# Patient Record
Sex: FEMALE | Race: BLACK OR AFRICAN AMERICAN | NOT HISPANIC OR LATINO | Employment: FULL TIME | URBAN - METROPOLITAN AREA
[De-identification: names, ages, dates, MRNs, and addresses within clinical notes are randomized per-mention and may not be internally consistent; named-entity substitution may affect disease eponyms.]

---

## 2017-08-20 ENCOUNTER — TELEPHONE (OUTPATIENT)
Dept: HEMATOLOGY/ONCOLOGY | Facility: CLINIC | Age: 47
End: 2017-08-20

## 2017-08-20 NOTE — TELEPHONE ENCOUNTER
----- Message from Viviana Caelro sent at 8/15/2017 10:04 AM CDT -----  New Patient Referral. Patient is being referred for anemia by TREY Myers. She has [employer]. Please Review and Schedule. Thanks

## 2017-09-08 ENCOUNTER — TELEPHONE (OUTPATIENT)
Dept: HEMATOLOGY/ONCOLOGY | Facility: CLINIC | Age: 47
End: 2017-09-08

## 2017-09-08 ENCOUNTER — OFFICE VISIT (OUTPATIENT)
Dept: HEMATOLOGY/ONCOLOGY | Facility: CLINIC | Age: 47
End: 2017-09-08
Payer: COMMERCIAL

## 2017-09-08 VITALS
HEIGHT: 64 IN | TEMPERATURE: 98 F | HEART RATE: 74 BPM | DIASTOLIC BLOOD PRESSURE: 82 MMHG | SYSTOLIC BLOOD PRESSURE: 124 MMHG | WEIGHT: 233 LBS | BODY MASS INDEX: 39.78 KG/M2

## 2017-09-08 DIAGNOSIS — I10 ESSENTIAL HYPERTENSION: ICD-10-CM

## 2017-09-08 DIAGNOSIS — I26.99 PULMONARY EMBOLISM, BILATERAL: ICD-10-CM

## 2017-09-08 DIAGNOSIS — K21.9 GASTROESOPHAGEAL REFLUX DISEASE, ESOPHAGITIS PRESENCE NOT SPECIFIED: ICD-10-CM

## 2017-09-08 DIAGNOSIS — E78.00 HIGH BLOOD CHOLESTEROL: ICD-10-CM

## 2017-09-08 DIAGNOSIS — D50.0 IRON DEFICIENCY ANEMIA DUE TO CHRONIC BLOOD LOSS: ICD-10-CM

## 2017-09-08 DIAGNOSIS — J41.0 SIMPLE CHRONIC BRONCHITIS: ICD-10-CM

## 2017-09-08 PROBLEM — J42 CHRONIC BRONCHITIS: Status: ACTIVE | Noted: 2017-09-08

## 2017-09-08 PROCEDURE — 99204 OFFICE O/P NEW MOD 45 MIN: CPT | Mod: ,,, | Performed by: INTERNAL MEDICINE

## 2017-09-08 PROCEDURE — 3008F BODY MASS INDEX DOCD: CPT | Mod: ,,, | Performed by: INTERNAL MEDICINE

## 2017-09-08 RX ORDER — PRAVASTATIN SODIUM 20 MG/1
20 TABLET ORAL DAILY
COMMUNITY
End: 2018-04-09 | Stop reason: SDUPTHER

## 2017-09-08 RX ORDER — NAPROXEN SODIUM 220 MG/1
81 TABLET, FILM COATED ORAL DAILY
COMMUNITY

## 2017-09-08 RX ORDER — OMEPRAZOLE 20 MG/1
20 CAPSULE, DELAYED RELEASE ORAL DAILY
COMMUNITY

## 2017-09-08 RX ORDER — LEVOCETIRIZINE DIHYDROCHLORIDE 5 MG/1
5 TABLET, FILM COATED ORAL NIGHTLY
COMMUNITY

## 2017-09-08 RX ORDER — VITAMIN E 268 MG
400 CAPSULE ORAL DAILY
COMMUNITY

## 2017-09-09 NOTE — PROGRESS NOTES
[facility] History & Physical    Subjective:      Patient ID:   Jalyn Dickson  47 y.o. female  1970  [place]      Chief Complaint:   Anemia and PE evaluation    HPI:  47 y.o. female with diagnosis of anemia x's several years.  On po Fe intermittently.  No hx B 12 pills or shots.  No jaundice, hepatitis, GB dx.  Son with S.S. Trait.    17 had SOB, L pleuritic chest pain.  Seen in ER.  Bilateral PE, treated with lovenox initially, followed by Eliquix 10mg BID followed by 5 mg BID now.  SOB better, but DECKER persists.  Chest pain resolved.  She gave hx of traveling to [place] 2017, prior to PE event.  Has not been on BCP x's 18 years, or HRT.    No DM, PUD, DVT, renal dx, cholesterol, thyroid dx or arthritis.    Hx BTL , thyroglossal cyst removed from neck .  Dr. Daniel Garner, ENT.  [id number]  Heavy menses, monthly, especially since starting Eliquis.    Smoke no.  ETOH rarely.  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    Dad DM, PVD, (vascular bypass),  CAD, S/P CABG, HTN cholesterol, recovered heroin addict.  72y/o.  Mom 60 y/o. AMI.  Son A & W.  Others HTN, DM, PVD, heart dx., SS dx and SLE.    ROS:   GEN: normal without any fever, night sweats or weight loss  HEENT: See HPI.   no HA's, sore throat, stiff neck, changes in vision  CV: normal with no CP, SOB, PND, DECKER or orthopnea  PULM: See HPI.  no SOB, cough, hemoptysis, sputum or pleuritic pain  GI: See HPI.   no abdominal pain, nausea, vomiting, constipation, diarrhea, melanotic stools, BRBPR, or hematemesis  : normal with no hematuria, dysuria  BREAST: normal with no mass, discharge, pain  SKIN: normal with no rash, erythema, bruising, or swelling     Past Medical History:   Diagnosis Date    Acid reflux     Allergy     Anxiety     Hypertension     Neck mass     Pulmonary embolism     Sinus drainage      Past Surgical History:   Procedure Laterality Date    NECK LESION BIOPSY      neck mass removal      TUBAL LIGATION      TUBAL LIGATION   "       Review of patient's allergies indicates:  No Known Allergies  Social History     Social History    Marital status:      Spouse name: N/A    Number of children: N/A    Years of education: N/A     Occupational History    Not on file.     Social History Main Topics    Smoking status: Never Smoker    Smokeless tobacco: Never Used    Alcohol use Yes      Comment: occasional    Drug use:     Sexual activity: Not on file     Other Topics Concern    Not on file     Social History Narrative    No narrative on file         Current Outpatient Prescriptions:     amlodipine-valsartan (EXFORGE)  mg per tablet, Take 1 tablet by mouth once daily., Disp: , Rfl:     apixaban (ELIQUIS) 5 mg Tab, Take 5 mg by mouth 2 (two) times daily., Disp: , Rfl:     aspirin 81 MG Chew, Take 81 mg by mouth once daily., Disp: , Rfl:     levocetirizine (XYZAL) 5 MG tablet, Take 5 mg by mouth every evening., Disp: , Rfl:     lorazepam (ATIVAN) 0.5 MG tablet, Take 0.5 mg by mouth every 6 (six) hours as needed., Disp: , Rfl:     omeprazole (PRILOSEC) 20 MG capsule, Take 20 mg by mouth once daily., Disp: , Rfl:     pravastatin (PRAVACHOL) 20 MG tablet, Take 20 mg by mouth once daily., Disp: , Rfl:     vitamin E 400 UNIT capsule, Take 400 Units by mouth once daily., Disp: , Rfl:           Objective:   Vitals:  Blood pressure 124/82, pulse 74, temperature 98.3 °F (36.8 °C), temperature source Oral, height 5' 4" (1.626 m), weight 105.7 kg (233 lb).    Physical Examination:   GEN: no apparent distress, comfortable  HEAD: atraumatic and normocephalic  EYES: no pallor, no icterus  ENT: no pharyngeal erythema, external ears WNL; no nasal discharge; no thrush  NECK: no masses, thyroid normal, trachea midline, no LAD/LN's, supple  CV: RRR with no murmur; normal pulse  CHEST: Normal respiratory effort; CTAB; normal breath sounds; no wheeze or crackles  ABDOM: nontender and nondistended; soft; normal bowel sounds; no " rebound/guarding, liver/spleen not palpable  MUSC/Skeletal: ROM normal; no crepitus; joints normal  EXTREM: no clubbing, cyanosis, inflammation or swelling  SKIN: no rashes, lesions, ulcers, petechiae  NEURO: grossly intact; motor/sensory WNL; no tremors  PSYCH: normal mood, affect and behavior  LYMPH: normal cervical, supraclavicular, axillary and groin LN's  BREASTS: She left bra on, I did not examine breasts.    Labs:  H/H 9.2/31.6, WBC 7,100.  plt cnt 484,000.  MCV 76, RDW 16.7.       Radiology/Diagnostic Studies:  Colonoscopy 1 year ago, Dr. Rush.      Assessment:   (1) 47 y.o. female with diagnosis of bilateral pulmonary emboli, hypercoagulation evaluation ordered at [facility].       Eliquis 5mg 1 BID. X's 6 months, recheck CAT at 6 months, 1/2018.    (2)Anemia 2nd heavy menses.  Discussed iron in making RBC, po iron with Vitamin C vs Ferrlicet infusion x's 8 weeks to       Replenish Fe stores.  Ferrlicet 1-2% reaction risk to infusion.  Oral Fe can give constipation, add chronulac syrup to help with       BM.    (3) Start FeS04 325mg 1 daily, Vitamin C 500mg 1 daily.  Chronulac 1 tbs daily or BID prn.       Check CBC, ferritin 6 weeks, RTC 6 weeks.          1. Pulmonary embolism, bilateral    2. Iron deficiency anemia due to chronic blood loss    3. Essential hypertension    4. Gastroesophageal reflux disease, esophagitis presence not specified    5. High blood cholesterol    6. Simple chronic bronchitis        Plan:       Pulmonary embolism, bilateral    Iron deficiency anemia due to chronic blood loss    Essential hypertension    Gastroesophageal reflux disease, esophagitis presence not specified    High blood cholesterol    Simple chronic bronchitis

## 2017-09-10 LAB
LA PPP-IMP: NORMAL
SCREEN APTT: NORMAL S
SCREEN DRVVT: NORMAL S

## 2017-09-12 LAB
APCR PPP: NORMAL {RATIO}
APTT PPP: 29 SEC (ref 22–34)
AT III ACT/NOR PPP CHRO: NORMAL %
B2 MICROGLOB SERPL-MCNC: 2 MG/L
CARDIOLIPIN IGA SER IA-ACNC: <11 APL
CARDIOLIPIN IGG SER IA-ACNC: <14 GPL
CARDIOLIPIN IGM SER IA-ACNC: <12 MPL
F2 C.20210G>A GENO BLD/T: NORMAL
F5 GENE MUT ANL BLD/T: NORMAL
FACT VIII ACT/NOR PPP: NORMAL %
FACT XII ACT/NOR PPP: NORMAL %
FERRITIN SERPL-MCNC: 3 NG/ML (ref 10–232)
FIBRINOGEN PPP-MCNC: 493 MG/DL (ref 175–425)
FOLATE SERPL-MCNC: >24 NG/ML
HCYS SERPL-SCNC: 9.9 UMOL/L
INR PPP: 1
LA PPP-IMP: NORMAL
MTHFR GENE MUT ANL BLD/T: NORMAL
PROT C ACT/NOR PPP: NORMAL %
PROT C AG ACT/NOR PPP IA: NORMAL %
PROT S ACT/NOR PPP: NORMAL %
PROT S AG ACT/NOR PPP IA: NORMAL %
PROTHROMBIN TIME: 11.1 SEC (ref 9–11.5)
SCREEN APTT: NORMAL S
SCREEN DRVVT: NORMAL S
SERVICE CMNT-IMP: NORMAL
VIT B12 SERPL-MCNC: 464 PG/ML (ref 200–1100)

## 2017-10-16 ENCOUNTER — OFFICE VISIT (OUTPATIENT)
Dept: HEMATOLOGY/ONCOLOGY | Facility: CLINIC | Age: 47
End: 2017-10-16
Payer: COMMERCIAL

## 2017-10-16 VITALS
TEMPERATURE: 98 F | WEIGHT: 234.63 LBS | HEART RATE: 81 BPM | SYSTOLIC BLOOD PRESSURE: 128 MMHG | DIASTOLIC BLOOD PRESSURE: 82 MMHG | RESPIRATION RATE: 18 BRPM | BODY MASS INDEX: 40.06 KG/M2 | HEIGHT: 64 IN

## 2017-10-16 DIAGNOSIS — D50.0 IRON DEFICIENCY ANEMIA DUE TO CHRONIC BLOOD LOSS: Primary | ICD-10-CM

## 2017-10-16 PROCEDURE — 99214 OFFICE O/P EST MOD 30 MIN: CPT | Mod: ,,, | Performed by: INTERNAL MEDICINE

## 2017-10-16 RX ORDER — AMLODIPINE, VALSARTAN AND HYDROCHLOROTHIAZIDE 10; 160; 12.5 MG/1; MG/1; MG/1
TABLET ORAL
COMMUNITY
Start: 2017-09-29 | End: 2018-04-09 | Stop reason: SDUPTHER

## 2017-10-16 RX ORDER — FERROUS SULFATE 325(65) MG
TABLET ORAL
COMMUNITY
Start: 2017-10-05 | End: 2018-09-26 | Stop reason: SDUPTHER

## 2017-10-16 RX ORDER — LACTULOSE 10 G/15ML
10 SOLUTION ORAL; RECTAL 2 TIMES DAILY
COMMUNITY
Start: 2017-09-11 | End: 2023-10-04 | Stop reason: SDUPTHER

## 2017-10-16 NOTE — PROGRESS NOTES
[facility] History & Physical    Subjective:      Patient ID:   Jalyn Dickson  47 y.o. female  1970  [facility]      Chief Complaint:   Anemia and PE evaluation    HPI:  47 y.o. female with diagnosis of anemia x's several years.  On po Fe intermittently.  No hx B 12 pills or shots.  No jaundice, hepatitis, GB dx.  Son with S.S. Trait.    17 had SOB, L pleuritic chest pain.  Seen in ER.  Bilateral PE, treated with lovenox initially, followed by Eliquix 10mg BID followed by 5 mg BID now.  SOB better, and DECKER is improved. She rates herself as going from a 2 to a 6 on a scale of 1-10.  Chest pain resolved.  She gave hx of traveling to [place] 2017, prior to PE event.  Has not been on BCP x's 18 years, or HRT.    No DM, PUD, DVT, renal dx, cholesterol, thyroid dx or arthritis.    Hx BTL , thyroglossal cyst removed from neck .  Dr. Daniel Garner, ENT.  [id number]  Heavy menses, monthly, especially since starting Eliquis.  Dr. Knutson, her gynecologist, is considering elective uterine ablation. She is status post bilateral tubal ligation    She is taking her iron pill daily, and is taking lactulose when necessary to help with constipation associated with her iron pill.    The pulmonary embolus was 2017. She will continue on her Eliquis  5 mg twice a day through 2017. At that time she and I will make a decision on continuing reduced dosage or stopping the Eliquis at that time.    Baseline hemoglobin is 9 ferritin is 3. She will continue on oral iron at this time and follow-up CBC and [facility]    Hyper coagulation studies are beginning to return, and are essentially normal thus far, await the results of pending labs.    Smoke no.  ETOH rarely.  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    Dad DM, PVD, (vascular bypass),  CAD, S/P CABG, HTN cholesterol, recovered heroin addict.  70y/o.  Mom 60 y/o. AMI.  Son A & W.  Others HTN, DM, PVD, heart dx., SS dx and SLE.    ROS:    GEN: normal without any fever, night sweats or weight loss  HEENT: See HPI.   no HA's, sore throat, stiff neck, changes in vision  CV: normal with no CP, SOB, PND, DECKER or orthopnea  PULM: See HPI.  no SOB, cough, hemoptysis, sputum or pleuritic pain  GI: See HPI.   no abdominal pain, nausea, vomiting, constipation, diarrhea, melanotic stools, BRBPR, or hematemesis  : normal with no hematuria, dysuria  BREAST: normal with no mass, discharge, pain  SKIN: normal with no rash, erythema, bruising, or swelling     Past Medical History:   Diagnosis Date    Acid reflux     Allergy     Anxiety     Hypertension     Neck mass     Pulmonary embolism     Sinus drainage      Past Surgical History:   Procedure Laterality Date    NECK LESION BIOPSY      neck mass removal      TUBAL LIGATION      TUBAL LIGATION         Review of patient's allergies indicates:  No Known Allergies  Social History     Social History    Marital status:      Spouse name: N/A    Number of children: N/A    Years of education: N/A     Occupational History    Not on file.     Social History Main Topics    Smoking status: Never Smoker    Smokeless tobacco: Never Used    Alcohol use Yes      Comment: occasional    Drug use:     Sexual activity: Not on file     Other Topics Concern    Not on file     Social History Narrative    No narrative on file         Current Outpatient Prescriptions:     amlodipine-valsartan (EXFORGE)  mg per tablet, Take 1 tablet by mouth once daily., Disp: , Rfl:     amlodipine-valsartan-hcthiazid -12.5 mg Tab, , Disp: , Rfl:     apixaban (ELIQUIS) 5 mg Tab, Take 5 mg by mouth 2 (two) times daily., Disp: , Rfl:     aspirin 81 MG Chew, Take 81 mg by mouth once daily., Disp: , Rfl:     ferrous sulfate 325 mg (65 mg iron) Tab tablet, , Disp: , Rfl:     GENERLAC 10 gram/15 mL solution, , Disp: , Rfl:     levocetirizine (XYZAL) 5 MG tablet, Take 5 mg by mouth every evening., Disp: , Rfl:  "    lorazepam (ATIVAN) 0.5 MG tablet, Take 0.5 mg by mouth every 6 (six) hours as needed., Disp: , Rfl:     omeprazole (PRILOSEC) 20 MG capsule, Take 20 mg by mouth once daily., Disp: , Rfl:     pravastatin (PRAVACHOL) 20 MG tablet, Take 20 mg by mouth once daily., Disp: , Rfl:     vitamin E 400 UNIT capsule, Take 400 Units by mouth once daily., Disp: , Rfl:           Objective:   Vitals:  Blood pressure 128/82, pulse 81, temperature 97.7 °F (36.5 °C), resp. rate 18, height 5' 4" (1.626 m), weight 106.4 kg (234 lb 9.6 oz).    Physical Examination:   GEN: no apparent distress, comfortable  HEAD: atraumatic and normocephalic  EYES: no pallor, no icterus  ENT: no pharyngeal erythema, external ears WNL; no nasal discharge; no thrush  NECK: no masses, thyroid normal, trachea midline, no LAD/LN's, supple  CV: RRR with no murmur; normal pulse  CHEST: Normal respiratory effort; CTAB; normal breath sounds; no wheeze or crackles  ABDOM: nontender and nondistended; soft; normal bowel sounds; no rebound/guarding, liver/spleen not palpable  MUSC/Skeletal: ROM normal; no crepitus; joints normal  EXTREM: no clubbing, cyanosis, inflammation or swelling  SKIN: no rashes, lesions, ulcers, petechiae  NEURO: grossly intact; motor/sensory WNL; no tremors  PSYCH: normal mood, affect and behavior  LYMPH: normal cervical, supraclavicular, axillary and groin LN's  BREASTS: She left bra on, I did not examine breasts.    Labs:  H/H 9.2/31.6, WBC 7,100.  plt cnt 484,000.  MCV 76, RDW 16.7.   Ferritin was 3.    Radiology/Diagnostic Studies:  Colonoscopy 1 year ago, Dr. Rsuh.      Assessment:   (1) 47 y.o. female with diagnosis of bilateral pulmonary emboli, hypercoagulation evaluation ordered at [facility].       Eliquis 5mg 1 BID. X's 6 months, recheck CAT at 6 months, 1/2018.  Results of hypercoagulation evaluation are pending.    (2)Anemia 2nd heavy menses.  Discussed iron in making RBC, po iron with Vitamin C vs Ferrlicet infusion x's 8 " weeks to       Replenish Fe stores.  Ferrlicet 1-2% reaction risk to infusion.  Oral Fe can give constipation, add chronulac syrup to help with       BM.    (3) Start FeS04 325mg 1 daily, Vitamin C 500mg 1 daily.  Chronulac 1 tbs daily or BID prn.       Check CBC, ferritin 2  Months, return to clinic 2 months.

## 2017-10-16 NOTE — LETTER
October 16, 2017      Rachel Wagner MD  [address]  [address]  [place] [address]           [facility] Hematology Oncology  [address]  [address]  [place] [address]  Phone: [phone]  Fax: [phone]          Patient: Jalyn Dickson   MR Number: [id number]   YOB: 1970   Date of Visit: 10/16/2017       Dear Dr. Rachel Wagner:    Thank you for referring Jalyn Dickson to me for evaluation. Attached you will find relevant portions of my assessment and plan of care.    If you have questions, please do not hesitate to call me. I look forward to following Jalyn Dickson along with you.    Sincerely,    DELORIS Sims MD    Enclosure  CC:  No Recipients    If you would like to receive this communication electronically, please contact [email or web address] or [phone] to request more information on [employer] Link access.    For providers and/or their staff who would like to refer a patient to [facility], please contact us through our one-stop-shop provider referral line, [facility], at [phone].    If you feel you have received this communication in error or would no longer like to receive these types of communications, please e-mail [email or web address]

## 2017-12-18 ENCOUNTER — OFFICE VISIT (OUTPATIENT)
Dept: HEMATOLOGY/ONCOLOGY | Facility: CLINIC | Age: 47
End: 2017-12-18
Payer: COMMERCIAL

## 2017-12-18 VITALS
DIASTOLIC BLOOD PRESSURE: 73 MMHG | SYSTOLIC BLOOD PRESSURE: 116 MMHG | BODY MASS INDEX: 39.9 KG/M2 | HEIGHT: 64 IN | RESPIRATION RATE: 18 BRPM | WEIGHT: 233.69 LBS | HEART RATE: 82 BPM | TEMPERATURE: 98 F

## 2017-12-18 DIAGNOSIS — I26.99 PULMONARY EMBOLISM, BILATERAL: Primary | ICD-10-CM

## 2017-12-18 DIAGNOSIS — D50.0 IRON DEFICIENCY ANEMIA DUE TO CHRONIC BLOOD LOSS: ICD-10-CM

## 2017-12-18 LAB
BASOPHILS # BLD AUTO: 40 CELLS/UL (ref 0–200)
BASOPHILS NFR BLD AUTO: 0.6 %
EOSINOPHIL # BLD AUTO: 221 CELLS/UL (ref 15–500)
EOSINOPHIL NFR BLD AUTO: 3.3 %
ERYTHROCYTE [DISTWIDTH] IN BLOOD BY AUTOMATED COUNT: 16 % (ref 11–15)
FERRITIN SERPL-MCNC: 13 NG/ML (ref 10–232)
HCT VFR BLD AUTO: 39.2 % (ref 35–45)
HGB BLD-MCNC: 12.3 G/DL (ref 11.7–15.5)
LYMPHOCYTES # BLD AUTO: 2647 CELLS/UL (ref 850–3900)
LYMPHOCYTES NFR BLD AUTO: 39.5 %
MCH RBC QN AUTO: 25.8 PG (ref 27–33)
MCHC RBC AUTO-ENTMCNC: 31.4 G/DL (ref 32–36)
MCV RBC AUTO: 82.2 FL (ref 80–100)
MONOCYTES # BLD AUTO: 690 CELLS/UL (ref 200–950)
MONOCYTES NFR BLD AUTO: 10.3 %
NEUTROPHILS # BLD AUTO: 3102 CELLS/UL (ref 1500–7800)
NEUTROPHILS NFR BLD AUTO: 46.3 %
PLATELET # BLD AUTO: 464 THOUSAND/UL (ref 140–400)
PMV BLD REES-ECKER: 10.8 FL (ref 7.5–12.5)
RBC # BLD AUTO: 4.77 MILLION/UL (ref 3.8–5.1)
WBC # BLD AUTO: 6.7 THOUSAND/UL (ref 3.8–10.8)

## 2017-12-18 PROCEDURE — 99214 OFFICE O/P EST MOD 30 MIN: CPT | Mod: ,,, | Performed by: INTERNAL MEDICINE

## 2017-12-18 NOTE — PROGRESS NOTES
[facility] History & Physical    Subjective:      Patient ID:   Jalyn Dickson  47 y.o. female  1970  [facility]      Chief Complaint:   Anemia and PE evaluation    HPI:  47 y.o. female with diagnosis of anemia x's several years.  On po Fe intermittently.  No hx B 12 pills or shots.  No jaundice, hepatitis, GB dx.  Son with S.S. Trait.    17 had SOB, L pleuritic chest pain.  Seen in ER.  Bilateral PE, treated with lovenox initially, followed by Eliquix 10mg BID followed by 5 mg BID now.  SOB better, and DECKER is improved. She rates herself as going from a 2 to a 6 on a scale of 1-10.  Chest pain resolved.  She gave hx of traveling to [place] 2017, prior to PE event.  Has not been on BCP x's 18 years, or HRT.    No DM, PUD, DVT, renal dx, cholesterol, thyroid dx or arthritis.    Hx BTL , thyroglossal cyst removed from neck .  Dr. Daniel Garner, ENT.  [id number]  Heavy menses, monthly, especially since starting Eliquis.  Dr. Knutson, her gynecologist, is considering elective uterine ablation. She is status post bilateral tubal ligation    She is taking her iron pill daily, and is taking lactulose when necessary to help with constipation associated with her iron pill.    The pulmonary embolus was 2017. She will continue on her Eliquis  5 mg twice a day through 2017. At that time she and I will make a decision on continuing reduced dosage or stopping the Eliquis at that time.    Baseline hemoglobin is 9 ferritin is 3. She will continue on oral iron at this time and follow-up CBC and [facility].    After oral irom, hemoglobin is improved to 12.3 and ferritin is improved to 13. She will continue on oral iron at this time and repeat blood work will be checked in 4 months.    Hyper coagulation studies are beginning to return, and are essentially normal thus far, await the results of pending labs.    Smoke no.  ETOH rarely.  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    Dad DM,  PVD, (vascular bypass),  CAD, S/P CABG, HTN cholesterol, recovered heroin addict.  72y/o.  Mom 58 y/o. AMI.  Son A & W.  Others HTN, DM, PVD, heart dx., SS dx and SLE.    ROS:   GEN: normal without any fever, night sweats or weight loss  HEENT: See HPI.   no HA's, sore throat, stiff neck, changes in vision  CV: normal with no CP, SOB, PND, DECKER or orthopnea  PULM: See HPI.  no SOB, cough, hemoptysis, sputum or pleuritic pain  GI: See HPI.   no abdominal pain, nausea, vomiting, constipation, diarrhea, melanotic stools, BRBPR, or hematemesis  : normal with no hematuria, dysuria  BREAST: normal with no mass, discharge, pain  SKIN: normal with no rash, erythema, bruising, or swelling     Past Medical History:   Diagnosis Date    Acid reflux     Allergy     Anxiety     Hypertension     Neck mass     Pulmonary embolism     Sinus drainage      Past Surgical History:   Procedure Laterality Date    NECK LESION BIOPSY      neck mass removal      TUBAL LIGATION      TUBAL LIGATION         Review of patient's allergies indicates:  No Known Allergies  Social History     Social History    Marital status:      Spouse name: N/A    Number of children: N/A    Years of education: N/A     Occupational History    Not on file.     Social History Main Topics    Smoking status: Never Smoker    Smokeless tobacco: Never Used    Alcohol use Yes      Comment: occasional    Drug use: Yes    Sexual activity: Not on file     Other Topics Concern    Not on file     Social History Narrative    No narrative on file         Current Outpatient Prescriptions:     amlodipine-valsartan (EXFORGE)  mg per tablet, Take 1 tablet by mouth once daily., Disp: , Rfl:     amlodipine-valsartan-hcthiazid -12.5 mg Tab, , Disp: , Rfl:     apixaban (ELIQUIS) 5 mg Tab, Take 5 mg by mouth 2 (two) times daily., Disp: , Rfl:     aspirin 81 MG Chew, Take 81 mg by mouth once daily., Disp: , Rfl:     ferrous sulfate 325 mg (65  "mg iron) Tab tablet, , Disp: , Rfl:     GENERLAC 10 gram/15 mL solution, , Disp: , Rfl:     levocetirizine (XYZAL) 5 MG tablet, Take 5 mg by mouth every evening., Disp: , Rfl:     lorazepam (ATIVAN) 0.5 MG tablet, Take 0.5 mg by mouth every 6 (six) hours as needed., Disp: , Rfl:     omeprazole (PRILOSEC) 20 MG capsule, Take 20 mg by mouth once daily., Disp: , Rfl:     pravastatin (PRAVACHOL) 20 MG tablet, Take 20 mg by mouth once daily., Disp: , Rfl:     vitamin E 400 UNIT capsule, Take 400 Units by mouth once daily., Disp: , Rfl:           Objective:   Vitals:  Blood pressure 116/73, pulse 82, temperature 98.1 °F (36.7 °C), resp. rate 18, height 5' 4" (1.626 m), weight 106 kg (233 lb 11.2 oz).    Physical Examination:   GEN: no apparent distress, comfortable  HEAD: atraumatic and normocephalic  EYES: no pallor, no icterus  ENT: no pharyngeal erythema, external ears WNL; no nasal discharge; no thrush  NECK: no masses, thyroid normal, trachea midline, no LAD/LN's, supple  CV: RRR with no murmur; normal pulse  CHEST: Normal respiratory effort; CTAB; normal breath sounds; no wheeze or crackles  ABDOM: nontender and nondistended; soft; normal bowel sounds; no rebound/guarding, liver/spleen not palpable  MUSC/Skeletal: ROM normal; no crepitus; joints normal  EXTREM: no clubbing, cyanosis, inflammation or swelling  SKIN: no rashes, lesions, ulcers, petechiae  NEURO: grossly intact; motor/sensory WNL; no tremors  PSYCH: normal mood, affect and behavior  LYMPH: normal cervical, supraclavicular, axillary and groin LN's  BREASTS: She left bra on, I did not examine breasts.    Labs:  H/H 9.2/31.6.   RDW 16.7.   Ferritin was 3.  On repeat hemoglobin is 12.3 and ferritin is improved at 13    Radiology/Diagnostic Studies:  Colonoscopy 1 year ago, Dr. Rush.      Assessment:   (1) 47 y.o. female with diagnosis of bilateral pulmonary emboli, hypercoagulation evaluation ordered at [facility].       Eliquis 5mg 1 BID. X's 6 " months, recheck CAT at 6 months, 1/2018.  Results of hypercoagulation evaluation are pending.    (2)Anemia 2nd heavy menses.  Discussed iron in making RBC, po iron with Vitamin C vs Ferrlicet infusion x's 8 weeks to       Replenish Fe stores.  Ferrlicet 1-2% reaction risk to infusion.  Oral Fe can give constipation, add chronulac syrup to help with       BM.    (3) Continue FeS04 325mg 1 daily, Vitamin C 500mg 1 daily.  Chronulac 1 tbs daily or BID prn.       Check CBC, ferritin 4  Months, return to clinic 4 months.    Check CAT scan status in 1 week and return to clinic in 2 weeks, can cancel.  Make a decision on continuing Elliquis or  stopping Elliquis after hypercoagulation labs studies have returned.

## 2017-12-18 NOTE — LETTER
December 18, 2017      Rachel Wagner MD  [address]  [address]  [place] [address]           [facility]  [address]  [address]  [place] [address]  Phone: [phone]  Fax: [phone]          Patient: Jalyn Dickson   MR Number: [id number]   YOB: 1970   Date of Visit: 12/18/2017       Dear Dr. Rachel Wagner:    Thank you for referring Jalyn Dickson to me for evaluation. Attached you will find relevant portions of my assessment and plan of care.    If you have questions, please do not hesitate to call me. I look forward to following Jalyn Dickson along with you.    Sincerely,    DELORIS Sims MD    Enclosure  CC:  No Recipients    If you would like to receive this communication electronically, please contact [email or web address] or [phone] to request more information on [employer] Link access.    For providers and/or their staff who would like to refer a patient to [facility], please contact us through our one-stop-shop provider referral line, [facility], at [phone].    If you feel you have received this communication in error or would no longer like to receive these types of communications, please e-mail [email or web address]

## 2017-12-20 ENCOUNTER — TELEPHONE (OUTPATIENT)
Dept: HEMATOLOGY/ONCOLOGY | Facility: CLINIC | Age: 47
End: 2017-12-20

## 2017-12-20 DIAGNOSIS — I26.99 PULMONARY EMBOLISM, BILATERAL: Primary | ICD-10-CM

## 2017-12-20 NOTE — TELEPHONE ENCOUNTER
Called the pt and LM letting her know that we are working on her orders to be signed and authorized.

## 2017-12-28 ENCOUNTER — TELEPHONE (OUTPATIENT)
Dept: HEMATOLOGY/ONCOLOGY | Facility: CLINIC | Age: 47
End: 2017-12-28

## 2018-01-03 NOTE — TELEPHONE ENCOUNTER
Notified pt that CT scan showed that PE had resolved and she needed to stay on eliquis BID and keep appt. To see him in 4 months. EVIE

## 2018-03-30 LAB
BASOPHILS ABSOLUTE COUNT: 0.1 K/UL (ref 0–0.2)
BASOPHILS NFR BLD: 1.1 % (ref 0–2)
DIFFERENTIAL TYPE: ABNORMAL
EOSINOPHIL NFR BLD: 2.7 % (ref 0–4)
EOSINOPHILS ABSOLUTE COUNT: 0.2 K/UL (ref 0–0.7)
ERYTHROCYTE [DISTWIDTH] IN BLOOD BY AUTOMATED COUNT: 15.3 GRAM/DL (ref 12–15.3)
FERRITIN SERPL-MCNC: 24.2 NANOGRAM/ML (ref 13–150)
HCT VFR BLD AUTO: 38.7 % (ref 37–47)
HGB BLD-MCNC: 12.8 GRAM/DL (ref 12–16)
LYMPHOCYTES %: 34.9 % (ref 15–45)
LYMPHOCYTES ABSOLUTE COUNT: 2 K/UL (ref 1–4.2)
MCH RBC QN AUTO: 29.9 PICOGRAM (ref 27–33)
MCHC RBC AUTO-ENTMCNC: 33.2 GRAM/DL (ref 32–36)
MCV RBC AUTO: 90 FEMTOLITER (ref 81–99)
MONOCYTES %: 10.6 % (ref 3–13)
MONOCYTES ABSOLUTE COUNT: 0.6 K/UL (ref 0.1–0.8)
NEUTROPHILS ABSOLUTE COUNT: 2.8 K/UL (ref 2.1–7.6)
NEUTROPHILS RELATIVE PERCENT: 50.7 % (ref 32–80)
PLATELET # BLD AUTO: 425 K/UL (ref 150–350)
PMV BLD AUTO: 8.9 FEMTOLITER (ref 7–10.2)
RBC # BLD AUTO: 4.3 MIL/UL (ref 4.2–5.4)
WBC # BLD AUTO: 5.6 K/UL (ref 4.5–11)

## 2018-04-02 ENCOUNTER — TELEPHONE (OUTPATIENT)
Dept: INTERNAL MEDICINE | Facility: CLINIC | Age: 48
End: 2018-04-02

## 2018-04-02 DIAGNOSIS — E78.00 PURE HYPERCHOLESTEROLEMIA: Primary | ICD-10-CM

## 2018-04-02 NOTE — TELEPHONE ENCOUNTER
----- Message from Ariana Goncalves sent at 4/2/2018  8:53 AM CDT -----  Contact: Jalyn Fleming is requesting lab orders to check her cholesterol prior to her appt on 4/9

## 2018-04-09 ENCOUNTER — OFFICE VISIT (OUTPATIENT)
Dept: INTERNAL MEDICINE | Facility: CLINIC | Age: 48
End: 2018-04-09
Payer: COMMERCIAL

## 2018-04-09 VITALS
HEIGHT: 64 IN | HEART RATE: 74 BPM | DIASTOLIC BLOOD PRESSURE: 82 MMHG | SYSTOLIC BLOOD PRESSURE: 122 MMHG | WEIGHT: 220 LBS | OXYGEN SATURATION: 98 % | RESPIRATION RATE: 18 BRPM | BODY MASS INDEX: 37.56 KG/M2 | TEMPERATURE: 98 F

## 2018-04-09 DIAGNOSIS — E78.00 HIGH BLOOD CHOLESTEROL: ICD-10-CM

## 2018-04-09 DIAGNOSIS — K21.9 GASTROESOPHAGEAL REFLUX DISEASE WITHOUT ESOPHAGITIS: ICD-10-CM

## 2018-04-09 DIAGNOSIS — I10 ESSENTIAL HYPERTENSION: Primary | ICD-10-CM

## 2018-04-09 DIAGNOSIS — D50.0 IRON DEFICIENCY ANEMIA DUE TO CHRONIC BLOOD LOSS: ICD-10-CM

## 2018-04-09 PROBLEM — F41.9 ANXIETY: Status: ACTIVE | Noted: 2018-04-09

## 2018-04-09 PROCEDURE — 3074F SYST BP LT 130 MM HG: CPT | Mod: ,,, | Performed by: NURSE PRACTITIONER

## 2018-04-09 PROCEDURE — 3079F DIAST BP 80-89 MM HG: CPT | Mod: ,,, | Performed by: NURSE PRACTITIONER

## 2018-04-09 PROCEDURE — 99213 OFFICE O/P EST LOW 20 MIN: CPT | Mod: ,,, | Performed by: NURSE PRACTITIONER

## 2018-04-09 RX ORDER — LORAZEPAM 0.5 MG/1
0.5 TABLET ORAL EVERY 6 HOURS PRN
Status: CANCELLED | OUTPATIENT
Start: 2018-04-09

## 2018-04-09 RX ORDER — IBUPROFEN 100 MG/5ML
1000 SUSPENSION, ORAL (FINAL DOSE FORM) ORAL DAILY
COMMUNITY

## 2018-04-09 RX ORDER — MULTIVIT WITH MINERALS/HERBS
1 TABLET ORAL DAILY
COMMUNITY

## 2018-04-09 RX ORDER — LANOLIN ALCOHOL/MO/W.PET/CERES
100 CREAM (GRAM) TOPICAL DAILY
COMMUNITY

## 2018-04-09 RX ORDER — AMLODIPINE, VALSARTAN AND HYDROCHLOROTHIAZIDE 10; 160; 12.5 MG/1; MG/1; MG/1
1 TABLET ORAL DAILY
Qty: 90 TABLET | Refills: 1 | Status: SHIPPED | OUTPATIENT
Start: 2018-04-09 | End: 2018-10-10 | Stop reason: SDUPTHER

## 2018-04-09 RX ORDER — PRAVASTATIN SODIUM 20 MG/1
20 TABLET ORAL DAILY
Qty: 90 TABLET | Refills: 1 | Status: SHIPPED | OUTPATIENT
Start: 2018-04-09 | End: 2018-10-10

## 2018-04-09 NOTE — PATIENT INSTRUCTIONS
Understanding Anxiety Disorders  Almost everyone gets nervous now and then. Its normal to have knots in your stomach before a test, or for your heart to race on a first date. But an anxiety disorder is much more than a case of nerves. In fact, its symptoms may be overwhelming. But treatment can relieve many of these symptoms. Talking to your healthcare provider is the first step.    What are anxiety disorders?  An anxiety disorder causes intense feelings of panic and fear. These feelings may arise for no apparent reason. And they tend to recur again and again. They may prevent you from coping with life and cause you great distress. As a result, you may avoid anything that triggers your fear. In extreme cases, you may never leave the house. Anxiety disorders may cause other symptoms, such as:  · Obsessive thoughts you cant control  · Constant nightmares or painful thoughts of the past  · Nausea, sweating, and muscle tension  · Trouble sleeping or concentrating  What causes anxiety disorders?  Anxiety disorders tend to run in families. For some people, childhood abuse or neglect may play a role. For others, stressful life events or trauma may trigger anxiety disorders. Anxiety can trigger low self-esteem and poor coping skills.  Common anxiety disorders  · Panic disorder. This causes an intense fear of being in danger.  · Phobias. These are extreme fears of certain objects, places, or events.  · Obsessive-compulsive disorder. This causes you to have unwanted thoughts and urges. You also may perform certain actions over and over.  · Posttraumatic stress disorder. This occurs in people who have survived a terrible ordeal. It can cause nightmares and flashbacks about the event.  · Generalized anxiety disorder. This causes constant worry that can greatly disrupt your life.   Getting better  You may believe that nothing can help you. Or, you might fear what others may think. But most anxiety symptoms can be eased.  Having an anxiety disorder is nothing to be ashamed of. Most people do best with treatment that combines medicine and therapy. These arent cures. But they can help you live a healthier life.  Date Last Reviewed: 2/1/2017  © 9743-4554 "[employer]. [address] [place] [address], [place] [address]. All rights reserved. This information is not intended as a substitute for professional medical care. Always follow your healthcare professional's instructions.

## 2018-04-09 NOTE — PROGRESS NOTES
SUBJECTIVE:      Patient ID: Jalyn Dickson is a 48 y.o. female.    Chief Complaint: Follow-up (6 month)    In for routine follow-up &  Med refills, no complaints at this time    Follows with Dr. Sims for anemia    Wants to get off or lower dose of pravastatin if cholesterol numbers are good - need lipid panel to assess    Requesting refill on ativan -  shows latest script was filled 3/27/18, discussed inability to refill at this time        Past Surgical History:   Procedure Laterality Date    NECK LESION BIOPSY      neck mass removal      TUBAL LIGATION      TUBAL LIGATION       Family History   Problem Relation Age of Onset    Heart disease Mother     Coronary artery disease Father     Peripheral vascular disease Father     Diabetes Father     Hypertension Father       Social History     Social History    Marital status:      Spouse name: N/A    Number of children: N/A    Years of education: N/A     Social History Main Topics    Smoking status: Never Smoker    Smokeless tobacco: Never Used    Alcohol use Yes      Comment: occasional    Drug use: Yes    Sexual activity: Not Asked     Other Topics Concern    None     Social History Narrative    None     Current Outpatient Prescriptions   Medication Sig Dispense Refill    amLODIPine-valsartan-hcthiazid -12.5 mg Tab Take 1 tablet by mouth once daily. 90 tablet 1    apixaban (ELIQUIS) 5 mg Tab Take 5 mg by mouth 2 (two) times daily.      ascorbic acid, vitamin C, (VITAMIN C) 1000 MG tablet Take 1,000 mg by mouth once daily.      aspirin 81 MG Chew Take 81 mg by mouth once daily.      b complex vitamins tablet Take 1 tablet by mouth once daily.      cyanocobalamin (VITAMIN B-12) 1000 MCG tablet Take 100 mcg by mouth once daily.      ferrous sulfate 325 mg (65 mg iron) Tab tablet       GENERLAC 10 gram/15 mL solution       levocetirizine (XYZAL) 5 MG tablet Take 5 mg by mouth every evening.      lorazepam (ATIVAN)  0.5 MG tablet Take 0.5 mg by mouth every 6 (six) hours as needed.      omeprazole (PRILOSEC) 20 MG capsule Take 20 mg by mouth once daily.      pravastatin (PRAVACHOL) 20 MG tablet Take 1 tablet (20 mg total) by mouth once daily. 90 tablet 1    vitamin E 400 UNIT capsule Take 400 Units by mouth once daily.       No current facility-administered medications for this visit.      Review of patient's allergies indicates:  No Known Allergies   Past Medical History:   Diagnosis Date    Acid reflux     Allergy     Anxiety     Hypertension     Neck mass     Pulmonary embolism     Sinus drainage      Past Surgical History:   Procedure Laterality Date    NECK LESION BIOPSY      neck mass removal      TUBAL LIGATION      TUBAL LIGATION         Review of Systems   Constitutional: Negative for activity change, appetite change, fatigue and unexpected weight change.   HENT: Negative for congestion, ear pain, hearing loss, postnasal drip, sinus pain, sinus pressure, sneezing and sore throat.    Eyes: Negative for photophobia and pain.   Respiratory: Negative for cough, chest tightness, shortness of breath and wheezing.    Cardiovascular: Negative for chest pain, palpitations and leg swelling.   Gastrointestinal: Negative for abdominal distention, abdominal pain, blood in stool, constipation, diarrhea, nausea and vomiting.   Endocrine: Negative for cold intolerance, heat intolerance, polydipsia and polyuria.   Genitourinary: Negative for difficulty urinating, dysuria, flank pain, frequency, hematuria, pelvic pain and urgency.   Musculoskeletal: Negative for arthralgias, back pain, joint swelling, myalgias and neck pain.   Skin: Negative for pallor.   Allergic/Immunologic: Negative for environmental allergies and food allergies.   Neurological: Negative for dizziness, weakness, light-headedness and numbness.   Hematological: Bruises/bleeds easily (eliquis).   Psychiatric/Behavioral: Negative for agitation, confusion,  "decreased concentration and sleep disturbance. The patient is not nervous/anxious.       OBJECTIVE:      Vitals:    04/09/18 1623   BP: 122/82   Pulse: 74   Resp: 18   Temp: 97.7 °F (36.5 °C)   SpO2: 98%   Weight: 99.8 kg (220 lb)   Height: 5' 4" (1.626 m)     Physical Exam   Constitutional: She is oriented to person, place, and time. Vital signs are normal. She appears well-developed and well-nourished. She is cooperative. No distress.   HENT:   Head: Normocephalic and atraumatic.   Nose: Nose normal. No rhinorrhea.   Mouth/Throat: Mucous membranes are normal.   Eyes: Conjunctivae and lids are normal. Right pupil is round and reactive. Left pupil is round and reactive.   Neck: Trachea normal.   Cardiovascular: Normal rate, regular rhythm, normal heart sounds and intact distal pulses.  Exam reveals no gallop and no friction rub.    No murmur heard.  Pulses:       Radial pulses are 2+ on the right side, and 2+ on the left side.   Pulmonary/Chest: Effort normal and breath sounds normal. No respiratory distress. She has no decreased breath sounds. She has no wheezes. She has no rhonchi. She has no rales.   Abdominal: Soft. Bowel sounds are normal. She exhibits no distension. There is no tenderness. There is no rigidity and no guarding.   obese   Musculoskeletal: Normal range of motion. She exhibits no edema.   Neurological: She is alert and oriented to person, place, and time. GCS eye subscore is 4. GCS verbal subscore is 5. GCS motor subscore is 6.   Skin: Skin is warm and dry. Capillary refill takes less than 2 seconds. No rash noted.   Psychiatric: She has a normal mood and affect. Her speech is normal and behavior is normal. Judgment and thought content normal. Cognition and memory are normal.   Vitals reviewed.     Assessment:       1. Essential hypertension    2. High blood cholesterol    3. Iron deficiency anemia due to chronic blood loss    4. Gastroesophageal reflux disease without esophagitis        Plan:     "   Essential hypertension  -     amLODIPine-valsartan-hcthiazid -12.5 mg Tab; Take 1 tablet by mouth once daily.  Dispense: 90 tablet; Refill: 1  -     Comprehensive metabolic panel; Future; Expected date: 04/09/2018    High blood cholesterol  -     pravastatin (PRAVACHOL) 20 MG tablet; Take 1 tablet (20 mg total) by mouth once daily.  Dispense: 90 tablet; Refill: 1  -     Lipid panel; Future; Expected date: 04/09/2018    Iron deficiency anemia due to chronic blood loss   -follows with Dr. Sims    Gastroesophageal reflux disease without esophagitis   -stable on meds    Will FU re: labs    Follow-up in about 6 months (around 10/9/2018) for routine.      4/9/2018 ALIRIO Randall, FNP-C

## 2018-04-24 ENCOUNTER — OFFICE VISIT (OUTPATIENT)
Dept: HEMATOLOGY/ONCOLOGY | Facility: CLINIC | Age: 48
End: 2018-04-24
Payer: COMMERCIAL

## 2018-04-24 VITALS
DIASTOLIC BLOOD PRESSURE: 87 MMHG | BODY MASS INDEX: 38.26 KG/M2 | TEMPERATURE: 98 F | HEART RATE: 67 BPM | HEIGHT: 64 IN | SYSTOLIC BLOOD PRESSURE: 121 MMHG | WEIGHT: 224.13 LBS

## 2018-04-24 DIAGNOSIS — D50.0 IRON DEFICIENCY ANEMIA DUE TO CHRONIC BLOOD LOSS: Primary | ICD-10-CM

## 2018-04-24 PROCEDURE — 99214 OFFICE O/P EST MOD 30 MIN: CPT | Mod: ,,, | Performed by: INTERNAL MEDICINE

## 2018-04-24 NOTE — PROGRESS NOTES
[facility] History & Physical    Subjective:      Patient ID:   Jalyn Dickson  48 y.o. female  1970  [facility]      Chief Complaint:   Anemia and PE evaluation    HPI:  48 y.o. female with diagnosis of anemia x's several years.  On po Fe intermittently.  No hx B 12 pills or shots.  No jaundice, hepatitis, GB dx.  Son with S.S. Trait.    17 had SOB, L pleuritic chest pain.  Seen in ER.  Bilateral PE, treated with lovenox initially, followed by Eliquix 10mg BID followed by 5 mg BID now.  SOB better, and DECKER is improved. She rates herself as going from a 2 to a 6 on a scale of 1-10.  Chest pain resolved.  She gave hx of traveling to [place] 2017, prior to PE event.  Has not been on BCP x's 18 years, or HRT.  Repeat CAT 2018 no PE seen.    No DM, PUD, DVT, renal dx, cholesterol, thyroid dx or arthritis.    Hx BTL , thyroglossal cyst removed from neck .  Dr. Daniel Garner, ENT.  [id number]  Heavy menses, monthly, especially since starting Eliquis.  Dr. Knutson, her gynecologist, is considering elective uterine ablation. She is status post bilateral tubal ligation    She is taking her iron pill daily, and is taking lactulose when necessary to help with constipation associated with her iron pill.    The pulmonary embolus was 2017. She will continue on her Eliquis  5 mg twice a day through 2017. At that time she and I will make a decision on continuing reduced dosage or stopping the Eliquis at that time.    Baseline hemoglobin is 9 ferritin is 3. She will continue on oral iron at this time and follow-up CBC and ferritin [facility].    After oral irom, hemoglobin is improved to 12.3 and ferritin is improved to 13. She will continue on oral iron at this time and repeat blood work will be checked in 4 months.    Hyper coagulation studies are beginning to return, and are essentially normal thus far, except MTHFR +---, mutation.  But homocystene is NL at 9, she takes a  multivitamen daily.    On oral Fe, Hgb better at 12.8 from 9.2.  Platelet count trending down at 425,000 with anemia correction ,and Fe replenishment.    Smoke no.  ETOH rarely.  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    Dad DM, PVD, (vascular bypass),  CAD, S/P CABG, HTN cholesterol, recovered heroin addict.  70y/o.  Mom 60 y/o. AMI.  Son A & W.  Others HTN, DM, PVD, heart dx., SS dx and SLE.    ROS:   GEN: normal without any fever, night sweats or weight loss  HEENT: See HPI.   no HA's, sore throat, stiff neck, changes in vision  CV: normal with no CP, SOB, PND, DECKER or orthopnea  PULM: See HPI.  no SOB, cough, hemoptysis, sputum or pleuritic pain  GI: See HPI.   no abdominal pain, nausea, vomiting, constipation, diarrhea, melanotic stools, BRBPR, or hematemesis  : normal with no hematuria, dysuria  BREAST: normal with no mass, discharge, pain  SKIN: normal with no rash, erythema, bruising, or swelling     Past Medical History:   Diagnosis Date    Acid reflux     Allergy     Anxiety     Hypertension     Neck mass     Pulmonary embolism     Sinus drainage      Past Surgical History:   Procedure Laterality Date    NECK LESION BIOPSY      neck mass removal      TUBAL LIGATION      TUBAL LIGATION         Review of patient's allergies indicates:  No Known Allergies  Social History     Social History    Marital status:      Spouse name: N/A    Number of children: N/A    Years of education: N/A     Occupational History    Not on file.     Social History Main Topics    Smoking status: Never Smoker    Smokeless tobacco: Never Used    Alcohol use Yes      Comment: occasional    Drug use: Yes    Sexual activity: Not on file     Other Topics Concern    Not on file     Social History Narrative    No narrative on file         Current Outpatient Prescriptions:     amLODIPine-valsartan-hcthiazid -12.5 mg Tab, Take 1 tablet by mouth once daily., Disp: 90 tablet, Rfl: 1    apixaban (ELIQUIS) 5 mg  "Tab, Take 5 mg by mouth 2 (two) times daily., Disp: , Rfl:     ascorbic acid, vitamin C, (VITAMIN C) 1000 MG tablet, Take 1,000 mg by mouth once daily., Disp: , Rfl:     aspirin 81 MG Chew, Take 81 mg by mouth once daily., Disp: , Rfl:     b complex vitamins tablet, Take 1 tablet by mouth once daily., Disp: , Rfl:     cyanocobalamin (VITAMIN B-12) 1000 MCG tablet, Take 100 mcg by mouth once daily., Disp: , Rfl:     ferrous sulfate 325 mg (65 mg iron) Tab tablet, , Disp: , Rfl:     GENERLAC 10 gram/15 mL solution, , Disp: , Rfl:     levocetirizine (XYZAL) 5 MG tablet, Take 5 mg by mouth every evening., Disp: , Rfl:     lorazepam (ATIVAN) 0.5 MG tablet, Take 0.5 mg by mouth every 6 (six) hours as needed., Disp: , Rfl:     omeprazole (PRILOSEC) 20 MG capsule, Take 20 mg by mouth once daily., Disp: , Rfl:     pravastatin (PRAVACHOL) 20 MG tablet, Take 1 tablet (20 mg total) by mouth once daily., Disp: 90 tablet, Rfl: 1    vitamin E 400 UNIT capsule, Take 400 Units by mouth once daily., Disp: , Rfl:           Objective:   Vitals:  Blood pressure 121/87, pulse 67, temperature 97.9 °F (36.6 °C), height 5' 4" (1.626 m), weight 101.7 kg (224 lb 1.6 oz), last menstrual period 03/26/2018.    Physical Examination:   GEN: no apparent distress, comfortable  HEAD: atraumatic and normocephalic  EYES: no pallor, no icterus  ENT: no pharyngeal erythema, external ears WNL; no nasal discharge; no thrush  NECK: no masses, thyroid normal, trachea midline, no LAD/LN's, supple  CV: RRR with no murmur; normal pulse  CHEST: Normal respiratory effort; CTAB; normal breath sounds; no wheeze or crackles  ABDOM: nontender and nondistended; soft; normal bowel sounds; no rebound/guarding, liver/spleen not palpable  MUSC/Skeletal: ROM normal; no crepitus; joints normal  EXTREM: no clubbing, cyanosis, inflammation or swelling  SKIN: no rashes, lesions, ulcers, petechiae  NEURO: grossly intact; motor/sensory WNL; no tremors  PSYCH: normal " mood, affect and behavior  LYMPH: normal cervical, supraclavicular, axillary and groin LN's  BREASTS: She left bra on, I did not examine breasts.    Labs:  H/H 9.2/31.6.   RDW 16.7.   Ferritin was 3.  On repeat hemoglobin is 12.3 and ferritin is improved at 24.  Continue FeSO4 1 daily.  MTHFR +, homocystene 9.    Radiology/Diagnostic Studies:  Colonoscopy 1 year ago, Dr. Rush.      Assessment:   (1) 48 y.o. female with diagnosis of bilateral pulmonary emboli,                 hypercoagulation evaluation ordered at [facility]. Essentially NL,       But MTHFR +, and homocystene is NL on multivitamen daily.       Eliquis 5mg 1 BID. X's 6 months, rechecked CAT at 6 months,               1/2018  Was negative for PE.       Stop Eliquis now, continue ASA 81 mg daily.    (2)Anemia 2nd heavy menses.  Discussed iron in making RBC, po iron     with Vitamin C vs Ferrlicet infusion x's 8 weeks to       Replenish Fe stores.  Ferrlicet 1-2% reaction risk to infusion.  Oral     Fe can give constipation, add chronulac syrup to help with       BM.    (3) Continue FeS04 325mg 1 daily, Vitamin C 500mg 1 daily.       Chronulac 1 tbs daily or BID prn.       Check CBC, ferritin 4  Months, return to clinic 4 months.

## 2018-04-24 NOTE — LETTER
April 24, 2018      Rachel Wagner MD  [address]  [address]  [place] [address]           [facility]  [address]  [address]  [place] [address]  Phone: [phone]  Fax: [phone]          Patient: Jalyn Dickson   MR Number: [id number]   YOB: 1970   Date of Visit: 4/24/2018       Dear Dr. Rachel Wagner:    Thank you for referring Jalyn Dickson to me for evaluation. Attached you will find relevant portions of my assessment and plan of care.    If you have questions, please do not hesitate to call me. I look forward to following Jalyn Dickson along with you.    Sincerely,    DELORIS Sims MD    Enclosure  CC:  No Recipients    If you would like to receive this communication electronically, please contact [email or web address] or [phone] to request more information on [employer] access.    For providers and/or their staff who would like to refer a patient to [facility], please contact us through our one-stop-shop provider referral line, [facility] [place], at [phone].    If you feel you have received this communication in error or would no longer like to receive these types of communications, please e-mail [email or web address]

## 2018-04-25 LAB
ALBUMIN SERPL-MCNC: 3.9 G/DL (ref 3.6–5.1)
ALBUMIN/GLOB SERPL: 1.1 (CALC) (ref 1–2.5)
ALP SERPL-CCNC: 56 U/L (ref 33–115)
ALT SERPL-CCNC: 13 U/L (ref 6–29)
AST SERPL-CCNC: 13 U/L (ref 10–35)
BILIRUB SERPL-MCNC: 0.7 MG/DL (ref 0.2–1.2)
BUN SERPL-MCNC: 9 MG/DL (ref 7–25)
BUN/CREAT SERPL: NORMAL (CALC) (ref 6–22)
CALCIUM SERPL-MCNC: 9.4 MG/DL (ref 8.6–10.2)
CHLORIDE SERPL-SCNC: 102 MMOL/L (ref 98–110)
CHOLEST SERPL-MCNC: 209 MG/DL
CHOLEST/HDLC SERPL: 2.3 (CALC)
CO2 SERPL-SCNC: 29 MMOL/L (ref 20–31)
COPY(IES) SENT TO:: NORMAL
CREAT SERPL-MCNC: 0.68 MG/DL (ref 0.5–1.1)
GFR SERPL CREATININE-BSD FRML MDRD: 103 ML/MIN/1.73M2
GLOBULIN SER CALC-MCNC: 3.4 G/DL (CALC) (ref 1.9–3.7)
GLUCOSE SERPL-MCNC: 83 MG/DL (ref 65–99)
HDLC SERPL-MCNC: 89 MG/DL
LDLC SERPL CALC-MCNC: 102 MG/DL (CALC)
NONHDLC SERPL-MCNC: 120 MG/DL (CALC)
POTASSIUM SERPL-SCNC: 4 MMOL/L (ref 3.5–5.3)
PROT SERPL-MCNC: 7.3 G/DL (ref 6.1–8.1)
SODIUM SERPL-SCNC: 138 MMOL/L (ref 135–146)
TRIGL SERPL-MCNC: 86 MG/DL

## 2018-05-03 ENCOUNTER — TELEPHONE (OUTPATIENT)
Dept: INTERNAL MEDICINE | Facility: CLINIC | Age: 48
End: 2018-05-03

## 2018-05-03 NOTE — TELEPHONE ENCOUNTER
Told the patient and she voiced understanding and will continue to stay off lipitor until recheck at next visit.

## 2018-05-03 NOTE — TELEPHONE ENCOUNTER
----- Message from Mickey Solares NP sent at 5/1/2018 10:54 AM CDT -----  CMP - fine. Total cholesterol & LDL slightly elevated - needs to follow low-fat diet until next visit. Will recheck lipids then.

## 2018-08-31 ENCOUNTER — PATIENT MESSAGE (OUTPATIENT)
Dept: FAMILY MEDICINE | Facility: CLINIC | Age: 48
End: 2018-08-31

## 2018-09-04 ENCOUNTER — PATIENT MESSAGE (OUTPATIENT)
Dept: FAMILY MEDICINE | Facility: CLINIC | Age: 48
End: 2018-09-04

## 2018-09-04 DIAGNOSIS — E78.00 ELEVATED LDL CHOLESTEROL LEVEL: Primary | ICD-10-CM

## 2018-09-18 ENCOUNTER — OFFICE VISIT (OUTPATIENT)
Dept: HEMATOLOGY/ONCOLOGY | Facility: CLINIC | Age: 48
End: 2018-09-18
Payer: COMMERCIAL

## 2018-09-18 VITALS
BODY MASS INDEX: 37.84 KG/M2 | SYSTOLIC BLOOD PRESSURE: 113 MMHG | HEART RATE: 76 BPM | HEIGHT: 64 IN | TEMPERATURE: 98 F | DIASTOLIC BLOOD PRESSURE: 76 MMHG | WEIGHT: 221.63 LBS

## 2018-09-18 DIAGNOSIS — D50.8 IRON DEFICIENCY ANEMIA SECONDARY TO INADEQUATE DIETARY IRON INTAKE: Primary | ICD-10-CM

## 2018-09-18 PROCEDURE — 3078F DIAST BP <80 MM HG: CPT | Mod: ,,, | Performed by: INTERNAL MEDICINE

## 2018-09-18 PROCEDURE — 3008F BODY MASS INDEX DOCD: CPT | Mod: ,,, | Performed by: INTERNAL MEDICINE

## 2018-09-18 PROCEDURE — 99214 OFFICE O/P EST MOD 30 MIN: CPT | Mod: ,,, | Performed by: INTERNAL MEDICINE

## 2018-09-18 PROCEDURE — 3074F SYST BP LT 130 MM HG: CPT | Mod: ,,, | Performed by: INTERNAL MEDICINE

## 2018-09-18 NOTE — PROGRESS NOTES
[facility] History & Physical    Subjective:      Patient ID:   Jalyn Dickson  48 y.o. female  1970  [facility]      Chief Complaint:   Anemia and PE evaluation    HPI:  48 y.o. female with diagnosis of anemia x's several years.  On po Fe intermittently.  No hx B 12 pills or shots.  No jaundice, hepatitis, GB dx.  Son with S.S. Trait.    17 had SOB, L pleuritic chest pain.  Seen in ER.  Bilateral PE, treated with lovenox initially, followed by Eliquix 10mg BID followed by 5 mg BID now.  D/C ed 2018.  On ASA 81mg daily.  SOB better, and DECKER is improved. She rates herself as going from a 2 to a 6 on a scale of 1-10.  Chest pain resolved.  She gave hx of traveling to [place] 2017, prior to PE event.  Has not been on BCP x's 18 years, or HRT.  Repeat CAT 2018 no PE seen.    No DM, PUD, DVT, renal dx, cholesterol, thyroid dx or arthritis.    Hx BTL , thyroglossal cyst removed from neck .  Dr. Daniel Garner, ENT.  [id number]  Heavy menses, monthly, especially since starting Eliquis.  Dr. Knutson, her gynecologist, is considering elective uterine ablation. She is status post bilateral tubal ligation    She is taking her iron pill daily, and is taking lactulose when necessary to help with constipation associated with her iron pill.    The pulmonary embolus was 2017. She will continue on her Eliquis  5 mg twice a day through 2017. At that time she and I will make a decision on continuing reduced dosage or stopping the Eliquis at that time.    Baseline hemoglobin is 9 ferritin is 3. She will continue on oral iron at this time and follow-up CBC and ferritin [facility].    After oral irom, hemoglobin is improved to 12.3 and ferritin is improved to 13. She will continue on oral iron at this time and repeat blood work will be checked in 4 months.    Hyper coagulation studies are beginning to return, and are essentially normal thus far, except MTHFR +---, mutation.  But  homocystene is NL at 9, she takes a multivitamen daily.    On oral Fe, Hgb better at 12.8 from 9.2.  Platelet count trending down at 425,000 with anemia correction ,and Fe replenishment.    Smoke no.  ETOH rarely.  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    Dad DM, PVD, (vascular bypass),  CAD, S/P CABG, HTN cholesterol, recovered heroin addict.  70y/o.  Mom 60 y/o. AMI.  Son A & W.  Others HTN, DM, PVD, heart dx., SS dx and SLE.    ROS:   GEN: normal without any fever, night sweats or weight loss  HEENT: See HPI.   no HA's, sore throat, stiff neck, changes in vision  CV: normal with no CP, SOB, PND, DECKER or orthopnea  PULM: See HPI.  no SOB, cough, hemoptysis, sputum or pleuritic pain  GI: See HPI.   no abdominal pain, nausea, vomiting, constipation, diarrhea, melanotic stools, BRBPR, or hematemesis  : normal with no hematuria, dysuria  BREAST: normal with no mass, discharge, pain  SKIN: normal with no rash, erythema, bruising, or swelling     Past Medical History:   Diagnosis Date    Acid reflux     Allergy     Anxiety     Hypertension     Neck mass     Pulmonary embolism     Sinus drainage      Past Surgical History:   Procedure Laterality Date    EXCISION-MASS N/A 4/29/2014    [facility]    NECK LESION BIOPSY      neck mass removal      TUBAL LIGATION      TUBAL LIGATION         Review of patient's allergies indicates:  No Known Allergies  Social History     Socioeconomic History    Marital status:      Spouse name: Not on file    Number of children: Not on file    Years of education: Not on file    Highest education level: Not on file   Social Needs    Financial resource strain: Not on file    Food insecurity - worry: Not on file    Food insecurity - inability: Not on file    Transportation needs - medical: Not on file    Transportation needs - non-medical: Not on file   Occupational History    Not on file   Tobacco Use    Smoking status: Never Smoker  "   Smokeless tobacco: Never Used   Substance and Sexual Activity    Alcohol use: Yes     Comment: occasional    Drug use: Yes    Sexual activity: Not on file   Other Topics Concern    Not on file   Social History Narrative    Not on file         Current Outpatient Medications:     amLODIPine-valsartan-hcthiazid -12.5 mg Tab, Take 1 tablet by mouth once daily., Disp: 90 tablet, Rfl: 1    apixaban (ELIQUIS) 5 mg Tab, Take 5 mg by mouth 2 (two) times daily., Disp: , Rfl:     ascorbic acid, vitamin C, (VITAMIN C) 1000 MG tablet, Take 1,000 mg by mouth once daily., Disp: , Rfl:     aspirin 81 MG Chew, Take 81 mg by mouth once daily., Disp: , Rfl:     b complex vitamins tablet, Take 1 tablet by mouth once daily., Disp: , Rfl:     cyanocobalamin (VITAMIN B-12) 1000 MCG tablet, Take 100 mcg by mouth once daily., Disp: , Rfl:     ferrous sulfate 325 mg (65 mg iron) Tab tablet, , Disp: , Rfl:     GENERLAC 10 gram/15 mL solution, , Disp: , Rfl:     levocetirizine (XYZAL) 5 MG tablet, Take 5 mg by mouth every evening., Disp: , Rfl:     lorazepam (ATIVAN) 0.5 MG tablet, Take 0.5 mg by mouth every 6 (six) hours as needed., Disp: , Rfl:     omeprazole (PRILOSEC) 20 MG capsule, Take 20 mg by mouth once daily., Disp: , Rfl:     pravastatin (PRAVACHOL) 20 MG tablet, Take 1 tablet (20 mg total) by mouth once daily., Disp: 90 tablet, Rfl: 1    vitamin E 400 UNIT capsule, Take 400 Units by mouth once daily., Disp: , Rfl:           Objective:   Vitals:  Blood pressure 113/76, pulse 76, temperature 98.2 °F (36.8 °C), height 5' 4" (1.626 m), weight 100.5 kg (221 lb 9.6 oz).    Physical Examination:   GEN: no apparent distress, comfortable  HEAD: atraumatic and normocephalic  EYES: no pallor, no icterus  ENT: no pharyngeal erythema, external ears WNL; no nasal discharge; no thrush  NECK: no masses, thyroid normal, trachea midline, no LAD/LN's, supple  CV: RRR with no murmur; normal pulse  CHEST: Normal respiratory " effort; CTAB; normal breath sounds; no wheeze or crackles  ABDOM: nontender and nondistended; soft; normal bowel sounds; no rebound/guarding, liver/spleen not palpable  MUSC/Skeletal: ROM normal; no crepitus; joints normal  EXTREM: no clubbing, cyanosis, inflammation or swelling  SKIN: no rashes, lesions, ulcers, petechiae  NEURO: grossly intact; motor/sensory WNL; no tremors  PSYCH: normal mood, affect and behavior  LYMPH: normal cervical, supraclavicular, axillary and groin LN's  BREASTS: She left bra on, I did not examine breasts.    Labs:  H/H 9.2/31.6.   RDW 16.7.   Ferritin was 3.  On repeat hemoglobin is 12.3 and ferritin is improved at 24.  Continue FeSO4 1 daily.  MTHFR +, homocystene 9.    Radiology/Diagnostic Studies:  Colonoscopy 1 year ago, Dr. Rush.      Assessment:   (1) 48 y.o. female with diagnosis of bilateral pulmonary emboli,                 hypercoagulation evaluation ordered at [facility]. Essentially NL,       But MTHFR +, and homocystene is NL on multivitamen daily.       Eliquis 5mg 1 BID. X's 6 months, rechecked CAT at 6 months,               1/2018  Was negative for PE.       Stop Eliquis 6 months ago, continue ASA 81 mg daily.    (2)Anemia 2nd heavy menses.  Discussed iron in making RBC, po iron     with Vitamin C vs Ferrlicet infusion x's 8 weeks to       Replenish Fe stores.  Ferrlicet 1-2% reaction risk to infusion.  Oral     Fe can give constipation, add chronulac syrup to help with       BM.    (3) Continue FeS04 325mg 1 daily, Vitamin C 500mg 1 daily.       Chronulac 1 tbs daily or BID prn.  Hgb 13.8 and ferritin 25, stable on po Fe.    RTC 4-6 months.  Check [facility]..

## 2018-09-18 NOTE — LETTER
September 18, 2018      Rachel Wagner MD  [address]  [address]  [place] [address]           [facility]  [address]  [address]  [place] [address]  Phone: [phone]  Fax: [phone]          Patient: Jalyn Dickson   MR Number: [id number]   YOB: 1970   Date of Visit: 9/18/2018       Dear Dr. Rachel Wagner:    Thank you for referring Jalyn Dickson to me for evaluation. Attached you will find relevant portions of my assessment and plan of care.    If you have questions, please do not hesitate to call me. I look forward to following Jalyn Dickson along with you.    Sincerely,    DELORIS Sims MD    Enclosure  CC:  No Recipients    If you would like to receive this communication electronically, please contact [email or web address] or [phone] to request more information on [employer] access.    For providers and/or their staff who would like to refer a patient to [facility], please contact us through our one-stop-shop provider referral line, [facility] [place], at [phone].    If you feel you have received this communication in error or would no longer like to receive these types of communications, please e-mail [email or web address]

## 2018-09-26 DIAGNOSIS — D50.0 IRON DEFICIENCY ANEMIA DUE TO CHRONIC BLOOD LOSS: Primary | ICD-10-CM

## 2018-09-29 RX ORDER — FERROUS SULFATE 325(65) MG
TABLET ORAL
Qty: 30 TABLET | Refills: 0 | Status: SHIPPED | OUTPATIENT
Start: 2018-09-29 | End: 2018-11-05 | Stop reason: SDUPTHER

## 2018-10-10 ENCOUNTER — OFFICE VISIT (OUTPATIENT)
Dept: FAMILY MEDICINE | Facility: CLINIC | Age: 48
End: 2018-10-10
Payer: COMMERCIAL

## 2018-10-10 VITALS
RESPIRATION RATE: 16 BRPM | SYSTOLIC BLOOD PRESSURE: 126 MMHG | BODY MASS INDEX: 37.76 KG/M2 | DIASTOLIC BLOOD PRESSURE: 84 MMHG | WEIGHT: 221.19 LBS | OXYGEN SATURATION: 98 % | HEART RATE: 73 BPM | TEMPERATURE: 98 F | HEIGHT: 64 IN

## 2018-10-10 DIAGNOSIS — F41.9 ANXIETY: Primary | ICD-10-CM

## 2018-10-10 DIAGNOSIS — K21.9 GASTROESOPHAGEAL REFLUX DISEASE WITHOUT ESOPHAGITIS: ICD-10-CM

## 2018-10-10 DIAGNOSIS — I10 ESSENTIAL HYPERTENSION: ICD-10-CM

## 2018-10-10 PROCEDURE — 3008F BODY MASS INDEX DOCD: CPT | Mod: ,,, | Performed by: NURSE PRACTITIONER

## 2018-10-10 PROCEDURE — 99214 OFFICE O/P EST MOD 30 MIN: CPT | Mod: ,,, | Performed by: NURSE PRACTITIONER

## 2018-10-10 PROCEDURE — 3079F DIAST BP 80-89 MM HG: CPT | Mod: ,,, | Performed by: NURSE PRACTITIONER

## 2018-10-10 PROCEDURE — 3074F SYST BP LT 130 MM HG: CPT | Mod: ,,, | Performed by: NURSE PRACTITIONER

## 2018-10-10 RX ORDER — LORAZEPAM 0.5 MG/1
0.5 TABLET ORAL EVERY 6 HOURS PRN
Qty: 30 TABLET | Refills: 2 | Status: SHIPPED | OUTPATIENT
Start: 2018-10-10 | End: 2020-01-22 | Stop reason: SDUPTHER

## 2018-10-10 RX ORDER — AMLODIPINE, VALSARTAN AND HYDROCHLOROTHIAZIDE 10; 160; 12.5 MG/1; MG/1; MG/1
1 TABLET ORAL DAILY
Qty: 90 TABLET | Refills: 1 | Status: SHIPPED | OUTPATIENT
Start: 2018-10-10 | End: 2019-03-09 | Stop reason: SDUPTHER

## 2018-10-10 NOTE — PATIENT INSTRUCTIONS
4 Steps for Eating Healthier  Changing the way you eat can improve your health. It can lower your cholesterol and blood pressure, and help you stay at a healthy weight. Your diet doesnt have to be bland and boring to be healthy. Just watch your calories and follow these steps:    1. Eat fewer unhealthy fats  · Choose more fish and lean meats instead of fatty cuts of meat.  · Skip butter and lard, and use less margarine.  · Pass on foods that have palm, coconut, or hydrogenated oils.  · Eat fewer high-fat dairy foods like cheese, ice cream, and whole milk.  · Get a heart-healthy cookbook and try some low-fat recipes.  2. Go light on salt  · Keep the saltshaker off the table.  · Limit high-salt ingredients, such as soy sauce, bouillon, and garlic salt.  · Instead of adding salt when cooking, season your food with herbs and flavorings. Try lemon, garlic, and onion.  · Limit convenience foods, such as boxed or canned foods and restaurant food.  · Read food labels and choose lower-sodium options.  3. Limit sugar  · Pause before you add sugars to pancakes, cereal, coffee, or tea. This includes white and brown table sugar, syrup, honey, and molasses. Cut your usual amount by half.  · Use non-sugar sweeteners. Stevia, aspartame, and sucralose can satisfy a sweet tooth without adding calories.  · Swap out sugar-filled soda and other drinks. Buy sugar-free or low-calorie beverages. Remember water is always the best choice.  · Read labels and choose foods with less added sugar. Keep in mind that dairy foods and foods with fruit will have some natural sugar.  · Cut the sugar in recipes by 1/3 to 1/2. Boost the flavor with extracts like almond, vanilla, or orange. Or add spices such as cinnamon or nutmeg.  4. Eat more fiber  · Eat fresh fruits and vegetables every day.  · Boost your diet with whole grains. Go for oats, whole-grain rice, and bran.  · Add beans and lentils to your meals.  · Drink more water to match your fiber  increase. This is to help prevent constipation.  Date Last Reviewed: 5/11/2015  © 9604-9747 [employer]. [address] [place] [address], [place] [address]. All rights reserved. This information is not intended as a substitute for professional medical care. Always follow your healthcare professional's instructions.

## 2018-10-10 NOTE — PROGRESS NOTES
SUBJECTIVE:      Patient ID: Jalyn Dickson is a 48 y.o. female.    Chief Complaint: Follow-up (6 month follow up) and Hypertension    FU for anxiety - has been taking care of  while laid up at home s/p TKA, started new job with extra training, feeling additional anxiety, taking lorazepam more often than she has in past due to situational stress     Discussed outstanding health maintenance - mammo & pap next week with Dr. Knutson      Anxiety   Presents for follow-up visit. Symptoms include irritability and nervous/anxious behavior. Patient reports no chest pain, compulsions, confusion, decreased concentration, depressed mood, dizziness, dry mouth, excessive worry, feeling of choking, hyperventilation, impotence, insomnia, malaise, muscle tension, nausea, obsessions, palpitations, panic, restlessness, shortness of breath or suicidal ideas. Symptoms occur most days. The severity of symptoms is moderate. The quality of sleep is good.     Compliance with medications is %.   Hypertension   This is a chronic problem. The current episode started more than 1 year ago. The problem is controlled. Associated symptoms include anxiety. Pertinent negatives include no blurred vision, chest pain, headaches, malaise/fatigue, neck pain, orthopnea, palpitations, peripheral edema, PND, shortness of breath or sweats. Risk factors for coronary artery disease include obesity, sedentary lifestyle, stress and family history. Past treatments include calcium channel blockers, angiotensin blockers and diuretics. The current treatment provides significant improvement. Compliance problems include exercise and diet.  There is no history of CAD/MI or CVA. There is no history of sleep apnea or a thyroid problem.       Past Surgical History:   Procedure Laterality Date    EXCISION-MASS N/A 4/29/2014    [facility]    NECK LESION BIOPSY      neck mass removal      TUBAL LIGATION  1999     Family  History   Problem Relation Age of Onset    Heart disease Mother     Coronary artery disease Father     Peripheral vascular disease Father     Diabetes Father     Hypertension Father     Hypertension Maternal Grandmother     No Known Problems Maternal Grandfather     PDD Paternal Grandfather     Hypertension Paternal Grandfather     No Known Problems Son       Social History     Socioeconomic History    Marital status:      Spouse name: None    Number of children: None    Years of education: None    Highest education level: None   Social Needs    Financial resource strain: None    Food insecurity - worry: None    Food insecurity - inability: None    Transportation needs - medical: None    Transportation needs - non-medical: None   Occupational History    None   Tobacco Use    Smoking status: Never Smoker    Smokeless tobacco: Never Used   Substance and Sexual Activity    Alcohol use: Yes     Alcohol/week: 1.2 oz     Types: 2 Glasses of wine per week     Comment: occasional    Drug use: Yes    Sexual activity: None   Other Topics Concern    None   Social History Narrative    None     Current Outpatient Medications   Medication Sig Dispense Refill    amLODIPine-valsartan-hcthiazid -12.5 mg Tab Take 1 tablet by mouth once daily. 90 tablet 1    ascorbic acid, vitamin C, (VITAMIN C) 1000 MG tablet Take 1,000 mg by mouth once daily.      aspirin 81 MG Chew Take 81 mg by mouth once daily.      b complex vitamins tablet Take 1 tablet by mouth once daily.      cyanocobalamin (VITAMIN B-12) 1000 MCG tablet Take 100 mcg by mouth once daily.      ferrous sulfate (FEOSOL) 325 mg (65 mg iron) Tab tablet TAKE 1 TABLET BY MOUTH EVERY DAY 30 tablet 0    GENERLAC 10 gram/15 mL solution       levocetirizine (XYZAL) 5 MG tablet Take 5 mg by mouth every evening.      LORazepam (ATIVAN) 0.5 MG tablet Take 1 tablet (0.5 mg total) by mouth every 6 (six) hours as needed. 30 tablet 2     omeprazole (PRILOSEC) 20 MG capsule Take 20 mg by mouth once daily.      vitamin E 400 UNIT capsule Take 400 Units by mouth once daily.       No current facility-administered medications for this visit.      Review of patient's allergies indicates:  No Known Allergies   Past Medical History:   Diagnosis Date    Acid reflux     Allergy     Anxiety     Hypertension     Neck mass     Pulmonary embolism     Sinus drainage      Past Surgical History:   Procedure Laterality Date    EXCISION-MASS N/A 4/29/2014    [facility]    NECK LESION BIOPSY      neck mass removal      TUBAL LIGATION  1999       Review of Systems   Constitutional: Positive for irritability. Negative for activity change, appetite change, fatigue, malaise/fatigue and unexpected weight change.   HENT: Negative for congestion, ear pain, hearing loss, postnasal drip, rhinorrhea, sinus pressure, sinus pain, sneezing, sore throat and trouble swallowing.    Eyes: Negative for blurred vision, photophobia, pain, discharge and visual disturbance.   Respiratory: Negative for cough, chest tightness, shortness of breath and wheezing.    Cardiovascular: Negative for chest pain, palpitations, orthopnea, leg swelling and PND.   Gastrointestinal: Negative for abdominal distention, abdominal pain, blood in stool, constipation, diarrhea, nausea and vomiting.   Endocrine: Negative for cold intolerance, heat intolerance, polydipsia and polyuria.   Genitourinary: Negative for difficulty urinating, dysuria, flank pain, frequency, hematuria, impotence, menstrual problem, pelvic pain and urgency.   Musculoskeletal: Negative for arthralgias, back pain, joint swelling, myalgias and neck pain.   Skin: Negative for pallor.   Allergic/Immunologic: Negative for environmental allergies and food allergies.   Neurological: Negative for dizziness, weakness, light-headedness, numbness and headaches.   Hematological: Does not bruise/bleed  "easily.   Psychiatric/Behavioral: Negative for agitation, confusion, decreased concentration, dysphoric mood, sleep disturbance and suicidal ideas. The patient is nervous/anxious. The patient does not have insomnia.       OBJECTIVE:      Vitals:    10/10/18 1628   BP: 126/84   Pulse: 73   Resp: 16   Temp: 98.3 °F (36.8 °C)   TempSrc: Oral   SpO2: 98%   Weight: 100.3 kg (221 lb 3.2 oz)   Height: 5' 4" (1.626 m)     Physical Exam   Constitutional: She is oriented to person, place, and time. Vital signs are normal. She appears well-developed and well-nourished. No distress.   obese   HENT:   Head: Normocephalic and atraumatic.   Right Ear: Hearing normal.   Left Ear: Hearing normal.   Nose: Nose normal. No rhinorrhea.   Mouth/Throat: Mucous membranes are normal.   Eyes: Conjunctivae and lids are normal. Pupils are equal, round, and reactive to light. Right eye exhibits no discharge. Left eye exhibits no discharge. Right conjunctiva is not injected. Left conjunctiva is not injected. Right pupil is round and reactive. Left pupil is round and reactive. Pupils are equal.   Neck: Trachea normal and normal range of motion. Neck supple. No JVD present. No tracheal deviation present. No thyromegaly present.   Cardiovascular: Normal rate, regular rhythm, normal heart sounds and intact distal pulses. Exam reveals no gallop and no friction rub.   No murmur heard.  Pulses:       Radial pulses are 2+ on the right side, and 2+ on the left side.   Pulmonary/Chest: Effort normal and breath sounds normal. No stridor. No respiratory distress. She has no decreased breath sounds. She has no wheezes. She has no rhonchi. She has no rales.   Abdominal: Soft. Bowel sounds are normal. She exhibits no distension. There is no tenderness. There is no rigidity and no guarding.   Musculoskeletal: Normal range of motion. She exhibits no edema.   Lymphadenopathy:     She has no cervical adenopathy.   Neurological: She is alert and oriented to person, " place, and time. She has normal strength. She displays no atrophy. She displays a negative Romberg sign. Coordination and gait normal.   Skin: Skin is warm and dry. Capillary refill takes less than 2 seconds. No lesion and no rash noted. No cyanosis. No pallor.   Psychiatric: She has a normal mood and affect. Her speech is normal and behavior is normal. Judgment and thought content normal. Cognition and memory are normal. She is attentive.   Nursing note and vitals reviewed.     Assessment:       1. Anxiety    2. Essential hypertension    3. Gastroesophageal reflux disease without esophagitis        Plan:       Anxiety  -     LORazepam (ATIVAN) 0.5 MG tablet; Take 1 tablet (0.5 mg total) by mouth every 6 (six) hours as needed.  Dispense: 30 tablet; Refill: 2    Essential hypertension  -     amLODIPine-valsartan-hcthiazid -12.5 mg Tab; Take 1 tablet by mouth once daily.  Dispense: 90 tablet; Refill: 1    Gastroesophageal reflux disease without esophagitis   - stable    Follow-up in about 6 months (around 4/10/2019) for HTN refills.      10/10/2018 ALIRIO Randall, FNP-C

## 2018-11-05 DIAGNOSIS — D50.0 IRON DEFICIENCY ANEMIA DUE TO CHRONIC BLOOD LOSS: ICD-10-CM

## 2018-11-05 RX ORDER — FERROUS SULFATE 325(65) MG
TABLET ORAL
Qty: 30 TABLET | Refills: 0 | Status: SHIPPED | OUTPATIENT
Start: 2018-11-05 | End: 2018-12-07 | Stop reason: SDUPTHER

## 2018-12-07 DIAGNOSIS — D50.0 IRON DEFICIENCY ANEMIA DUE TO CHRONIC BLOOD LOSS: ICD-10-CM

## 2018-12-07 RX ORDER — FERROUS SULFATE 325(65) MG
TABLET ORAL
Qty: 30 TABLET | Refills: 0 | Status: SHIPPED | OUTPATIENT
Start: 2018-12-07 | End: 2020-01-22

## 2019-03-09 DIAGNOSIS — I10 ESSENTIAL HYPERTENSION: ICD-10-CM

## 2019-03-11 RX ORDER — AMLODIPINE, VALSARTAN AND HYDROCHLOROTHIAZIDE 10; 160; 12.5 MG/1; MG/1; MG/1
TABLET ORAL
Qty: 90 TABLET | Refills: 1 | Status: SHIPPED | OUTPATIENT
Start: 2019-03-11 | End: 2019-08-08 | Stop reason: SDUPTHER

## 2019-08-08 DIAGNOSIS — I10 ESSENTIAL HYPERTENSION: ICD-10-CM

## 2019-08-08 RX ORDER — AMLODIPINE, VALSARTAN AND HYDROCHLOROTHIAZIDE 10; 160; 12.5 MG/1; MG/1; MG/1
TABLET ORAL
Qty: 30 TABLET | Refills: 0 | Status: SHIPPED | OUTPATIENT
Start: 2019-08-08 | End: 2020-01-22 | Stop reason: SDUPTHER

## 2019-09-03 DIAGNOSIS — I10 ESSENTIAL HYPERTENSION: ICD-10-CM

## 2019-09-03 RX ORDER — AMLODIPINE, VALSARTAN AND HYDROCHLOROTHIAZIDE 10; 160; 12.5 MG/1; MG/1; MG/1
TABLET ORAL
Qty: 30 TABLET | Refills: 0 | OUTPATIENT
Start: 2019-09-03

## 2019-10-27 ENCOUNTER — OFFICE VISIT (OUTPATIENT)
Dept: URGENT CARE | Facility: CLINIC | Age: 49
End: 2019-10-27
Payer: COMMERCIAL

## 2019-10-27 VITALS
OXYGEN SATURATION: 96 % | SYSTOLIC BLOOD PRESSURE: 131 MMHG | DIASTOLIC BLOOD PRESSURE: 86 MMHG | RESPIRATION RATE: 18 BRPM | TEMPERATURE: 98 F | HEART RATE: 86 BPM | HEIGHT: 64 IN | BODY MASS INDEX: 39.44 KG/M2 | WEIGHT: 231 LBS

## 2019-10-27 DIAGNOSIS — J02.9 SORE THROAT: ICD-10-CM

## 2019-10-27 DIAGNOSIS — J02.0 STREP PHARYNGITIS: Primary | ICD-10-CM

## 2019-10-27 LAB
CTP QC/QA: YES
CTP QC/QA: YES
HETEROPH AB SER QL: NEGATIVE
S PYO RRNA THROAT QL PROBE: NEGATIVE

## 2019-10-27 PROCEDURE — 3079F PR MOST RECENT DIASTOLIC BLOOD PRESSURE 80-89 MM HG: ICD-10-PCS | Mod: CPTII,S$GLB,, | Performed by: NURSE PRACTITIONER

## 2019-10-27 PROCEDURE — 3008F PR BODY MASS INDEX (BMI) DOCUMENTED: ICD-10-PCS | Mod: CPTII,S$GLB,, | Performed by: NURSE PRACTITIONER

## 2019-10-27 PROCEDURE — 3079F DIAST BP 80-89 MM HG: CPT | Mod: CPTII,S$GLB,, | Performed by: NURSE PRACTITIONER

## 2019-10-27 PROCEDURE — 87880 POCT RAPID STREP A: ICD-10-PCS | Mod: QW,,, | Performed by: NURSE PRACTITIONER

## 2019-10-27 PROCEDURE — 86308 POCT INFECTIOUS MONONUCLEOSIS: ICD-10-PCS | Mod: QW,,, | Performed by: NURSE PRACTITIONER

## 2019-10-27 PROCEDURE — 87880 STREP A ASSAY W/OPTIC: CPT | Mod: QW,,, | Performed by: NURSE PRACTITIONER

## 2019-10-27 PROCEDURE — 3008F BODY MASS INDEX DOCD: CPT | Mod: CPTII,S$GLB,, | Performed by: NURSE PRACTITIONER

## 2019-10-27 PROCEDURE — 99204 OFFICE O/P NEW MOD 45 MIN: CPT | Mod: 25,S$GLB,, | Performed by: NURSE PRACTITIONER

## 2019-10-27 PROCEDURE — 3075F SYST BP GE 130 - 139MM HG: CPT | Mod: CPTII,S$GLB,, | Performed by: NURSE PRACTITIONER

## 2019-10-27 PROCEDURE — 3075F PR MOST RECENT SYSTOLIC BLOOD PRESS GE 130-139MM HG: ICD-10-PCS | Mod: CPTII,S$GLB,, | Performed by: NURSE PRACTITIONER

## 2019-10-27 PROCEDURE — 86308 HETEROPHILE ANTIBODY SCREEN: CPT | Mod: QW,,, | Performed by: NURSE PRACTITIONER

## 2019-10-27 PROCEDURE — 99204 PR OFFICE/OUTPT VISIT, NEW, LEVL IV, 45-59 MIN: ICD-10-PCS | Mod: 25,S$GLB,, | Performed by: NURSE PRACTITIONER

## 2019-10-27 RX ORDER — AMOXICILLIN 500 MG/1
500 CAPSULE ORAL EVERY 12 HOURS
Qty: 20 CAPSULE | Refills: 0 | Status: SHIPPED | OUTPATIENT
Start: 2019-10-27 | End: 2019-11-06

## 2019-10-27 NOTE — PATIENT INSTRUCTIONS
Pharyngitis: Strep (Presumed)    You have pharyngitis (sore throat). The cause is thought to be the streptococcus, or strep, bacterium. Strep throat infection can cause throat pain that is worse when swallowing, aching all over, headache, and fever. The infection may be spread by coughing, kissing, or touching others after touching your mouth or nose. Antibiotic medications are given to treat the infection.  Home care  · Rest at home. Drink plenty of fluids to avoid dehydration.  · No work or school for the first 2 days of taking the antibiotics. After this time, you will not be contagious. You can then return to work or school if you are feeling better.   · The antibiotic medication must be taken for the full 10 days, even if you feel better. This is very important to ensure the infection is treated. It is also important to prevent drug-resistant organisms from developing. If you were given an antibiotic shot, no more antibiotics are needed.  · You may use acetaminophen or ibuprofen to control pain or fever, unless another medicine was prescribed for this. If you have chronic liver or kidney disease or ever had a stomach ulcer or GI bleeding, talk with your doctor before using these medicines.  · Throat lozenges or a throat-numbing sprays can help reduce throat pain. Gargling with warm salt water can also help. Dissolve 1/2 teaspoon of salt in 1 8 ounce glass of warm water.   · Avoid salty or spicy foods, which can irritate the throat.  Follow-up care  Follow up with your healthcare provider or our staff if you are not improving over the next week.  When to seek medical advice  Call your healthcare provider right away if any of these occur:  · Fever as directed by your doctor.   · New or worsening ear pain, sinus pain, or headache  · Painful lumps in the back of neck  · Stiff neck  · Lymph nodes are getting larger  · Inability to swallow liquids, excessive drooling, or inability to open mouth wide due to throat  pain  · Signs of dehydration (very dark urine or no urine, sunken eyes, dizziness)  · Trouble breathing or noisy breathing  · Muffled voice  · New rash  Date Last Reviewed: 4/13/2015  © 0112-0318 [employer]. [address] [place] [address], [place] [address]. All rights reserved. This information is not intended as a substitute for professional medical care. Always follow your healthcare professional's instructions.

## 2019-10-27 NOTE — PROGRESS NOTES
"Subjective:       Patient ID: Jalyn Dickson is a 49 y.o. female.    Vitals:  height is 5' 4" (1.626 m) and weight is 104.8 kg (231 lb). Her temperature is 97.8 °F (36.6 °C). Her blood pressure is 131/86 and her pulse is 86. Her respiration is 18 and oxygen saturation is 96%.     Chief Complaint: Sore Throat    Jalyn Dickson is a 49 year old female presenting to the clinic with c/o sore throat that began two days ago. She has had no other symptoms and denies fever, cough, congestion, rhinorrhea. SHe has taken no medications for her symptoms. She is tolerating PO intake without difficulty (pain but able to swallow).     Sore Throat    This is a new problem. Episode onset: friday. The problem has been rapidly worsening. The pain is worse on the right side. Associated symptoms include swollen glands. Pertinent negatives include no congestion, coughing, diarrhea, headaches, shortness of breath or vomiting.       Constitution: Negative for chills, fatigue and fever.   HENT: Positive for sore throat. Negative for congestion.    Neck: Negative for painful lymph nodes.   Cardiovascular: Negative for chest pain and leg swelling.   Eyes: Negative for double vision and blurred vision.   Respiratory: Negative for cough and shortness of breath.    Gastrointestinal: Negative for nausea, vomiting and diarrhea.   Genitourinary: Negative for dysuria, frequency, urgency and history of kidney stones.   Musculoskeletal: Negative for joint pain, joint swelling, muscle cramps and muscle ache.   Skin: Negative for color change, pale, rash and bruising.   Allergic/Immunologic: Negative for seasonal allergies.   Neurological: Negative for dizziness, history of vertigo, light-headedness, passing out and headaches.   Hematologic/Lymphatic: Negative for swollen lymph nodes.   Psychiatric/Behavioral: Negative for nervous/anxious, sleep disturbance and depression. The patient is not nervous/anxious.        Objective:      Physical Exam "   Constitutional: She is oriented to person, place, and time. She appears well-developed and well-nourished. She is cooperative.  Non-toxic appearance. She does not appear ill. No distress.   HENT:   Head: Normocephalic and atraumatic.   Right Ear: Hearing, tympanic membrane, external ear and ear canal normal.   Left Ear: Hearing, tympanic membrane, external ear and ear canal normal.   Nose: Nose normal. No mucosal edema, rhinorrhea or nasal deformity. No epistaxis. Right sinus exhibits no maxillary sinus tenderness and no frontal sinus tenderness. Left sinus exhibits no maxillary sinus tenderness and no frontal sinus tenderness.   Mouth/Throat: Uvula is midline and mucous membranes are normal. No trismus in the jaw. Normal dentition. No uvula swelling. Posterior oropharyngeal erythema present. Tonsillar exudate.   Eyes: Conjunctivae and lids are normal. Right eye exhibits no discharge. Left eye exhibits no discharge. No scleral icterus.   Neck: Trachea normal, normal range of motion, full passive range of motion without pain and phonation normal. Neck supple.   Cardiovascular: Normal rate, regular rhythm, normal heart sounds, intact distal pulses and normal pulses.   Pulmonary/Chest: Effort normal and breath sounds normal. No respiratory distress.   Abdominal: Soft. Normal appearance and bowel sounds are normal. She exhibits no distension, no pulsatile midline mass and no mass. There is no tenderness.   Musculoskeletal: Normal range of motion. She exhibits no edema or deformity.   Neurological: She is alert and oriented to person, place, and time. She exhibits normal muscle tone. Coordination normal.   Skin: Skin is warm, dry, intact, not diaphoretic and not pale.   Psychiatric: She has a normal mood and affect. Her speech is normal and behavior is normal. Judgment and thought content normal. Cognition and memory are normal.   Nursing note and vitals reviewed.        Assessment:       1. Strep pharyngitis    2.  Sore throat        Plan:       Based upon patient's history and physical exam I believe they have streptococcal pharyngitis.  I do not see any evidence of peritonsillar abscess, retropharyngeal abscess, sepsis, meningitis, epiglotitis, airway difficulty, or severe dehydration.  The patient will be treated with amoxicillin, given specific return precautions, and instructed to followup with her regular doctor as needed. Culture will be sent as well. She has exudate, peritonsillar erythema, and lymphadenopathy so will treat for presumptive strep pharyngitis.     Strep pharyngitis  -     POCT Infectious mononucleosis antibody    Sore throat  -     POCT rapid strep A  -     Culture, Aerobic and Anaerobic  -     POCT Infectious mononucleosis antibody    Other orders  -     amoxicillin (AMOXIL) 500 MG capsule; Take 1 capsule (500 mg total) by mouth every 12 (twelve) hours. for 10 days  Dispense: 20 capsule; Refill: 0

## 2019-11-03 LAB
BACTERIA SPEC AEROBE CULT: ABNORMAL
BACTERIA SPEC ANAEROBE CULT: ABNORMAL
BACTERIA SPEC CULT: ABNORMAL

## 2019-11-26 ENCOUNTER — TELEPHONE (OUTPATIENT)
Dept: URGENT CARE | Facility: CLINIC | Age: 49
End: 2019-11-26

## 2019-11-26 NOTE — TELEPHONE ENCOUNTER
----- Message from LINETTE Chacon sent at 11/26/2019  8:57 AM CST -----  Culture results positive for beta hemolytic strep group G, prescribed amoxicillin, which is appropriate. Please call to notify patient

## 2020-01-22 ENCOUNTER — OFFICE VISIT (OUTPATIENT)
Dept: FAMILY MEDICINE | Facility: CLINIC | Age: 50
End: 2020-01-22
Payer: COMMERCIAL

## 2020-01-22 VITALS
SYSTOLIC BLOOD PRESSURE: 128 MMHG | TEMPERATURE: 98 F | OXYGEN SATURATION: 97 % | WEIGHT: 232.19 LBS | DIASTOLIC BLOOD PRESSURE: 80 MMHG | HEART RATE: 77 BPM | RESPIRATION RATE: 16 BRPM | BODY MASS INDEX: 39.64 KG/M2 | HEIGHT: 64 IN

## 2020-01-22 DIAGNOSIS — E78.00 PURE HYPERCHOLESTEROLEMIA: ICD-10-CM

## 2020-01-22 DIAGNOSIS — I10 ESSENTIAL HYPERTENSION: ICD-10-CM

## 2020-01-22 DIAGNOSIS — F41.9 ANXIETY: Primary | ICD-10-CM

## 2020-01-22 PROCEDURE — 3008F PR BODY MASS INDEX (BMI) DOCUMENTED: ICD-10-PCS | Mod: S$GLB,,, | Performed by: NURSE PRACTITIONER

## 2020-01-22 PROCEDURE — 3008F BODY MASS INDEX DOCD: CPT | Mod: S$GLB,,, | Performed by: NURSE PRACTITIONER

## 2020-01-22 PROCEDURE — 99214 PR OFFICE/OUTPT VISIT, EST, LEVL IV, 30-39 MIN: ICD-10-PCS | Mod: S$GLB,,, | Performed by: NURSE PRACTITIONER

## 2020-01-22 PROCEDURE — 3074F PR MOST RECENT SYSTOLIC BLOOD PRESSURE < 130 MM HG: ICD-10-PCS | Mod: S$GLB,,, | Performed by: NURSE PRACTITIONER

## 2020-01-22 PROCEDURE — 99214 OFFICE O/P EST MOD 30 MIN: CPT | Mod: S$GLB,,, | Performed by: NURSE PRACTITIONER

## 2020-01-22 PROCEDURE — 3079F PR MOST RECENT DIASTOLIC BLOOD PRESSURE 80-89 MM HG: ICD-10-PCS | Mod: S$GLB,,, | Performed by: NURSE PRACTITIONER

## 2020-01-22 PROCEDURE — 3079F DIAST BP 80-89 MM HG: CPT | Mod: S$GLB,,, | Performed by: NURSE PRACTITIONER

## 2020-01-22 PROCEDURE — 3074F SYST BP LT 130 MM HG: CPT | Mod: S$GLB,,, | Performed by: NURSE PRACTITIONER

## 2020-01-22 RX ORDER — AMLODIPINE, VALSARTAN AND HYDROCHLOROTHIAZIDE 10; 160; 12.5 MG/1; MG/1; MG/1
1 TABLET ORAL DAILY
Qty: 90 TABLET | Refills: 3 | Status: SHIPPED | OUTPATIENT
Start: 2020-01-22 | End: 2020-11-11 | Stop reason: SDUPTHER

## 2020-01-22 RX ORDER — VALACYCLOVIR HYDROCHLORIDE 500 MG/1
500 TABLET, FILM COATED ORAL DAILY
COMMUNITY
End: 2020-11-19

## 2020-01-22 RX ORDER — LORAZEPAM 0.5 MG/1
0.5 TABLET ORAL DAILY PRN
Qty: 30 TABLET | Refills: 2 | Status: SHIPPED | OUTPATIENT
Start: 2020-01-22 | End: 2020-11-11 | Stop reason: SDUPTHER

## 2020-01-22 NOTE — PROGRESS NOTES
SUBJECTIVE:      Patient ID: Jalyn Dickson is a 49 y.o. female.    Chief Complaint: Medication Refill (exforge); Hypertension; and Anxiety (ativan)    Patient lost to follow-up since 10/2018, states her job has not afforded her the opportunity to return more regularly, she has been doing well with no physical complaints, states she had health screening labs done through her employer 09/25/2019:  Total cholesterol 208, HDL 71, , trigs 81, fasting glucose 91, A1c 5.6    Discussed outstanding health maintenance - Pap and mammogram with Dr. Knutson October 2019, refused flu shot at this visit    Anxiety   Presents for follow-up visit. Symptoms include nervous/anxious behavior. Patient reports no chest pain, compulsions, confusion, decreased concentration, depressed mood, dizziness, dry mouth, excessive worry, feeling of choking, hyperventilation, impotence, insomnia, irritability, malaise, muscle tension, nausea, obsessions, palpitations, panic, restlessness, shortness of breath or suicidal ideas. Symptoms occur occasionally. The severity of symptoms is mild. The quality of sleep is good.     Compliance with medications is %.   Hypertension   This is a chronic problem. The current episode started more than 1 year ago. The problem is controlled. Associated symptoms include anxiety. Pertinent negatives include no blurred vision, chest pain, headaches, malaise/fatigue, neck pain, orthopnea, palpitations, peripheral edema, PND, shortness of breath or sweats. Risk factors for coronary artery disease include obesity, sedentary lifestyle, stress, family history and dyslipidemia. Past treatments include calcium channel blockers, angiotensin blockers and diuretics. The current treatment provides moderate improvement. Compliance problems include exercise and diet.  There is no history of CAD/MI or CVA. There is no history of sleep apnea or a thyroid problem.   Hyperlipidemia   This is a new problem. This is a  new diagnosis. The problem is uncontrolled. Recent lipid tests were reviewed and are high. Exacerbating diseases include obesity. Factors aggravating her hyperlipidemia include fatty foods and thiazides. Pertinent negatives include no chest pain, myalgias or shortness of breath. She is currently on no antihyperlipidemic treatment. Compliance problems include adherence to diet and adherence to exercise.  Risk factors for coronary artery disease include dyslipidemia, hypertension, obesity, a sedentary lifestyle, stress and family history.       Past Surgical History:   Procedure Laterality Date    NECK LESION BIOPSY      neck mass removal      TUBAL LIGATION  1999     Family History   Problem Relation Age of Onset    Heart disease Mother     Coronary artery disease Father     Peripheral vascular disease Father     Diabetes Father     Hypertension Father     Hypertension Maternal Grandmother     No Known Problems Maternal Grandfather     PDD Paternal Grandfather     Hypertension Paternal Grandfather     No Known Problems Son       Social History     Socioeconomic History    Marital status:      Spouse name: Not on file    Number of children: Not on file    Years of education: Not on file    Highest education level: Not on file   Occupational History    Not on file   Social Needs    Financial resource strain: Not on file    Food insecurity:     Worry: Not on file     Inability: Not on file    Transportation needs:     Medical: Not on file     Non-medical: Not on file   Tobacco Use    Smoking status: Never Smoker    Smokeless tobacco: Never Used   Substance and Sexual Activity    Alcohol use: Yes     Alcohol/week: 2.0 standard drinks     Types: 2 Glasses of wine per week     Comment: occasional    Drug use: Yes    Sexual activity: Not on file   Lifestyle    Physical activity:     Days per week: Not on file     Minutes per session: Not on file    Stress: To some extent   Relationships     Social connections:     Talks on phone: Not on file     Gets together: Not on file     Attends [employer] service: Not on file     Active member of club or organization: Not on file     Attends meetings of clubs or organizations: Not on file     Relationship status: Not on file   Other Topics Concern    Not on file   Social History Narrative    Not on file     Current Outpatient Medications   Medication Sig Dispense Refill    amLODIPine-valsartan-hcthiazid -12.5 mg Tab Take 1 tablet by mouth once daily. 90 tablet 3    ascorbic acid, vitamin C, (VITAMIN C) 1000 MG tablet Take 1,000 mg by mouth once daily.      aspirin 81 MG Chew Take 81 mg by mouth once daily.      b complex vitamins tablet Take 1 tablet by mouth once daily.      cyanocobalamin (VITAMIN B-12) 1000 MCG tablet Take 100 mcg by mouth once daily.      GENERLAC 10 gram/15 mL solution       levocetirizine (XYZAL) 5 MG tablet Take 5 mg by mouth every evening.      LORazepam (ATIVAN) 0.5 MG tablet Take 1 tablet (0.5 mg total) by mouth daily as needed for Anxiety. 30 tablet 2    omeprazole (PRILOSEC) 20 MG capsule Take 20 mg by mouth once daily.      vitamin E 400 UNIT capsule Take 400 Units by mouth once daily.      valACYclovir (VALTREX) 500 MG tablet valacyclovir 500 mg tablet       No current facility-administered medications for this visit.      Review of patient's allergies indicates:  No Known Allergies   Past Medical History:   Diagnosis Date    Acid reflux     Allergy     Anxiety     Hypertension     Neck mass     Pulmonary embolism     Sinus drainage      Past Surgical History:   Procedure Laterality Date    NECK LESION BIOPSY      neck mass removal      TUBAL LIGATION  1999       Review of Systems   Constitutional: Negative for activity change, appetite change, fatigue, irritability, malaise/fatigue and unexpected weight change.   HENT: Negative for congestion, ear pain, hearing loss, postnasal drip, rhinorrhea, sinus  "pressure, sinus pain, sneezing, sore throat and trouble swallowing.    Eyes: Negative for blurred vision, photophobia, pain, discharge and visual disturbance.   Respiratory: Negative for cough, chest tightness, shortness of breath and wheezing.    Cardiovascular: Negative for chest pain, palpitations, orthopnea, leg swelling and PND.   Gastrointestinal: Negative for abdominal distention, abdominal pain, blood in stool, constipation, diarrhea, nausea and vomiting.   Endocrine: Negative for cold intolerance, heat intolerance, polydipsia and polyuria.   Genitourinary: Negative for difficulty urinating, dysuria, flank pain, frequency, hematuria, impotence, menstrual problem, pelvic pain and urgency.   Musculoskeletal: Negative for arthralgias, back pain, joint swelling, myalgias and neck pain.   Skin: Negative for pallor.   Allergic/Immunologic: Negative for environmental allergies and food allergies.   Neurological: Negative for dizziness, weakness, light-headedness, numbness and headaches.   Hematological: Does not bruise/bleed easily.   Psychiatric/Behavioral: Negative for agitation, confusion, decreased concentration, dysphoric mood, sleep disturbance and suicidal ideas. The patient is nervous/anxious. The patient does not have insomnia.       OBJECTIVE:      Vitals:    01/22/20 1600   BP: 128/80   Pulse: 77   Resp: 16   Temp: 98.3 °F (36.8 °C)   SpO2: 97%   Weight: 105.3 kg (232 lb 3.2 oz)   Height: 5' 4" (1.626 m)     Physical Exam   Constitutional: She is oriented to person, place, and time. Vital signs are normal. She appears well-developed and well-nourished. No distress.   Obese - 11# gain since 10/2018 visit   HENT:   Head: Normocephalic and atraumatic.   Right Ear: Hearing normal.   Left Ear: Hearing normal.   Nose: Nose normal. No rhinorrhea.   Mouth/Throat: Mucous membranes are normal.   Eyes: Pupils are equal, round, and reactive to light. Conjunctivae and lids are normal. Right eye exhibits no discharge. " Left eye exhibits no discharge. Right conjunctiva is not injected. Left conjunctiva is not injected. Right pupil is round and reactive. Left pupil is round and reactive. Pupils are equal.   Neck: Trachea normal and normal range of motion. Neck supple. No JVD present. No tracheal deviation present. No thyromegaly present.   Cardiovascular: Normal rate, regular rhythm, normal heart sounds and intact distal pulses. Exam reveals no gallop and no friction rub.   No murmur heard.  Pulses:       Radial pulses are 2+ on the right side, and 2+ on the left side.   Pulmonary/Chest: Effort normal and breath sounds normal. No stridor. No respiratory distress. She has no decreased breath sounds. She has no wheezes. She has no rhonchi. She has no rales.   Abdominal: Soft. Bowel sounds are normal. She exhibits no distension. There is no tenderness. There is no rigidity and no guarding.   Musculoskeletal: Normal range of motion. She exhibits no edema.   Lymphadenopathy:     She has no cervical adenopathy.   Neurological: She is alert and oriented to person, place, and time. She has normal strength. She displays no atrophy. She displays a negative Romberg sign. Coordination and gait normal.   Skin: Skin is warm and dry. Capillary refill takes less than 2 seconds. No lesion and no rash noted. No cyanosis. No pallor.   Psychiatric: She has a normal mood and affect. Her speech is normal and behavior is normal. Judgment and thought content normal. Cognition and memory are normal. She is attentive.   Nursing note and vitals reviewed.     Assessment:       1. Anxiety    2. Essential hypertension    3. Pure hypercholesterolemia        Plan:       Anxiety  -     LORazepam (ATIVAN) 0.5 MG tablet; Take 1 tablet (0.5 mg total) by mouth daily as needed for Anxiety.  Dispense: 30 tablet; Refill: 2    Essential hypertension  -     amLODIPine-valsartan-hcthiazid -12.5 mg Tab; Take 1 tablet by mouth once daily.  Dispense: 90 tablet; Refill:  3  -     CBC auto differential; Future; Expected date: 01/29/2020  -     Comprehensive metabolic panel; Future; Expected date: 01/22/2020    Pure hypercholesterolemia        - wants to attempt diet/lifestyle changes at present before starting medication        Follow up in about 1 year (around 1/22/2021) for anxiety refills.      1/22/2020 ALIRIO Randall, FNP-C

## 2020-11-02 ENCOUNTER — OFFICE VISIT (OUTPATIENT)
Dept: URGENT CARE | Facility: CLINIC | Age: 50
End: 2020-11-02
Payer: COMMERCIAL

## 2020-11-02 VITALS
OXYGEN SATURATION: 98 % | DIASTOLIC BLOOD PRESSURE: 81 MMHG | SYSTOLIC BLOOD PRESSURE: 133 MMHG | HEART RATE: 78 BPM | RESPIRATION RATE: 16 BRPM | TEMPERATURE: 99 F

## 2020-11-02 DIAGNOSIS — U07.1 COVID-19: Primary | ICD-10-CM

## 2020-11-02 DIAGNOSIS — U07.1 COVID-19 VIRUS DETECTED: ICD-10-CM

## 2020-11-02 DIAGNOSIS — Z20.828 CONTACT WITH OR EXPOSURE TO VIRAL DISEASE: ICD-10-CM

## 2020-11-02 LAB
CTP QC/QA: YES
SARS-COV-2 RDRP RESP QL NAA+PROBE: POSITIVE

## 2020-11-02 PROCEDURE — U0002 COVID-19 LAB TEST NON-CDC: HCPCS | Mod: QW,S$GLB,, | Performed by: NURSE PRACTITIONER

## 2020-11-02 PROCEDURE — 99214 PR OFFICE/OUTPT VISIT, EST, LEVL IV, 30-39 MIN: ICD-10-PCS | Mod: CS,S$GLB,, | Performed by: NURSE PRACTITIONER

## 2020-11-02 PROCEDURE — 99214 OFFICE O/P EST MOD 30 MIN: CPT | Mod: CS,S$GLB,, | Performed by: NURSE PRACTITIONER

## 2020-11-02 PROCEDURE — U0002: ICD-10-PCS | Mod: QW,S$GLB,, | Performed by: NURSE PRACTITIONER

## 2020-11-02 NOTE — PROGRESS NOTES
Subjective:       Patient ID: Jalyn Dickson is a 50 y.o. female.    Vitals:  vitals were not taken for this visit.     Chief Complaint: No chief complaint on file.    Presents with cough and requesting rapid COVID testing.    had a surgical procedure last week, developed a fever. Went to ER today, told he is covid positive. Patient states she has had a cough since this weekend but denies other complaint    Cough  This is a new problem. The current episode started in the past 7 days. The problem has been unchanged. The problem occurs every few minutes. The cough is non-productive. Pertinent negatives include no chills, ear pain, eye redness, fever, headaches, myalgias, rash or sore throat. Nothing aggravates the symptoms. She has tried nothing for the symptoms.       Constitution: Negative for appetite change, chills and fever.   HENT: Negative for ear pain, congestion and sore throat.    Neck: Negative for painful lymph nodes.   Eyes: Negative for eye discharge and eye redness.   Respiratory: Positive for cough.    Gastrointestinal: Negative for vomiting and diarrhea.   Genitourinary: Negative for dysuria.   Musculoskeletal: Negative for muscle ache.   Skin: Negative for rash and erythema.   Neurological: Negative for headaches and seizures.   Hematologic/Lymphatic: Negative for swollen lymph nodes.       Objective:      Physical Exam   Constitutional: She is oriented to person, place, and time. She appears well-developed.   HENT:   Head: Normocephalic and atraumatic. Head is without abrasion, without contusion and without laceration.   Ears:   Right Ear: External ear normal.   Left Ear: External ear normal.   Nose: Nose normal.   Mouth/Throat: Oropharynx is clear and moist and mucous membranes are normal.   Eyes: Pupils are equal, round, and reactive to light. Conjunctivae, EOM and lids are normal.   Neck: Trachea normal, full passive range of motion without pain and phonation normal. Neck supple.    Cardiovascular: Normal rate, regular rhythm and normal heart sounds.   Pulmonary/Chest: Effort normal and breath sounds normal. No stridor. No respiratory distress.   Musculoskeletal: Normal range of motion.   Neurological: She is alert and oriented to person, place, and time.   Skin: Skin is warm, dry, intact and no rash. Capillary refill takes less than 2 seconds. abrasion, burn, bruising, erythema and ecchymosisPsychiatric: Her speech is normal and behavior is normal. Judgment and thought content normal.   Nursing note and vitals reviewed.        Assessment:       1. Contact with or exposure to viral disease        Plan:     COVID 19 rapid test performed, positive  Patient given instructions for quarantine, discharged to home    Contact with or exposure to viral disease

## 2020-11-02 NOTE — PATIENT INSTRUCTIONS
Instructions for Patients with Confirmed or Suspected COVID-19    If you are awaiting your test result, you will either be called or it will be released to the patient portal.  If you have any questions about your test, please visit [email or web address] or call our COVID-19 information line at [phone].      Instructions for non-hospitalized or discharged patients with confirmed or suspected COVID-19:       Stay home except to get medical care.    Separate yourself from other people and animals in your home.    Call ahead before visiting your doctor.    Wear a face mask.    Cover your coughs and sneezes.    Clean your hands often.    Avoid sharing personal household items.    Clean all high-touch surfaces every day.    Monitor your symptoms. Seek prompt medical attention if your illness is worsening (e.g., difficulty breathing). Before seeking care, call your healthcare provider.    If you have a medical emergency and must call 911, notify the dispatcher that you have or are being evaluated for COVID-19. If possible, put on a face mask before emergency medical services arrive.    Use the following symptom-based strategy to return to normal activity following a suspected or confirmed case of COVID-19. Continue isolation until:   o At least 3 days (72 hours) have passed since recovery defined as resolution of fever without the use of fever-reducing medications and improvement in respiratory symptoms (e.g. cough, shortness of breath), and   o At least 10 days have passed since the first positive test.       As one of the next steps, you will receive a call or text from the [place] Department of Health ([facility]) COVID-19 Tracing Team. See the contact information below so you know not to ignore the health departments call. It is important that you contact them back immediately so they can help.     Contact Tracer Number:  [phone]  Caller ID for most carriers: [place] Dept [facility]    What is  contact tracing?   Contact tracing is a process that helps identify everyone who has been in close contact with an infected person. Contact tracers let those people know they may have been exposed and guide them on next steps. Confidentiality is important for everyone; no one will be told who may have exposed them to the virus.   Your involvement is important. The more we know about where and how this virus is spreading, the better chance we have at stopping it from spreading further.  What does exposure mean?   Exposure means you have been within 6 feet for more than 15 minutes with a person who has or had COVID-19.  What kind of questions do the contact tracers ask?   A contact tracer will confirm your basic contact information including name, address, phone number, and next of kin, as well as asking about any symptoms you may have had. Theyll also ask you how you think you may have gotten sick, such as places where you may have been exposed to the virus, and people you were with. Those names will never be shared with anyone outside of that call, and will only be used to help trace and stop the spread of the virus.   I have privacy concerns. How will the state use my information?   Your privacy about your health is important. All calls are completed using call centers that use the appropriate health privacy protection measures (HIPAA compliance), meaning that your patient information is safe. No one will ever ask you any questions related to immigration status. Your health comes first.   Do I have to participate?   You do not have to participate, but we strongly encourage you to. Contact tracing can help us catch and control new outbreaks as theyre developing to keep your friends and family safe.   What if I dont hear from anyone?   If you dont receive a call within 24 hours, you can call the number above right away to inquire about your status. That line is open from 8:00 am - 8:00 p.m., 7 days a  week.  Contact tracing saves lives! Together, we have the power to beat this virus and keep our loved ones and neighbors safe.       Instructions for household members, intimate partners and caregivers in a non-healthcare setting of a patient with confirmed or suspected COVID-19:         Close contacts should monitor their health and call their healthcare provider right away if they develop symptoms suggestive of COVID-19 (e.g., fever, cough, shortness of breath).    Stay home except to get medical care. Separate yourself from other people and animals in the home.   Monitor the patients symptoms. If the patient is getting sicker, call his or her healthcare provider. If the patient has a medical emergency and you need to call 911, notify the dispatch personnel that the patient has or is being evaluated for COVID-19.    Wear a facemask when around other people such as sharing a room or vehicle and before entering a healthcare provider's office.   Cover coughs and sneezes with a tissue. Throw used tissues in a lined trash can immediately and wash hands.   Clean hands often with soap and water for at least 20 seconds or with an alcohol-based hand , rubbing hands together until they feel dry. Avoid touching your eyes, nose, and mouth with unwashed hands.   Clean all high-touch; surfaces every day, including counters, tabletops, doorknobs, bathroom fixtures, toilets, phones, keyboards, tablets, bedside tables, etc. Use a household cleaning spray or wipe according to label instructions.   Avoid sharing personal household items such as dishes, drinking glasses, cups, towels, bedding, etc. After these items are used, they should be washed thoroughly with soap and water.   Continue isolation until:   At least 3 days (72 hours) have passed since recovery defined as resolution of fever without the use of fever-reducing medications and improvement in respiratory symptoms (e.g. cough, shortness of breath),  and    At least 10 days have passed since the patients first positive test.    https://www.cdc.gov/coronavirus/2019-ncov/your-health/index.htm

## 2020-11-06 ENCOUNTER — HOSPITAL ENCOUNTER (INPATIENT)
Facility: HOSPITAL | Age: 50
LOS: 3 days | Discharge: HOME | DRG: 177 | End: 2020-11-09
Attending: EMERGENCY MEDICINE | Admitting: HOSPITALIST
Payer: COMMERCIAL

## 2020-11-06 DIAGNOSIS — R05.9 COUGH: ICD-10-CM

## 2020-11-06 DIAGNOSIS — R09.02 HYPOXIA: ICD-10-CM

## 2020-11-06 DIAGNOSIS — U07.1 COVID-19 VIRUS INFECTION: ICD-10-CM

## 2020-11-06 DIAGNOSIS — J12.82 PNEUMONIA DUE TO COVID-19 VIRUS: Primary | ICD-10-CM

## 2020-11-06 DIAGNOSIS — R07.9 CHEST PAIN: ICD-10-CM

## 2020-11-06 DIAGNOSIS — U07.1 PNEUMONIA DUE TO COVID-19 VIRUS: Primary | ICD-10-CM

## 2020-11-06 DIAGNOSIS — J96.01 ACUTE HYPOXEMIC RESPIRATORY FAILURE: ICD-10-CM

## 2020-11-06 DIAGNOSIS — R06.02 SOB (SHORTNESS OF BREATH): ICD-10-CM

## 2020-11-06 LAB
ABO + RH BLD: NORMAL
ALBUMIN SERPL BCP-MCNC: 3.9 G/DL (ref 3.5–5.2)
ALP SERPL-CCNC: 59 U/L (ref 55–135)
ALT SERPL W/O P-5'-P-CCNC: 25 U/L (ref 10–44)
ANION GAP SERPL CALC-SCNC: 13 MMOL/L (ref 8–16)
AST SERPL-CCNC: 27 U/L (ref 10–40)
BASOPHILS # BLD AUTO: 0.01 K/UL (ref 0–0.2)
BASOPHILS NFR BLD: 0.2 % (ref 0–1.9)
BILIRUB SERPL-MCNC: 0.9 MG/DL (ref 0.1–1)
BLD GP AB SCN CELLS X3 SERPL QL: NORMAL
BNP SERPL-MCNC: 17 PG/ML (ref 0–99)
BUN SERPL-MCNC: 7 MG/DL (ref 6–20)
CALCIUM SERPL-MCNC: 8.9 MG/DL (ref 8.7–10.5)
CHLORIDE SERPL-SCNC: 99 MMOL/L (ref 95–110)
CK SERPL-CCNC: 49 U/L (ref 20–180)
CO2 SERPL-SCNC: 25 MMOL/L (ref 23–29)
CREAT SERPL-MCNC: 0.6 MG/DL (ref 0.5–1.4)
CRP SERPL-MCNC: 2.2 MG/DL
D DIMER PPP IA.FEU-MCNC: 0.48 UG/ML FEU
DIFFERENTIAL METHOD: ABNORMAL
EOSINOPHIL # BLD AUTO: 0 K/UL (ref 0–0.5)
EOSINOPHIL NFR BLD: 0.6 % (ref 0–8)
ERYTHROCYTE [DISTWIDTH] IN BLOOD BY AUTOMATED COUNT: 14.6 % (ref 11.5–14.5)
EST. GFR  (AFRICAN AMERICAN): >60 ML/MIN/1.73 M^2
EST. GFR  (NON AFRICAN AMERICAN): >60 ML/MIN/1.73 M^2
FERRITIN SERPL-MCNC: 28 NG/ML (ref 20–300)
GLUCOSE SERPL-MCNC: 97 MG/DL (ref 70–110)
HCT VFR BLD AUTO: 38.3 % (ref 37–48.5)
HGB BLD-MCNC: 12.1 G/DL (ref 12–16)
IMM GRANULOCYTES # BLD AUTO: 0.02 K/UL (ref 0–0.04)
IMM GRANULOCYTES NFR BLD AUTO: 0.4 % (ref 0–0.5)
LACTATE SERPL-SCNC: 1 MMOL/L (ref 0.5–1.9)
LDH SERPL L TO P-CCNC: 155 U/L (ref 110–260)
LYMPHOCYTES # BLD AUTO: 1.3 K/UL (ref 1–4.8)
LYMPHOCYTES NFR BLD: 24.2 % (ref 18–48)
MCH RBC QN AUTO: 26.2 PG (ref 27–31)
MCHC RBC AUTO-ENTMCNC: 31.6 G/DL (ref 32–36)
MCV RBC AUTO: 83 FL (ref 82–98)
MONOCYTES # BLD AUTO: 0.7 K/UL (ref 0.3–1)
MONOCYTES NFR BLD: 12.1 % (ref 4–15)
NEUTROPHILS # BLD AUTO: 3.4 K/UL (ref 1.8–7.7)
NEUTROPHILS NFR BLD: 62.5 % (ref 38–73)
NRBC BLD-RTO: 0 /100 WBC
PLATELET # BLD AUTO: 343 K/UL (ref 150–350)
PMV BLD AUTO: 10.5 FL (ref 9.2–12.9)
POTASSIUM SERPL-SCNC: 3.7 MMOL/L (ref 3.5–5.1)
PROCALCITONIN SERPL IA-MCNC: <0.05 NG/ML (ref 0–0.5)
PROT SERPL-MCNC: 7.5 G/DL (ref 6–8.4)
RBC # BLD AUTO: 4.62 M/UL (ref 4–5.4)
SODIUM SERPL-SCNC: 137 MMOL/L (ref 136–145)
TROPONIN I SERPL DL<=0.01 NG/ML-MCNC: <0.03 NG/ML
WBC # BLD AUTO: 5.37 K/UL (ref 3.9–12.7)

## 2020-11-06 PROCEDURE — 25000003 PHARM REV CODE 250: Performed by: NURSE PRACTITIONER

## 2020-11-06 PROCEDURE — 93010 ELECTROCARDIOGRAM REPORT: CPT | Mod: ,,, | Performed by: INTERNAL MEDICINE

## 2020-11-06 PROCEDURE — 84484 ASSAY OF TROPONIN QUANT: CPT

## 2020-11-06 PROCEDURE — 83615 LACTATE (LD) (LDH) ENZYME: CPT

## 2020-11-06 PROCEDURE — 99285 EMERGENCY DEPT VISIT HI MDM: CPT | Mod: 25

## 2020-11-06 PROCEDURE — 63600175 PHARM REV CODE 636 W HCPCS: Performed by: HOSPITALIST

## 2020-11-06 PROCEDURE — 82728 ASSAY OF FERRITIN: CPT

## 2020-11-06 PROCEDURE — 86140 C-REACTIVE PROTEIN: CPT

## 2020-11-06 PROCEDURE — 82550 ASSAY OF CK (CPK): CPT

## 2020-11-06 PROCEDURE — 25000003 PHARM REV CODE 250: Performed by: HOSPITALIST

## 2020-11-06 PROCEDURE — 86901 BLOOD TYPING SEROLOGIC RH(D): CPT

## 2020-11-06 PROCEDURE — 21400001 HC TELEMETRY ROOM

## 2020-11-06 PROCEDURE — 85025 COMPLETE CBC W/AUTO DIFF WBC: CPT

## 2020-11-06 PROCEDURE — 93005 ELECTROCARDIOGRAM TRACING: CPT | Performed by: INTERNAL MEDICINE

## 2020-11-06 PROCEDURE — 12000002 HC ACUTE/MED SURGE SEMI-PRIVATE ROOM

## 2020-11-06 PROCEDURE — 94761 N-INVAS EAR/PLS OXIMETRY MLT: CPT

## 2020-11-06 PROCEDURE — 83605 ASSAY OF LACTIC ACID: CPT

## 2020-11-06 PROCEDURE — 27000221 HC OXYGEN, UP TO 24 HOURS

## 2020-11-06 PROCEDURE — 99900035 HC TECH TIME PER 15 MIN (STAT)

## 2020-11-06 PROCEDURE — 85379 FIBRIN DEGRADATION QUANT: CPT

## 2020-11-06 PROCEDURE — 84145 PROCALCITONIN (PCT): CPT

## 2020-11-06 PROCEDURE — 83880 ASSAY OF NATRIURETIC PEPTIDE: CPT

## 2020-11-06 PROCEDURE — 36415 COLL VENOUS BLD VENIPUNCTURE: CPT

## 2020-11-06 PROCEDURE — 63600175 PHARM REV CODE 636 W HCPCS: Performed by: EMERGENCY MEDICINE

## 2020-11-06 PROCEDURE — 87040 BLOOD CULTURE FOR BACTERIA: CPT | Mod: 59

## 2020-11-06 PROCEDURE — 80053 COMPREHEN METABOLIC PANEL: CPT

## 2020-11-06 PROCEDURE — 93010 EKG 12-LEAD: ICD-10-PCS | Mod: ,,, | Performed by: INTERNAL MEDICINE

## 2020-11-06 RX ORDER — GUAIFENESIN 600 MG/1
1200 TABLET, EXTENDED RELEASE ORAL 2 TIMES DAILY
Status: DISCONTINUED | OUTPATIENT
Start: 2020-11-06 | End: 2020-11-09 | Stop reason: HOSPADM

## 2020-11-06 RX ORDER — LANOLIN ALCOHOL/MO/W.PET/CERES
800 CREAM (GRAM) TOPICAL
Status: DISCONTINUED | OUTPATIENT
Start: 2020-11-06 | End: 2020-11-09 | Stop reason: HOSPADM

## 2020-11-06 RX ORDER — FAMOTIDINE 20 MG/1
20 TABLET, FILM COATED ORAL 2 TIMES DAILY
Status: DISCONTINUED | OUTPATIENT
Start: 2020-11-06 | End: 2020-11-09 | Stop reason: HOSPADM

## 2020-11-06 RX ORDER — PROMETHAZINE HYDROCHLORIDE AND CODEINE PHOSPHATE 6.25; 1 MG/5ML; MG/5ML
5 SOLUTION ORAL EVERY 6 HOURS PRN
COMMUNITY
End: 2020-11-19

## 2020-11-06 RX ORDER — MAGNESIUM SULFATE HEPTAHYDRATE 40 MG/ML
4 INJECTION, SOLUTION INTRAVENOUS
Status: DISCONTINUED | OUTPATIENT
Start: 2020-11-06 | End: 2020-11-09 | Stop reason: HOSPADM

## 2020-11-06 RX ORDER — POTASSIUM CHLORIDE 20 MEQ/1
20 TABLET, EXTENDED RELEASE ORAL
Status: DISCONTINUED | OUTPATIENT
Start: 2020-11-06 | End: 2020-11-09 | Stop reason: HOSPADM

## 2020-11-06 RX ORDER — SODIUM CHLORIDE 0.9 % (FLUSH) 0.9 %
10 SYRINGE (ML) INJECTION
Status: DISCONTINUED | OUTPATIENT
Start: 2020-11-06 | End: 2020-11-09 | Stop reason: HOSPADM

## 2020-11-06 RX ORDER — TALC
9 POWDER (GRAM) TOPICAL NIGHTLY
Status: DISCONTINUED | OUTPATIENT
Start: 2020-11-06 | End: 2020-11-09 | Stop reason: HOSPADM

## 2020-11-06 RX ORDER — CALCIUM CHLORIDE IN 0.9 % NACL 1 G/100 ML
1 INTRAVENOUS SOLUTION, PIGGYBACK (ML) INTRAVENOUS
Status: DISCONTINUED | OUTPATIENT
Start: 2020-11-06 | End: 2020-11-09 | Stop reason: HOSPADM

## 2020-11-06 RX ORDER — MAGNESIUM SULFATE 1 G/100ML
1 INJECTION INTRAVENOUS
Status: DISCONTINUED | OUTPATIENT
Start: 2020-11-06 | End: 2020-11-09 | Stop reason: HOSPADM

## 2020-11-06 RX ORDER — HYDROCHLOROTHIAZIDE 12.5 MG/1
CAPSULE ORAL
Status: ON HOLD | COMMUNITY
End: 2020-11-09 | Stop reason: HOSPADM

## 2020-11-06 RX ORDER — MORPHINE SULFATE 2 MG/ML
2 INJECTION, SOLUTION INTRAMUSCULAR; INTRAVENOUS EVERY 4 HOURS PRN
Status: DISCONTINUED | OUTPATIENT
Start: 2020-11-06 | End: 2020-11-09 | Stop reason: HOSPADM

## 2020-11-06 RX ORDER — AMLODIPINE BESYLATE 10 MG/1
TABLET ORAL
Status: ON HOLD | COMMUNITY
End: 2020-11-09 | Stop reason: HOSPADM

## 2020-11-06 RX ORDER — ALBUTEROL SULFATE 90 UG/1
2 AEROSOL, METERED RESPIRATORY (INHALATION) EVERY 8 HOURS
Status: DISCONTINUED | OUTPATIENT
Start: 2020-11-07 | End: 2020-11-06

## 2020-11-06 RX ORDER — FERROUS SULFATE 325(65) MG
325 TABLET ORAL DAILY
COMMUNITY
End: 2020-11-19

## 2020-11-06 RX ORDER — POTASSIUM CHLORIDE 20 MEQ/1
40 TABLET, EXTENDED RELEASE ORAL
Status: DISCONTINUED | OUTPATIENT
Start: 2020-11-06 | End: 2020-11-09 | Stop reason: HOSPADM

## 2020-11-06 RX ORDER — ENOXAPARIN SODIUM 100 MG/ML
40 INJECTION SUBCUTANEOUS EVERY 24 HOURS
Status: DISCONTINUED | OUTPATIENT
Start: 2020-11-06 | End: 2020-11-09 | Stop reason: HOSPADM

## 2020-11-06 RX ORDER — MAGNESIUM SULFATE HEPTAHYDRATE 40 MG/ML
2 INJECTION, SOLUTION INTRAVENOUS
Status: DISCONTINUED | OUTPATIENT
Start: 2020-11-06 | End: 2020-11-09 | Stop reason: HOSPADM

## 2020-11-06 RX ORDER — AMLODIPINE BESYLATE 5 MG/1
TABLET ORAL
Status: ON HOLD | COMMUNITY
End: 2020-11-09 | Stop reason: HOSPADM

## 2020-11-06 RX ORDER — POTASSIUM CHLORIDE 7.45 MG/ML
20 INJECTION INTRAVENOUS
Status: DISCONTINUED | OUTPATIENT
Start: 2020-11-06 | End: 2020-11-09 | Stop reason: HOSPADM

## 2020-11-06 RX ORDER — VALSARTAN 80 MG/1
160 TABLET ORAL DAILY
Status: DISCONTINUED | OUTPATIENT
Start: 2020-11-07 | End: 2020-11-09 | Stop reason: HOSPADM

## 2020-11-06 RX ORDER — MOMETASONE FUROATE 50 UG/1
SPRAY, METERED NASAL
COMMUNITY
End: 2021-01-25

## 2020-11-06 RX ORDER — DEXAMETHASONE SODIUM PHOSPHATE 4 MG/ML
8 INJECTION, SOLUTION INTRA-ARTICULAR; INTRALESIONAL; INTRAMUSCULAR; INTRAVENOUS; SOFT TISSUE
Status: COMPLETED | OUTPATIENT
Start: 2020-11-06 | End: 2020-11-06

## 2020-11-06 RX ORDER — ONDANSETRON 2 MG/ML
4 INJECTION INTRAMUSCULAR; INTRAVENOUS EVERY 8 HOURS PRN
Status: DISCONTINUED | OUTPATIENT
Start: 2020-11-06 | End: 2020-11-09 | Stop reason: HOSPADM

## 2020-11-06 RX ORDER — PROMETHAZINE HYDROCHLORIDE AND CODEINE PHOSPHATE 6.25; 1 MG/5ML; MG/5ML
5 SOLUTION ORAL EVERY 6 HOURS PRN
Status: DISCONTINUED | OUTPATIENT
Start: 2020-11-06 | End: 2020-11-09 | Stop reason: HOSPADM

## 2020-11-06 RX ORDER — GUAIFENESIN 600 MG/1
1200 TABLET, EXTENDED RELEASE ORAL 2 TIMES DAILY
COMMUNITY
End: 2020-11-19

## 2020-11-06 RX ORDER — AMLODIPINE BESYLATE 5 MG/1
10 TABLET ORAL DAILY
Status: DISCONTINUED | OUTPATIENT
Start: 2020-11-07 | End: 2020-11-09 | Stop reason: HOSPADM

## 2020-11-06 RX ORDER — VALSARTAN 80 MG/1
TABLET ORAL
Status: ON HOLD | COMMUNITY
End: 2020-11-09 | Stop reason: HOSPADM

## 2020-11-06 RX ORDER — VALACYCLOVIR HYDROCHLORIDE 500 MG/1
500 TABLET, FILM COATED ORAL DAILY
Status: DISCONTINUED | OUTPATIENT
Start: 2020-11-07 | End: 2020-11-09 | Stop reason: HOSPADM

## 2020-11-06 RX ORDER — ALBUTEROL SULFATE 90 UG/1
1 AEROSOL, METERED RESPIRATORY (INHALATION) EVERY 4 HOURS PRN
COMMUNITY
End: 2021-01-25

## 2020-11-06 RX ORDER — CHOLECALCIFEROL (VITAMIN D3) 50 MCG
2000 TABLET ORAL DAILY
Status: DISCONTINUED | OUTPATIENT
Start: 2020-11-07 | End: 2020-11-09 | Stop reason: HOSPADM

## 2020-11-06 RX ORDER — AMOXICILLIN 250 MG
1 CAPSULE ORAL 2 TIMES DAILY
Status: DISCONTINUED | OUTPATIENT
Start: 2020-11-06 | End: 2020-11-09 | Stop reason: HOSPADM

## 2020-11-06 RX ORDER — ACETAMINOPHEN 500 MG
1000 TABLET ORAL
Status: COMPLETED | OUTPATIENT
Start: 2020-11-06 | End: 2020-11-06

## 2020-11-06 RX ORDER — IBUPROFEN 400 MG/1
400 TABLET ORAL EVERY 6 HOURS PRN
Status: DISCONTINUED | OUTPATIENT
Start: 2020-11-06 | End: 2020-11-09 | Stop reason: HOSPADM

## 2020-11-06 RX ORDER — ALBUTEROL SULFATE 90 UG/1
2 AEROSOL, METERED RESPIRATORY (INHALATION)
Status: DISCONTINUED | OUTPATIENT
Start: 2020-11-07 | End: 2020-11-09

## 2020-11-06 RX ORDER — INFLUENZA A VIRUS A/VICTORIA/2454/2019 IVR-207 (H1N1) ANTIGEN (PROPIOLACTONE INACTIVATED), INFLUENZA A VIRUS A/HONG KONG/2671/2019 IVR-208 (H3N2) ANTIGEN (PROPIOLACTONE INACTIVATED), INFLUENZA B VIRUS B/VICTORIA/705/2018 BVR-11 ANTIGEN (PROPIOLACTONE INACTIVATED), INFLUENZA B VIRUS B/PHUKET/3073/2013 BVR-1B ANTIGEN (PROPIOLACTONE INACTIVATED) 15; 15; 15; 15 UG/.5ML; UG/.5ML; UG/.5ML; UG/.5ML
INJECTION, SUSPENSION INTRAMUSCULAR
COMMUNITY
Start: 2020-10-26 | End: 2020-11-19

## 2020-11-06 RX ORDER — NAPROXEN SODIUM 220 MG/1
81 TABLET, FILM COATED ORAL DAILY
Status: DISCONTINUED | OUTPATIENT
Start: 2020-11-07 | End: 2020-11-09 | Stop reason: HOSPADM

## 2020-11-06 RX ORDER — POTASSIUM CHLORIDE 7.45 MG/ML
40 INJECTION INTRAVENOUS
Status: DISCONTINUED | OUTPATIENT
Start: 2020-11-06 | End: 2020-11-09 | Stop reason: HOSPADM

## 2020-11-06 RX ORDER — AMLODIPINE, VALSARTAN AND HYDROCHLOROTHIAZIDE 10; 160; 12.5 MG/1; MG/1; MG/1
1 TABLET ORAL DAILY
Status: DISCONTINUED | OUTPATIENT
Start: 2020-11-07 | End: 2020-11-06

## 2020-11-06 RX ORDER — LORAZEPAM 0.5 MG/1
0.5 TABLET ORAL DAILY PRN
Status: DISCONTINUED | OUTPATIENT
Start: 2020-11-06 | End: 2020-11-09 | Stop reason: HOSPADM

## 2020-11-06 RX ORDER — LOSARTAN POTASSIUM 50 MG/1
TABLET ORAL
Status: ON HOLD | COMMUNITY
End: 2020-11-09 | Stop reason: HOSPADM

## 2020-11-06 RX ORDER — HYDROCHLOROTHIAZIDE 12.5 MG/1
12.5 TABLET ORAL DAILY
Status: DISCONTINUED | OUTPATIENT
Start: 2020-11-07 | End: 2020-11-09 | Stop reason: HOSPADM

## 2020-11-06 RX ORDER — HYDROCODONE BITARTRATE AND ACETAMINOPHEN 5; 325 MG/1; MG/1
1 TABLET ORAL EVERY 6 HOURS PRN
Status: DISCONTINUED | OUTPATIENT
Start: 2020-11-06 | End: 2020-11-09 | Stop reason: HOSPADM

## 2020-11-06 RX ORDER — POLYETHYLENE GLYCOL 3350 17 G/17G
17 POWDER, FOR SOLUTION ORAL DAILY
Status: DISCONTINUED | OUTPATIENT
Start: 2020-11-07 | End: 2020-11-09 | Stop reason: HOSPADM

## 2020-11-06 RX ORDER — ACETAMINOPHEN 325 MG/1
650 TABLET ORAL EVERY 4 HOURS PRN
Status: DISCONTINUED | OUTPATIENT
Start: 2020-11-06 | End: 2020-11-09 | Stop reason: HOSPADM

## 2020-11-06 RX ORDER — ASCORBIC ACID 500 MG
1000 TABLET ORAL DAILY
Status: DISCONTINUED | OUTPATIENT
Start: 2020-11-07 | End: 2020-11-09 | Stop reason: HOSPADM

## 2020-11-06 RX ADMIN — GUAIFENESIN 1200 MG: 600 TABLET, EXTENDED RELEASE ORAL at 08:11

## 2020-11-06 RX ADMIN — SENNOSIDES AND DOCUSATE SODIUM 1 TABLET: 8.6; 5 TABLET ORAL at 08:11

## 2020-11-06 RX ADMIN — ENOXAPARIN SODIUM 40 MG: 40 INJECTION SUBCUTANEOUS at 05:11

## 2020-11-06 RX ADMIN — ACETAMINOPHEN 1000 MG: 500 TABLET, FILM COATED ORAL at 01:11

## 2020-11-06 RX ADMIN — MELATONIN 9 MG: at 08:11

## 2020-11-06 RX ADMIN — REMDESIVIR 200 MG: 100 INJECTION, POWDER, LYOPHILIZED, FOR SOLUTION INTRAVENOUS at 08:11

## 2020-11-06 RX ADMIN — DEXAMETHASONE SODIUM PHOSPHATE 8 MG: 4 INJECTION, SOLUTION INTRA-ARTICULAR; INTRALESIONAL; INTRAMUSCULAR; INTRAVENOUS; SOFT TISSUE at 04:11

## 2020-11-06 RX ADMIN — AZITHROMYCIN MONOHYDRATE 500 MG: 500 INJECTION, POWDER, LYOPHILIZED, FOR SOLUTION INTRAVENOUS at 04:11

## 2020-11-06 RX ADMIN — CEFTRIAXONE 1 G: 1 INJECTION, SOLUTION INTRAVENOUS at 05:11

## 2020-11-06 RX ADMIN — FAMOTIDINE 20 MG: 20 TABLET, FILM COATED ORAL at 08:11

## 2020-11-06 NOTE — HPI
50-year-old lady with past medical history of hypertension, pulmonary embolism in the past requiring anticoagulation came with chief complaint of shortness of breath and fever.  Upon further asking patient mentioned that she was in her usual state of health until about a week ago when she started experiencing dry cough, severe myalgia and her  was diagnosed with COVID-19 who is currently admitted in [facility].  Progressively her symptoms got worse and she was diagnosed with COVID-19 on November 2nd at urgent care.  She was monitoring her oxygen saturation today she noticed her SpO2 of 90% and she also started experiencing high-grade fever and decided to come to ED.  As per ED provider upon arrival her oxygen saturation was 81% on room air that quickly improved with 3 L oxygen via nasal cannula.  Otherwise she also reports fever, chills, severe myalgia, shortness of breath, cough and loss of taste.  Denies any headache, dizziness, chest pain, palpitations, nausea, vomiting, bladder or bowel symptoms.

## 2020-11-06 NOTE — SUBJECTIVE & OBJECTIVE
Past Medical History:   Diagnosis Date    Acid reflux     Allergy     Anxiety     Hypertension     Neck mass     Pulmonary embolism     Sinus drainage        Past Surgical History:   Procedure Laterality Date    NECK LESION BIOPSY      neck mass removal      TUBAL LIGATION  1999       Review of patient's allergies indicates:  No Known Allergies    No current facility-administered medications on file prior to encounter.      Current Outpatient Medications on File Prior to Encounter   Medication Sig    albuterol (PROAIR HFA) 90 mcg/actuation inhaler Inhale 1 puff into the lungs every 4 (four) hours as needed.     amLODIPine-valsartan-hcthiazid -12.5 mg Tab Take 1 tablet by mouth once daily.    ascorbic acid, vitamin C, (VITAMIN C) 1000 MG tablet Take 1,000 mg by mouth once daily.    aspirin 81 MG Chew Take 81 mg by mouth once daily.    b complex vitamins tablet Take 1 tablet by mouth once daily.    cyanocobalamin (VITAMIN B-12) 1000 MCG tablet Take 100 mcg by mouth once daily.    guaiFENesin (MUCINEX) 600 mg 12 hr tablet Take 1,200 mg by mouth 2 (two) times daily.    levocetirizine (XYZAL) 5 MG tablet Take 5 mg by mouth every evening.    LORazepam (ATIVAN) 0.5 MG tablet Take 1 tablet (0.5 mg total) by mouth daily as needed for Anxiety.    omeprazole (PRILOSEC) 20 MG capsule Take 20 mg by mouth once daily.    promethazine-codeine 6.25-10 mg/5 ml (PHENERGAN WITH CODEINE) 6.25-10 mg/5 mL syrup Take 5 mLs by mouth every 6 (six) hours as needed.     valACYclovir (VALTREX) 500 MG tablet Take 500 mg by mouth once daily.     vitamin E 400 UNIT capsule Take 400 Units by mouth once daily.    AFLURIA QD 2020-21,3YR UP,,PF, 60 mcg (15 mcg x 4)/0.5 mL Syrg ADM 0.5ML IM UTD    amLODIPine (NORVASC) 10 MG tablet amlodipine 10 mg tablet    amLODIPine (NORVASC) 5 MG tablet amlodipine 5 mg tablet    ferrous sulfate (FEOSOL) 325 mg (65 mg iron) Tab tablet Take 325 mg by mouth once daily.     GENERLAC 10  gram/15 mL solution Take 10 g by mouth 2 (two) times daily.     hydroCHLOROthiazide (MICROZIDE) 12.5 mg capsule hydrochlorothiazide 12.5 mg capsule    losartan (COZAAR) 50 MG tablet losartan 50 mg tablet    mometasone (NASONEX) 50 mcg/actuation nasal spray Nasonex 50 mcg/actuation Spray    valsartan (DIOVAN) 80 MG tablet Diovan 80 mg tablet     Family History     Problem Relation (Age of Onset)    Coronary artery disease Father    Diabetes Father    Heart disease Mother    Hypertension Father, Maternal Grandmother, Paternal Grandfather    No Known Problems Maternal Grandfather, Son    PDD Paternal Grandfather    Peripheral vascular disease Father        Tobacco Use    Smoking status: Never Smoker    Smokeless tobacco: Never Used   Substance and Sexual Activity    Alcohol use: Yes     Alcohol/week: 2.0 standard drinks     Types: 2 Glasses of wine per week     Comment: occasional    Drug use: Yes    Sexual activity: Not on file     Review of Systems   Constitutional: Positive for chills, fatigue and fever.   HENT: Positive for dental problem. Negative for sore throat.    Eyes: Negative for redness and itching.   Respiratory: Positive for cough and shortness of breath. Negative for chest tightness.    Cardiovascular: Negative for chest pain and palpitations.   Gastrointestinal: Positive for nausea. Negative for blood in stool.   Genitourinary: Negative for dysuria.   Musculoskeletal: Positive for myalgias. Negative for gait problem and joint swelling.   Neurological: Negative for tremors and weakness.   Psychiatric/Behavioral: Negative for behavioral problems and sleep disturbance.   All other systems reviewed and are negative.    Objective:     Vital Signs (Most Recent):  Temp: 99.7 °F (37.6 °C) (11/06/20 1308)  Pulse: 88 (11/06/20 1300)  Resp: 20 (11/06/20 1300)  BP: 123/74 (11/06/20 1300)  SpO2: (!) 93 % (11/06/20 1300) Vital Signs (24h Range):  Temp:  [99.7 °F (37.6 °C)-100 °F (37.8 °C)] 99.7 °F (37.6  °C)  Pulse:  [88-97] 88  Resp:  [20] 20  SpO2:  [93 %-97 %] 93 %  BP: (123-146)/(74-87) 123/74     Weight: 103.9 kg (229 lb)  Body mass index is 39.31 kg/m².    Physical Exam  Vitals signs and nursing note reviewed.   Constitutional:       Appearance: She is well-developed. She is ill-appearing and toxic-appearing.   HENT:      Head: Atraumatic.      Mouth/Throat:      Mouth: Mucous membranes are dry.   Eyes:      Pupils: Pupils are equal, round, and reactive to light.   Neck:      Musculoskeletal: Neck supple.      Vascular: No JVD.   Cardiovascular:      Rate and Rhythm: Normal rate and regular rhythm.      Heart sounds: Normal heart sounds. No murmur. No gallop.    Pulmonary:      Effort: Pulmonary effort is normal. No respiratory distress.      Breath sounds: Normal breath sounds. No wheezing.      Comments: On nasal cannula, mild rales in left lower lung field  Abdominal:      General: Abdomen is flat. Bowel sounds are normal. There is no distension.      Palpations: Abdomen is soft.      Tenderness: There is no guarding or rebound.   Musculoskeletal: Normal range of motion.   Lymphadenopathy:      Cervical: No cervical adenopathy.   Skin:     General: Skin is warm.      Capillary Refill: Capillary refill takes less than 2 seconds.      Findings: No rash.   Neurological:      Mental Status: She is alert and oriented to person, place, and time.      Cranial Nerves: No cranial nerve deficit.   Psychiatric:         Behavior: Behavior normal.           CRANIAL NERVES     CN III, IV, VI   Pupils are equal, round, and reactive to light.       Significant Labs: All pertinent labs within the past 24 hours have been reviewed.    Significant Imaging: I have reviewed all pertinent imaging results/findings within the past 24 hours.

## 2020-11-06 NOTE — H&P
Critical access hospital Medicine  History & Physical    Patient Name: Jalyn Dickson  MRN: [id number]  Admission Date: 11/6/2020  Attending Physician: Tod Jaffe MD   Primary Care Provider: Rachel Wagner MD         Patient information was obtained from patient, past medical records and ER records.     Subjective:     Principal Problem:Acute hypoxemic respiratory failure    Chief Complaint:   Chief Complaint   Patient presents with    Shortness of Breath     pt diagnosed Monday at [facility] with COVID, worsening SOB with fever today        HPI: 50-year-old lady with past medical history of hypertension, pulmonary embolism in the past requiring anticoagulation came with chief complaint of shortness of breath and fever.  Upon further asking patient mentioned that she was in her usual state of health until about a week ago when she started experiencing dry cough, severe myalgia and her  was diagnosed with COVID-19 who is currently admitted in [facility].  Progressively her symptoms got worse and she was diagnosed with COVID-19 on November 2nd at urgent care.  She was monitoring her oxygen saturation today she noticed her SpO2 of 90% and she also started experiencing high-grade fever and decided to come to ED.  As per ED provider upon arrival her oxygen saturation was 81% on room air that quickly improved with 3 L oxygen via nasal cannula.  Otherwise she also reports fever, chills, severe myalgia, shortness of breath, cough and loss of taste.  Denies any headache, dizziness, chest pain, palpitations, nausea, vomiting, bladder or bowel symptoms.    Past Medical History:   Diagnosis Date    Acid reflux     Allergy     Anxiety     Hypertension     Neck mass     Pulmonary embolism     Sinus drainage        Past Surgical History:   Procedure Laterality Date    NECK LESION BIOPSY      neck mass removal      TUBAL LIGATION  1999       Review of patient's allergies indicates:  No Known  Allergies    No current facility-administered medications on file prior to encounter.      Current Outpatient Medications on File Prior to Encounter   Medication Sig    albuterol (PROAIR HFA) 90 mcg/actuation inhaler Inhale 1 puff into the lungs every 4 (four) hours as needed.     amLODIPine-valsartan-hcthiazid -12.5 mg Tab Take 1 tablet by mouth once daily.    ascorbic acid, vitamin C, (VITAMIN C) 1000 MG tablet Take 1,000 mg by mouth once daily.    aspirin 81 MG Chew Take 81 mg by mouth once daily.    b complex vitamins tablet Take 1 tablet by mouth once daily.    cyanocobalamin (VITAMIN B-12) 1000 MCG tablet Take 100 mcg by mouth once daily.    guaiFENesin (MUCINEX) 600 mg 12 hr tablet Take 1,200 mg by mouth 2 (two) times daily.    levocetirizine (XYZAL) 5 MG tablet Take 5 mg by mouth every evening.    LORazepam (ATIVAN) 0.5 MG tablet Take 1 tablet (0.5 mg total) by mouth daily as needed for Anxiety.    omeprazole (PRILOSEC) 20 MG capsule Take 20 mg by mouth once daily.    promethazine-codeine 6.25-10 mg/5 ml (PHENERGAN WITH CODEINE) 6.25-10 mg/5 mL syrup Take 5 mLs by mouth every 6 (six) hours as needed.     valACYclovir (VALTREX) 500 MG tablet Take 500 mg by mouth once daily.     vitamin E 400 UNIT capsule Take 400 Units by mouth once daily.    AFLURIA QD 2020-21,3YR UP,,PF, 60 mcg (15 mcg x 4)/0.5 mL Syrg ADM 0.5ML IM UTD    amLODIPine (NORVASC) 10 MG tablet amlodipine 10 mg tablet    amLODIPine (NORVASC) 5 MG tablet amlodipine 5 mg tablet    ferrous sulfate (FEOSOL) 325 mg (65 mg iron) Tab tablet Take 325 mg by mouth once daily.     GENERLAC 10 gram/15 mL solution Take 10 g by mouth 2 (two) times daily.     hydroCHLOROthiazide (MICROZIDE) 12.5 mg capsule hydrochlorothiazide 12.5 mg capsule    losartan (COZAAR) 50 MG tablet losartan 50 mg tablet    mometasone (NASONEX) 50 mcg/actuation nasal spray Nasonex 50 mcg/actuation Spray    valsartan (DIOVAN) 80 MG tablet Diovan 80 mg tablet      Family History     Problem Relation (Age of Onset)    Coronary artery disease Father    Diabetes Father    Heart disease Mother    Hypertension Father, Maternal Grandmother, Paternal Grandfather    No Known Problems Maternal Grandfather, Son    PDD Paternal Grandfather    Peripheral vascular disease Father        Tobacco Use    Smoking status: Never Smoker    Smokeless tobacco: Never Used   Substance and Sexual Activity    Alcohol use: Yes     Alcohol/week: 2.0 standard drinks     Types: 2 Glasses of wine per week     Comment: occasional    Drug use: Yes    Sexual activity: Not on file     Review of Systems   Constitutional: Positive for chills, fatigue and fever.   HENT: Positive for dental problem. Negative for sore throat.    Eyes: Negative for redness and itching.   Respiratory: Positive for cough and shortness of breath. Negative for chest tightness.    Cardiovascular: Negative for chest pain and palpitations.   Gastrointestinal: Positive for nausea. Negative for blood in stool.   Genitourinary: Negative for dysuria.   Musculoskeletal: Positive for myalgias. Negative for gait problem and joint swelling.   Neurological: Negative for tremors and weakness.   Psychiatric/Behavioral: Negative for behavioral problems and sleep disturbance.   All other systems reviewed and are negative.    Objective:     Vital Signs (Most Recent):  Temp: 99.7 °F (37.6 °C) (11/06/20 1308)  Pulse: 88 (11/06/20 1300)  Resp: 20 (11/06/20 1300)  BP: 123/74 (11/06/20 1300)  SpO2: (!) 93 % (11/06/20 1300) Vital Signs (24h Range):  Temp:  [99.7 °F (37.6 °C)-100 °F (37.8 °C)] 99.7 °F (37.6 °C)  Pulse:  [88-97] 88  Resp:  [20] 20  SpO2:  [93 %-97 %] 93 %  BP: (123-146)/(74-87) 123/74     Weight: 103.9 kg (229 lb)  Body mass index is 39.31 kg/m².    Physical Exam  Vitals signs and nursing note reviewed.   Constitutional:       Appearance: She is well-developed. She is ill-appearing and toxic-appearing.   HENT:      Head: Atraumatic.       Mouth/Throat:      Mouth: Mucous membranes are dry.   Eyes:      Pupils: Pupils are equal, round, and reactive to light.   Neck:      Musculoskeletal: Neck supple.      Vascular: No JVD.   Cardiovascular:      Rate and Rhythm: Normal rate and regular rhythm.      Heart sounds: Normal heart sounds. No murmur. No gallop.    Pulmonary:      Effort: Pulmonary effort is normal. No respiratory distress.      Breath sounds: Normal breath sounds. No wheezing.      Comments: On nasal cannula, mild rales in left lower lung field  Abdominal:      General: Abdomen is flat. Bowel sounds are normal. There is no distension.      Palpations: Abdomen is soft.      Tenderness: There is no guarding or rebound.   Musculoskeletal: Normal range of motion.   Lymphadenopathy:      Cervical: No cervical adenopathy.   Skin:     General: Skin is warm.      Capillary Refill: Capillary refill takes less than 2 seconds.      Findings: No rash.   Neurological:      Mental Status: She is alert and oriented to person, place, and time.      Cranial Nerves: No cranial nerve deficit.   Psychiatric:         Behavior: Behavior normal.           CRANIAL NERVES     CN III, IV, VI   Pupils are equal, round, and reactive to light.       Significant Labs: All pertinent labs within the past 24 hours have been reviewed.    Significant Imaging: I have reviewed all pertinent imaging results/findings within the past 24 hours.    Assessment/Plan:     50-year-old lady whose  is currently admitted in [facility] due to COVID-19 came with fever, shortness of breath and hypoxia.    Assessment:  Active Hospital Problems    Diagnosis  POA    *Acute hypoxemic respiratory failure due to COVID-19 [J96.01]  Yes    Pneumonia due to COVID-19 virus [U07.1, J12.89]  Yes     Priority: 2     Essential hypertension [I10]  Yes    History of Pulmonary embolism, bilateral [I26.99]  Yes      Resolved Hospital Problems   No resolved problems to display.       Plan:  Admit to COVID  unit-inpatient    Airborne, contact and droplet isolation    According to ED physician her oxygen saturation was 81% on room air that quickly improved with 3 L oxygen.     Decadron, p.r.n. antitussives, p.r.n. antipyretics, bronchodilators    Patient agreed upon IV Remdesivir    Consult infectious disease to consider convalescent plasma    Subcu Lovenox, Zinc, vitamin-C, vitamin-D, Pepcid, melatonin    Supplemental oxygen-wean as tolerated    Daily labs including inflammatory markers    Obtain baseline D-dimer.  Type and Cross    VTE Risk Mitigation (From admission, onward)    None             Tod Jaffe MD  Department of Hospital Medicine   [facility]

## 2020-11-06 NOTE — ED PROVIDER NOTES
Encounter Date: 11/6/2020       History     Chief Complaint   Patient presents with    Shortness of Breath     pt diagnosed Monday at [facility] with COVID, worsening SOB with fever today     50-year-old female with a history of hypertension, pulmonary embolism 3 years ago successfully treated with anticoagulants and no longer on anticoagulant therapy anymore, anxiety, GERD, seasonal allergies, presents to the ER today with complaints of worsening shortness of breath with exertion productive cough, fatigue body aches and fever.  Patient states on Monday, November 2nd, she went to an outpatient clinic and was diagnosed with coronavirus.  Since then she has been monitoring her symptoms at home.  She states last night and today she has noticed her fever to increase despite taking antipyretic medication, additionally has noticed worsening shortness of breath and the cough becoming more productive.  She decided come to the ER for further evaluation.  She states she has an at home pulse oximeter that she has been monitoring her O2 sats on, and today while at rest sitting in a chair noticed a consistent pulse oximeter reading of 90% on room air.  This concerned her so this also encouraged her to come to the emergency room today.  She states no history of CHF for COPD or any pulmonary chronic medical conditions.  No other complaints.  She denies chest pain specifically.  She denies lower extremity edema.        Review of patient's allergies indicates:  No Known Allergies  Past Medical History:   Diagnosis Date    Acid reflux     Allergy     Anxiety     Hypertension     Neck mass     Pulmonary embolism     Sinus drainage      Past Surgical History:   Procedure Laterality Date    NECK LESION BIOPSY      neck mass removal      TUBAL LIGATION  1999     Family History   Problem Relation Age of Onset    Heart disease Mother     Coronary artery disease Father     Peripheral vascular disease Father     Diabetes  Father     Hypertension Father     Hypertension Maternal Grandmother     No Known Problems Maternal Grandfather     PDD Paternal Grandfather     Hypertension Paternal Grandfather     No Known Problems Son      Social History     Tobacco Use    Smoking status: Never Smoker    Smokeless tobacco: Never Used   Substance Use Topics    Alcohol use: Yes     Alcohol/week: 2.0 standard drinks     Types: 2 Glasses of wine per week     Comment: occasional    Drug use: Yes     Review of Systems   Constitutional: Positive for activity change, chills, diaphoresis, fatigue and fever.   HENT: Negative for congestion and sore throat.    Eyes: Negative for photophobia and visual disturbance.   Respiratory: Positive for cough, chest tightness and shortness of breath. Negative for wheezing and stridor.    Cardiovascular: Negative for chest pain, palpitations and leg swelling.   Gastrointestinal: Negative for abdominal pain, constipation, diarrhea, nausea and vomiting.   Endocrine: Negative for polydipsia, polyphagia and polyuria.   Genitourinary: Negative for decreased urine volume, difficulty urinating, dysuria, flank pain, frequency, hematuria and urgency.   Musculoskeletal: Negative for arthralgias, back pain, gait problem, myalgias, neck pain and neck stiffness.   Skin: Negative for color change, rash and wound.   Allergic/Immunologic: Negative for immunocompromised state.   Neurological: Positive for weakness and headaches. Negative for dizziness, seizures, syncope, speech difficulty and light-headedness.   Hematological: Does not bruise/bleed easily.   Psychiatric/Behavioral: Negative for agitation and confusion.   All other systems reviewed and are negative.      Physical Exam     Initial Vitals   BP Pulse Resp Temp SpO2   11/06/20 1145 11/06/20 1145 11/06/20 1147 11/06/20 1147 11/06/20 1145   (!) 146/87 95 20 100 °F (37.8 °C) 97 %      MAP       --                Physical Exam    Nursing note and vitals  reviewed.  Constitutional: She appears well-developed and well-nourished. She is not diaphoretic. She is Obese .   HENT:   Head: Normocephalic and atraumatic.   Right Ear: External ear normal.   Left Ear: External ear normal.   Nose: Nose normal.   Mouth/Throat: Oropharynx is clear and moist. No oropharyngeal exudate.   Eyes: Conjunctivae are normal. Pupils are equal, round, and reactive to light.   Neck: Normal range of motion. Neck supple.   Cardiovascular: Normal rate.   97 BPM    Pulmonary/Chest: Effort normal. No tachypnea. No respiratory distress. She has decreased breath sounds in the right lower field and the left lower field. She has no wheezes. She has no rhonchi.   Nonlabored respirations.  Possibly slight decreased breath sounds to the lower lung fields bilaterally, no obvious rhonchi or rales on my exam.  O2 sats are 94-96% resting in bed on room air.    Abdominal: Soft. Bowel sounds are normal. There is no abdominal tenderness.   Musculoskeletal: Normal range of motion. No tenderness or edema.   Neurological: She is alert and oriented to person, place, and time. She has normal strength. GCS score is 15. GCS eye subscore is 4. GCS verbal subscore is 5. GCS motor subscore is 6.   Skin: Skin is warm and dry. Capillary refill takes less than 2 seconds. No rash noted. No erythema.   Psychiatric: She has a normal mood and affect. Thought content normal.         ED Course   Procedures  Labs Reviewed   CBC W/ AUTO DIFFERENTIAL - Abnormal; Notable for the following components:       Result Value    MCH 26.2 (*)     MCHC 31.6 (*)     RDW 14.6 (*)     All other components within normal limits   C-REACTIVE PROTEIN - Abnormal; Notable for the following components:    CRP 2.20 (*)     All other components within normal limits   CULTURE, BLOOD   CULTURE, BLOOD   COMPREHENSIVE METABOLIC PANEL   FERRITIN   LACTATE DEHYDROGENASE   CK   LACTIC ACID, PLASMA   TROPONIN I   B-TYPE NATRIURETIC PEPTIDE   D DIMER, QUANTITATIVE    PROCALCITONIN   D DIMER, QUANTITATIVE   TYPE & SCREEN     EKG Readings: (Independently Interpreted)   Initial Reading: No STEMI. Rhythm: Normal Sinus Rhythm. Heart Rate: 90. Ectopy: No Ectopy. Conduction: Normal.     ECG Results          EKG 12-lead (In process)  Result time 11/06/20 12:53:31    In process by Interface, Lab In [facility] (11/06/20 12:53:31)                 Narrative:    Test Reason : R06.02,    Vent. Rate : 090 BPM     Atrial Rate : 090 BPM     P-R Int : 164 ms          QRS Dur : 084 ms      QT Int : 358 ms       P-R-T Axes : 054 015 071 degrees     QTc Int : 437 ms    Normal sinus rhythm  Low voltage QRS  Septal infarct (cited on or before 23-APR-2014)  Abnormal ECG  When compared with ECG of 23-APR-2014 09:28,  Questionable change in initial forces of Septal leads    Referred By: [email or web address]   SELF           Confirmed By:                             Imaging Results          X-Ray Chest AP Portable (Final result)  Result time 11/06/20 12:55:26    Final result by Daniel Marie MD (11/06/20 12:55:26)                 Narrative:    Chest single view    CLINICAL DATA: Shortness of breath    FINDINGS: AP view is compared to July 2019.    Heart size is normal. The mediastinum is unremarkable. The right lung  is clear. Minor atelectasis or infiltrate at the left lung base.  Osseous structures are unremarkable.    IMPRESSION:  1. Minimal left basilar atelectasis or infiltrate.  2. No other abnormalities.    Electronically Signed by Deepak Marie M.D. on 11/6/2020 12:59 PM                            Imaging Results          X-Ray Chest AP Portable (Final result)  Result time 11/06/20 12:55:26    Final result by Daniel Marie MD (11/06/20 12:55:26)                 Narrative:    Chest single view    CLINICAL DATA: Shortness of breath    FINDINGS: AP view is compared to July 2019.    Heart size is normal. The mediastinum is unremarkable. The right lung  is clear. Minor atelectasis or infiltrate at the  left lung base.  Osseous structures are unremarkable.    IMPRESSION:  1. Minimal left basilar atelectasis or infiltrate.  2. No other abnormalities.    Electronically Signed by Deepak Marie M.D. on 11/6/2020 12:59 PM                            Results for orders placed or performed during the hospital encounter of 11/06/20   CBC auto differential   Result Value Ref Range    WBC 5.37 3.90 - 12.70 K/uL    RBC 4.62 4.00 - 5.40 M/uL    Hemoglobin 12.1 12.0 - 16.0 g/dL    Hematocrit 38.3 37.0 - 48.5 %    MCV 83 82 - 98 fL    MCH 26.2 (L) 27.0 - 31.0 pg    MCHC 31.6 (L) 32.0 - 36.0 g/dL    RDW 14.6 (H) 11.5 - 14.5 %    Platelets 343 150 - 350 K/uL    MPV 10.5 9.2 - 12.9 fL    Immature Granulocytes 0.4 0.0 - 0.5 %    Gran # (ANC) 3.4 1.8 - 7.7 K/uL    Immature Grans (Abs) 0.02 0.00 - 0.04 K/uL    Lymph # 1.3 1.0 - 4.8 K/uL    Mono # 0.7 0.3 - 1.0 K/uL    Eos # 0.0 0.0 - 0.5 K/uL    Baso # 0.01 0.00 - 0.20 K/uL    nRBC 0 0 /100 WBC    Gran % 62.5 38.0 - 73.0 %    Lymph % 24.2 18.0 - 48.0 %    Mono % 12.1 4.0 - 15.0 %    Eosinophil % 0.6 0.0 - 8.0 %    Basophil % 0.2 0.0 - 1.9 %    Differential Method Automated    Comprehensive metabolic panel   Result Value Ref Range    Sodium 137 136 - 145 mmol/L    Potassium 3.7 3.5 - 5.1 mmol/L    Chloride 99 95 - 110 mmol/L    CO2 25 23 - 29 mmol/L    Glucose 97 70 - 110 mg/dL    BUN 7 6 - 20 mg/dL    Creatinine 0.6 0.5 - 1.4 mg/dL    Calcium 8.9 8.7 - 10.5 mg/dL    Total Protein 7.5 6.0 - 8.4 g/dL    Albumin 3.9 3.5 - 5.2 g/dL    Total Bilirubin 0.9 0.1 - 1.0 mg/dL    Alkaline Phosphatase 59 55 - 135 U/L    AST 27 10 - 40 U/L    ALT 25 10 - 44 U/L    Anion Gap 13 8 - 16 mmol/L    eGFR if African American >60.0 >60 mL/min/1.73 m^2    eGFR if non African American >60.0 >60 mL/min/1.73 m^2   C-Reactive Protein   Result Value Ref Range    CRP 2.20 (H) <0.76 mg/dL   Ferritin   Result Value Ref Range    Ferritin 28 20.0 - 300.0 ng/mL   Lactate Dehydrogenase   Result Value Ref Range    LD  155 110 - 260 U/L   CK   Result Value Ref Range    CPK 49 20 - 180 U/L   Lactic Acid, Plasma   Result Value Ref Range    Lactate (Lactic Acid) 1.0 0.5 - 1.9 mmol/L   Troponin I   Result Value Ref Range    Troponin I <0.030 <=0.040 ng/mL   Brain Natriuretic Peptide   Result Value Ref Range    BNP 17 0 - 99 pg/mL   Type & Screen   Result Value Ref Range    Group & Rh B POS     Indirect Andreia NEG        X-Rays:   Independently Interpreted Readings:   Chest X-Ray: Normal heart size. Left lower lobe pneumonia     Medical Decision Making:   History:   Old Medical Records: I decided to obtain old medical records.  Independently Interpreted Test(s):   I have ordered and independently interpreted X-rays - see prior notes.  I have ordered and independently interpreted EKG Reading(s) - see prior notes  Clinical Tests:   Lab Tests: Ordered and Reviewed  The following lab test(s) were unremarkable: CBC, CMP, Troponin and BNP       <> Summary of Lab:   CRP 2.2    Normal ferritin LDH and CP K, lactate 1    Blood cultures pending   Radiological Study: Ordered and Reviewed  Medical Tests: Ordered and Reviewed  ED Management:  Attending Note:  I provided a face to face evaluation of this patient.  I discussed the patient's care with Advanced Practice Clinician.  I reviewed their note and agree with the history, physical, assessment, diagnosis, treatment, all procedures performed, xray and EKG interpretations and  plan provided by the Advanced Practice Clinician. My overall impression is COVID-19 infection with left lower lobe pneumonia, hypoxia.  Patient has been sick for approximately 8 days her  is currently admitted in the ICU at [facility] for COVID, she has been worsening set her sats were 90% at home.  Here at rest she is 93-94% on room air when she ambulates she desats to 81%.  Becomes very short of breath.  She rapidly improved on 3 L nasal cannula.  Her chest x-ray reveals a left lower lobe infiltrate  but not the typical interstitial pneumonia pattern consistent with COVID.  Her EKG showed no ischemic changes.  She has a dry cough.  All labs have not yet been drawn.  The patient is ready for admission.  Will also do blood cultures and give azithromycin Rocephin IV and 8 mg of Decadron IV.  She will be admitted to Hospital Medicine Service.  Charu Clark M.D. 11/6/2020 2:49 PM  Labs have returned normal CBC lactate.  CRP is elevated 2.2.  Troponin and BNP are normal.  Patient will be admitted to Hospital Medicine  has seen the pt and admitted her.   Charu Clark M.D.  4:15 PM 11/6/2020    Other:   I have discussed this case with another health care provider.                             Clinical Impression:       ICD-10-CM ICD-9-CM   1. Pneumonia due to COVID-19 virus  U07.1 480.8    J12.89    2. Cough  R05 786.2   3. SOB (shortness of breath)  R06.02 786.05   4. Hypoxia  R09.02 799.02                          ED Disposition Condition    Admit                             Charu Clark MD  11/06/20 [id number]

## 2020-11-06 NOTE — ED NOTES
"First contact with pt. She is laying on stretcher. She reports onset fever yesterday that was as high has 100.4 at home. She reports today she has been having SOB. (+) productive cough with thick yellow sputum x 4-5 days. Dry cough started approx 10/28/20 prior to her being tested for covid. She reports her SOB increases with activity. She states she checked her SpO2 at home dropped to 90% and she "got nervous and decided to come in." Currently she is connected to continuous SpO2 monitor and her sats are 97% on RA. She complains of slight headache today.   "

## 2020-11-07 LAB
ALBUMIN SERPL BCP-MCNC: 3.8 G/DL (ref 3.5–5.2)
ALP SERPL-CCNC: 58 U/L (ref 55–135)
ALT SERPL W/O P-5'-P-CCNC: 33 U/L (ref 10–44)
ANION GAP SERPL CALC-SCNC: 11 MMOL/L (ref 8–16)
AST SERPL-CCNC: 33 U/L (ref 10–40)
BASOPHILS # BLD AUTO: 0.01 K/UL (ref 0–0.2)
BASOPHILS NFR BLD: 0.3 % (ref 0–1.9)
BILIRUB SERPL-MCNC: 0.6 MG/DL (ref 0.1–1)
BLD PROD TYP BPU: NORMAL
BLD PROD TYP BPU: NORMAL
BLOOD UNIT EXPIRATION DATE: NORMAL
BLOOD UNIT EXPIRATION DATE: NORMAL
BLOOD UNIT TYPE CODE: 1700
BLOOD UNIT TYPE CODE: 7300
BLOOD UNIT TYPE: NORMAL
BLOOD UNIT TYPE: NORMAL
BUN SERPL-MCNC: 9 MG/DL (ref 6–20)
CALCIUM SERPL-MCNC: 8.9 MG/DL (ref 8.7–10.5)
CHLORIDE SERPL-SCNC: 103 MMOL/L (ref 95–110)
CO2 SERPL-SCNC: 25 MMOL/L (ref 23–29)
CODING SYSTEM: NORMAL
CODING SYSTEM: NORMAL
CREAT SERPL-MCNC: 0.5 MG/DL (ref 0.5–1.4)
DIFFERENTIAL METHOD: ABNORMAL
DISPENSE STATUS: NORMAL
DISPENSE STATUS: NORMAL
EOSINOPHIL # BLD AUTO: 0 K/UL (ref 0–0.5)
EOSINOPHIL NFR BLD: 0 % (ref 0–8)
ERYTHROCYTE [DISTWIDTH] IN BLOOD BY AUTOMATED COUNT: 14.7 % (ref 11.5–14.5)
EST. GFR  (AFRICAN AMERICAN): >60 ML/MIN/1.73 M^2
EST. GFR  (NON AFRICAN AMERICAN): >60 ML/MIN/1.73 M^2
GLUCOSE SERPL-MCNC: 115 MG/DL (ref 70–110)
HCT VFR BLD AUTO: 39.7 % (ref 37–48.5)
HGB BLD-MCNC: 12.2 G/DL (ref 12–16)
IMM GRANULOCYTES # BLD AUTO: 0.01 K/UL (ref 0–0.04)
IMM GRANULOCYTES NFR BLD AUTO: 0.3 % (ref 0–0.5)
LYMPHOCYTES # BLD AUTO: 0.9 K/UL (ref 1–4.8)
LYMPHOCYTES NFR BLD: 25.1 % (ref 18–48)
MAGNESIUM SERPL-MCNC: 2.2 MG/DL (ref 1.6–2.6)
MCH RBC QN AUTO: 26.8 PG (ref 27–31)
MCHC RBC AUTO-ENTMCNC: 30.7 G/DL (ref 32–36)
MCV RBC AUTO: 87 FL (ref 82–98)
MONOCYTES # BLD AUTO: 0.2 K/UL (ref 0.3–1)
MONOCYTES NFR BLD: 6.6 % (ref 4–15)
NEUTROPHILS # BLD AUTO: 2.4 K/UL (ref 1.8–7.7)
NEUTROPHILS NFR BLD: 67.7 % (ref 38–73)
NRBC BLD-RTO: 0 /100 WBC
PHOSPHATE SERPL-MCNC: 4.9 MG/DL (ref 2.7–4.5)
PLATELET # BLD AUTO: 377 K/UL (ref 150–350)
PMV BLD AUTO: 11 FL (ref 9.2–12.9)
POTASSIUM SERPL-SCNC: 3.8 MMOL/L (ref 3.5–5.1)
PROT SERPL-MCNC: 7.7 G/DL (ref 6–8.4)
RBC # BLD AUTO: 4.56 M/UL (ref 4–5.4)
SODIUM SERPL-SCNC: 139 MMOL/L (ref 136–145)
TROPONIN I SERPL DL<=0.01 NG/ML-MCNC: <0.03 NG/ML
UNIT NUMBER: NORMAL
UNIT NUMBER: NORMAL
WBC # BLD AUTO: 3.51 K/UL (ref 3.9–12.7)

## 2020-11-07 PROCEDURE — 99900035 HC TECH TIME PER 15 MIN (STAT)

## 2020-11-07 PROCEDURE — 36415 COLL VENOUS BLD VENIPUNCTURE: CPT

## 2020-11-07 PROCEDURE — 12000002 HC ACUTE/MED SURGE SEMI-PRIVATE ROOM

## 2020-11-07 PROCEDURE — 25000242 PHARM REV CODE 250 ALT 637 W/ HCPCS: Performed by: HOSPITALIST

## 2020-11-07 PROCEDURE — 27000221 HC OXYGEN, UP TO 24 HOURS

## 2020-11-07 PROCEDURE — 99223 PR INITIAL HOSPITAL CARE,LEVL III: ICD-10-PCS | Mod: ,,, | Performed by: INTERNAL MEDICINE

## 2020-11-07 PROCEDURE — 80053 COMPREHEN METABOLIC PANEL: CPT

## 2020-11-07 PROCEDURE — 85025 COMPLETE CBC W/AUTO DIFF WBC: CPT

## 2020-11-07 PROCEDURE — 84100 ASSAY OF PHOSPHORUS: CPT

## 2020-11-07 PROCEDURE — 21400001 HC TELEMETRY ROOM

## 2020-11-07 PROCEDURE — 87070 CULTURE OTHR SPECIMN AEROBIC: CPT

## 2020-11-07 PROCEDURE — 94640 AIRWAY INHALATION TREATMENT: CPT

## 2020-11-07 PROCEDURE — 83735 ASSAY OF MAGNESIUM: CPT

## 2020-11-07 PROCEDURE — 94761 N-INVAS EAR/PLS OXIMETRY MLT: CPT

## 2020-11-07 PROCEDURE — 99223 1ST HOSP IP/OBS HIGH 75: CPT | Mod: ,,, | Performed by: INTERNAL MEDICINE

## 2020-11-07 PROCEDURE — 84484 ASSAY OF TROPONIN QUANT: CPT

## 2020-11-07 PROCEDURE — 63600175 PHARM REV CODE 636 W HCPCS: Performed by: HOSPITALIST

## 2020-11-07 PROCEDURE — 87205 SMEAR GRAM STAIN: CPT

## 2020-11-07 PROCEDURE — 25000003 PHARM REV CODE 250: Performed by: HOSPITALIST

## 2020-11-07 RX ORDER — HYDROCODONE BITARTRATE AND ACETAMINOPHEN 500; 5 MG/1; MG/1
TABLET ORAL
Status: DISCONTINUED | OUTPATIENT
Start: 2020-11-07 | End: 2020-11-09 | Stop reason: HOSPADM

## 2020-11-07 RX ORDER — BENZONATATE 100 MG/1
100 CAPSULE ORAL 3 TIMES DAILY
Status: DISCONTINUED | OUTPATIENT
Start: 2020-11-07 | End: 2020-11-09 | Stop reason: HOSPADM

## 2020-11-07 RX ADMIN — FAMOTIDINE 20 MG: 20 TABLET, FILM COATED ORAL at 09:11

## 2020-11-07 RX ADMIN — DEXAMETHASONE 6 MG: 2 TABLET ORAL at 09:11

## 2020-11-07 RX ADMIN — Medication 2000 UNITS: at 09:11

## 2020-11-07 RX ADMIN — HYDROCHLOROTHIAZIDE 12.5 MG: 12.5 TABLET ORAL at 09:11

## 2020-11-07 RX ADMIN — IPRATROPIUM BROMIDE 2 PUFF: 17 AEROSOL, METERED RESPIRATORY (INHALATION) at 07:11

## 2020-11-07 RX ADMIN — SENNOSIDES AND DOCUSATE SODIUM 1 TABLET: 8.6; 5 TABLET ORAL at 09:11

## 2020-11-07 RX ADMIN — VALSARTAN 160 MG: 80 TABLET ORAL at 09:11

## 2020-11-07 RX ADMIN — ALBUTEROL SULFATE 2 PUFF: 90 AEROSOL, METERED RESPIRATORY (INHALATION) at 09:11

## 2020-11-07 RX ADMIN — ASPIRIN 81 MG: 81 TABLET, CHEWABLE ORAL at 09:11

## 2020-11-07 RX ADMIN — AMLODIPINE BESYLATE 10 MG: 5 TABLET ORAL at 09:11

## 2020-11-07 RX ADMIN — ALBUTEROL SULFATE 2 PUFF: 90 AEROSOL, METERED RESPIRATORY (INHALATION) at 07:11

## 2020-11-07 RX ADMIN — ALBUTEROL SULFATE 2 PUFF: 90 AEROSOL, METERED RESPIRATORY (INHALATION) at 01:11

## 2020-11-07 RX ADMIN — POTASSIUM CHLORIDE 20 MEQ: 20 TABLET, EXTENDED RELEASE ORAL at 06:11

## 2020-11-07 RX ADMIN — GUAIFENESIN 1200 MG: 600 TABLET, EXTENDED RELEASE ORAL at 09:11

## 2020-11-07 RX ADMIN — ENOXAPARIN SODIUM 40 MG: 40 INJECTION SUBCUTANEOUS at 05:11

## 2020-11-07 RX ADMIN — IPRATROPIUM BROMIDE 2 PUFF: 17 AEROSOL, METERED RESPIRATORY (INHALATION) at 09:11

## 2020-11-07 RX ADMIN — REMDESIVIR 100 MG: 100 INJECTION, POWDER, LYOPHILIZED, FOR SOLUTION INTRAVENOUS at 09:11

## 2020-11-07 RX ADMIN — IPRATROPIUM BROMIDE 2 PUFF: 17 AEROSOL, METERED RESPIRATORY (INHALATION) at 01:11

## 2020-11-07 RX ADMIN — OXYCODONE HYDROCHLORIDE AND ACETAMINOPHEN 1000 MG: 500 TABLET ORAL at 09:11

## 2020-11-07 RX ADMIN — MELATONIN 9 MG: at 09:11

## 2020-11-07 NOTE — CONSULTS
Consult Note  Infectious Disease    Reason for Consult:      HPI: Jalyn Dickson is a well-appearing 50 y.o. 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    Her  and patient were at surgical unit about a week ago.  Then both of them developed cough myalgias shortness of breath.  Her  has been hospitalized in MICU.  Patient tried to manage herself at home.  She took Zithromax for 3 days.  She monitored her  pulse ox.  When pulse ox dropped to 90 and she became short of breath at rest she decided to come to emergency room.  She was found to be as low as 81% on room air but improved fast with 3 L O2.  She was given remdesivir.  IS, Decadron, melatonin, guaifenesin, famotidine.  Id was called for CCP.  She felt feverish but does not know her temperature.  She has progressive cough, worse when she breathes in deep, cough was nonproductive initially then it became slightly yellow, she received a dose of ceftriaxone and Zithromax in emergency room.  Sputum culture has not been sent.  Procalcitonin is normal.    Decreased appetite is present.  Decreased in taste.  No diarrhea.   She feels much improved while wearing FiO2.  She was trying to lay on her belly but it was not easy to do.  She is able to sit up in a chair that for a found her today.      Review of patient's allergies indicates:  No Known Allergies  Past Medical History:   Diagnosis Date    Acid reflux     Allergy     Anxiety     Hypertension     Neck mass     Pulmonary embolism     Sinus drainage      Past Surgical History:   Procedure Laterality Date    NECK LESION BIOPSY      neck mass removal      TUBAL LIGATION  1999     Social History     Socioeconomic History    Marital status:      Spouse name: Not on file    Number of children: Not on file    Years of education: Not on file    Highest education level: Not on file    Occupational History    Not on file   Social Needs    Financial resource strain: Not on file    Food insecurity     Worry: Not on file     Inability: Not on file    Transportation needs     Medical: Not on file     Non-medical: Not on file   Tobacco Use    Smoking status: Never Smoker    Smokeless tobacco: Never Used   Substance and Sexual Activity    Alcohol use: Yes     Alcohol/week: 2.0 standard drinks     Types: 2 Glasses of wine per week     Comment: occasional    Drug use: Yes    Sexual activity: Not on file   Lifestyle    Physical activity     Days per week: Not on file     Minutes per session: Not on file    Stress: To some extent   Relationships    Social connections     Talks on phone: Not on file     Gets together: Not on file     Attends [employer] service: Not on file     Active member of club or organization: Not on file     Attends meetings of clubs or organizations: Not on file     Relationship status: Not on file   Other Topics Concern    Not on file   Social History Narrative    Not on file     Family History   Problem Relation Age of Onset    Heart disease Mother     Coronary artery disease Father     Peripheral vascular disease Father     Diabetes Father     Hypertension Father     Hypertension Maternal Grandmother     No Known Problems Maternal Grandfather     PDD Paternal Grandfather     Hypertension Paternal Grandfather     No Known Problems Son        Pertinent medications noted:     Review of Systems:   Positive for chills, fever, sweats,  No weight loss  No change in vision, loss of vision or diplopia  No sinus congestion, purulent nasal discharge, post nasal drip or facial pain  No pain in mouth or throat. No problems with teeth, gums.  No chest pain, palpitations, syncope  Positive cough, with clear yellow sputum production, shortness of breath, dyspnea on exertion,   No pleurisy, hemoptysis  No nausea, vomiting, diarrhea, constipation, blood in stool, or focal  abd pain,  No dysphagia, odynophagia  No dysuria, hematuria, strangury, retention, incontinence, nocturia, prostatism,   No vaginal  discharge, genital ulcers, risk for STD  No swelling of joints, redness of joints, injuries, or new focal pain  No unusual headaches, dizziness, vertigo, numbness, paresthesias, neuropathy, falls  No anxiety, depression, substance abuse, sleep disturbance  No diabetes, thyroid, hypogonadal conditions  No bleeding, lymphadenopathy, anemia, malignancy, unusual bruising  No new rashes, lesions, or wounds  No TB exposure  No recent/prior steroids  Outdoor activities:  No  Travel:  No  Implants:  No  Antibiotic History:  Zithromax prior to admission    EXAM & DIAGNOSTICS REVIEWED:   Vitals:     Temp:  [97.8 °F (36.6 °C)-100 °F (37.8 °C)]   Temp: 98.1 °F (36.7 °C) (11/07/20 0713)  Pulse: 74 (11/07/20 0718)  Resp: 18 (11/07/20 0718)  BP: 132/76 (11/07/20 0713)  SpO2: 97 % (11/07/20 0718)    Intake/Output Summary (Last 24 hours) at 11/7/2020 0905  Last data filed at 11/6/2020 2100  Gross per 24 hour   Intake 240 ml   Output --   Net 240 ml       General:  In NAD. Alert and attentive, cooperative, comfortable  Eyes:  Anicteric, PERRL, EOMI  ENT:  No ulcers, exudates, thrush, nares patent, dentition is good  Neck:  supple, no masses   appreciated  Lungs: Diminished at bases.  Faint crackles   Heart:  RRR, no gallop/murmur/rub noted  Abd:  Soft, NT, ND, normal BS, no masses or organomegaly appreciated.  Obese  :  Voids ;  no flank tenderness  Musc:  Joints without effusion, swelling, erythema, synovitis, muscle wasting.   Skin:  No rashes. No palmar or plantar lesions. No subungual petechiae  Wound:   Neuro: Alert, attentive, speech fluent, face symmetric, moves all extremities, no focal weakness. Ambulatory  Psych:  Calm, cooperative  Lymphatic:     No cervical,  nodes  Extrem: No edema, erythema, phlebitis, cellulitis, warm and well perfused  VAD:       Isolation:       Lines/Tubes/Drains:    General Labs reviewed:  Recent Labs   Lab 11/06/20  1459 11/07/20  0428   WBC 5.37 3.51*   HGB 12.1 12.2   HCT 38.3 39.7    377*       Recent Labs   Lab 11/06/20  1459 11/07/20  0428    139   K 3.7 3.8   CL 99 103   CO2 25 25   BUN 7 9   CREATININE 0.6 0.5   CALCIUM 8.9 8.9   PROT 7.5 7.7   BILITOT 0.9 0.6   ALKPHOS 59 58   ALT 25 33   AST 27 33           Micro:  Microbiology Results (last 7 days)     Procedure Component Value Units Date/Time    Blood culture #1 **CANNOT BE ORDERED STAT** [[id number]] Collected: 11/06/20 1540    Order Status: Completed Specimen: Blood from Peripheral, Antecubital, Right Updated: 11/06/20 2317     Blood Culture, Routine No Growth to date    Blood culture #2 **CANNOT BE ORDERED STAT** [[id number]] Collected: 11/06/20 1555    Order Status: Completed Specimen: Blood from Peripheral, Antecubital, Right Updated: 11/06/20 2317     Blood Culture, Routine No Growth to date        Imaging Reviewed:   CXR1. Minimal left basilar atelectasis or infiltrate.  2. No other abnormalities.     Cardiology:  Sinus rhythm    IMPRESSION & PLAN     COVID illness, pneumonia  Hypoxemia due to above.  Obesity  History of PE.      Recommendations:  Continue RDV  Will add CCP  She is improving quick which is reassuring  Continue guaifenesin, add Tessalon  Continue vitamin-C, vitamin-D, melatonin, famotidine,  Continue Decadron daily  Continue DVT prophylaxis.  Will monitor D-dimer closely, especially given her history of PE  Monitor markers of inflammation and daily labs.  Check sputum culture    Medical Decision Making during this encounter was  [_] Low Complexity  [_] Moderate Complexity  [ x ] High Complexity    I extensively discussed with patient COVID convalescent plasma.  Risks and benefits.  I discussed especially the fact that this is a blood product and got a consent for blood product transfusion.  Patient agreed.

## 2020-11-07 NOTE — CARE UPDATE
11/06/20 0090   Patient Assessment/Suction   Level of Consciousness (AVPU) alert   Respiratory Effort Normal;Unlabored   Expansion/Accessory Muscles/Retractions no use of accessory muscles;no retractions;expansion symmetric   All Lung Fields Breath Sounds equal bilaterally;diminished;clear   Rhythm/Pattern, Respiratory unlabored;pattern regular;depth regular   Cough Frequency frequent   Cough Type dry   PRE-TX-O2   O2 Device (Oxygen Therapy) nasal cannula   $ Is the patient on Low Flow Oxygen? Yes   Flow (L/min) 3   SpO2 98 %   Pulse Oximetry Type Continuous   $ Pulse Oximetry - Multiple Charge Pulse Oximetry - Multiple   Pulse 85   Resp 19   Respiratory Evaluation   $ Care Plan Tech Time 15 min

## 2020-11-07 NOTE — PLAN OF CARE
11/07/20 0717   Patient Assessment/Suction   Level of Consciousness (AVPU) alert   Respiratory Effort Unlabored   All Lung Fields Breath Sounds diminished   Cough Type nonproductive   PRE-TX-O2   O2 Device (Oxygen Therapy) nasal cannula   $ Is the patient on Low Flow Oxygen? Yes   Flow (L/min) 3   SpO2 97 %   Pulse Oximetry Type Continuous   $ Pulse Oximetry - Multiple Charge Pulse Oximetry - Multiple   Pulse 74   Resp 18   Inhaler   $ Inhaler Charges MDI (Metered Dose Inahler) Treatment;Given With Spacer   Respiratory Treatment Status (Inhaler) given   Treatment Route (Inhaler) spacer/holding chamber   Patient Position (Inhaler) sitting in chair   Signs of Intolerance (Inhaler) none   Respiratory Evaluation   $ Care Plan Tech Time 15 min

## 2020-11-07 NOTE — PROGRESS NOTES
Former[facility], later [facility]  Progress Note    Patient Name: Jalyn Dickson  MRN: [id number]  Patient Class: IP- Inpatient   Admission Date: 11/6/2020  Length of Stay: 1 days  Attending Physician: Darren Khoury MD  Primary Care Provider: Rachel Wagner MD        Subjective:     Principal Problem:Acute hypoxemic respiratory failure    Interval History:     Patient was seen and examined  She sitting on the chair and only on 1 L oxygen support and doing 97% and will wean of  Infectious disease want to continue with remdesivir and plasma    Review of Systems  Objective:     Vital Signs (Most Recent):  Temp: 98.2 °F (36.8 °C) (11/07/20 1521)  Pulse: 74 (11/07/20 1521)  Resp: 16 (11/07/20 1521)  BP: 120/81 (11/07/20 1521)  SpO2: 95 % (11/07/20 1521) Vital Signs (24h Range):  Temp:  [97.8 °F (36.6 °C)-98.5 °F (36.9 °C)] 98.2 °F (36.8 °C)  Pulse:  [69-93] 74  Resp:  [16-23] 16  SpO2:  [94 %-99 %] 95 %  BP: (120-132)/(76-94) 120/81     Weight: 104.7 kg (230 lb 12.8 oz)  Body mass index is 39.62 kg/m².    Intake/Output Summary (Last 24 hours) at 11/7/2020 1526  Last data filed at 11/7/2020 1417  Gross per 24 hour   Intake 600 ml   Output --   Net 600 ml      Physical Exam    Physical Exam:  General- Patient alert and oriented x3 in NAD  HEENT- PERRLA, EOMI, OP clear, MMM  Neck- No JVD, Lymphadenopathy, Thyromegaly  CV- Regular rate and rhythm, No Murmur/rosa/rubs  Resp- Lungs CTA Bilaterally, No increased WOB  GI- Non tender/non-distended, BS normoactive x4 quads, no HSM  Extrem- No cyanosis, clubbing  Neuro- non focal   Skin-  No masses, rashes or lesions noted on cursory skin exam.  Overview/Hospital Course:     Significant Labs:   BMP:   Recent Labs   Lab 11/07/20  0428   *      K 3.8      CO2 25   BUN 9   CREATININE 0.5   CALCIUM 8.9   MG 2.2     CBC:   Recent Labs   Lab 11/06/20  1459 11/07/20  0428   WBC 5.37 3.51*   HGB 12.1 12.2   HCT 38.3 39.7    377*        Significant Imaging: I have reviewed all pertinent imaging results/findings within the past 24 hours.    Assessment/Plan:      Active Diagnoses:    Diagnosis Date Noted POA    PRINCIPAL PROBLEM:  Acute hypoxemic respiratory failure due to COVID-19 [J96.01] 11/06/2020 Yes    COVID-19 virus infection [U07.1]  Unknown    Pneumonia due to COVID-19 virus [U07.1, J12.89] 11/06/2020 Yes    Essential hypertension [I10] 09/08/2017 Yes    History of Pulmonary embolism, bilateral [I26.99] 09/08/2017 Yes      Problems Resolved During this Admission:     ASSESSMENT AND PLAN     Continue Remdesivir and plasma     Decadron and symptomatic Rx   Subcu Lovenox, Zinc, vitamins   Wean o2  Follow ID   Rapid response and may not give remdesivir full 5 doses   VTE Risk Mitigation (From admission, onward)         Ordered     enoxaparin injection 40 mg  Every 24 hours      11/06/20 1710     IP VTE HIGH RISK PATIENT  Once      11/06/20 1710     Place sequential compression device  Until discontinued      11/06/20 1710                   Darren Khoury MD  Department of Hospital Medicine   [facility]

## 2020-11-08 LAB
ALBUMIN SERPL BCP-MCNC: 4 G/DL (ref 3.5–5.2)
ALP SERPL-CCNC: 61 U/L (ref 55–135)
ALT SERPL W/O P-5'-P-CCNC: 49 U/L (ref 10–44)
ANION GAP SERPL CALC-SCNC: 12 MMOL/L (ref 8–16)
AST SERPL-CCNC: 40 U/L (ref 10–40)
BASOPHILS # BLD AUTO: 0.01 K/UL (ref 0–0.2)
BASOPHILS NFR BLD: 0.2 % (ref 0–1.9)
BILIRUB SERPL-MCNC: 0.6 MG/DL (ref 0.1–1)
BUN SERPL-MCNC: 12 MG/DL (ref 6–20)
CALCIUM SERPL-MCNC: 9.6 MG/DL (ref 8.7–10.5)
CHLORIDE SERPL-SCNC: 103 MMOL/L (ref 95–110)
CO2 SERPL-SCNC: 27 MMOL/L (ref 23–29)
CREAT SERPL-MCNC: 0.7 MG/DL (ref 0.5–1.4)
CRP SERPL-MCNC: 1.62 MG/DL
D DIMER PPP IA.FEU-MCNC: 0.46 UG/ML FEU
DIFFERENTIAL METHOD: ABNORMAL
EOSINOPHIL # BLD AUTO: 0 K/UL (ref 0–0.5)
EOSINOPHIL NFR BLD: 0 % (ref 0–8)
ERYTHROCYTE [DISTWIDTH] IN BLOOD BY AUTOMATED COUNT: 14.7 % (ref 11.5–14.5)
EST. GFR  (AFRICAN AMERICAN): >60 ML/MIN/1.73 M^2
EST. GFR  (NON AFRICAN AMERICAN): >60 ML/MIN/1.73 M^2
FERRITIN SERPL-MCNC: 38 NG/ML (ref 20–300)
GLUCOSE SERPL-MCNC: 98 MG/DL (ref 70–110)
HCT VFR BLD AUTO: 39.3 % (ref 37–48.5)
HGB BLD-MCNC: 11.9 G/DL (ref 12–16)
IMM GRANULOCYTES # BLD AUTO: 0.02 K/UL (ref 0–0.04)
IMM GRANULOCYTES NFR BLD AUTO: 0.4 % (ref 0–0.5)
LYMPHOCYTES # BLD AUTO: 2 K/UL (ref 1–4.8)
LYMPHOCYTES NFR BLD: 42.1 % (ref 18–48)
MAGNESIUM SERPL-MCNC: 2.3 MG/DL (ref 1.6–2.6)
MCH RBC QN AUTO: 26.6 PG (ref 27–31)
MCHC RBC AUTO-ENTMCNC: 30.3 G/DL (ref 32–36)
MCV RBC AUTO: 88 FL (ref 82–98)
MONOCYTES # BLD AUTO: 0.5 K/UL (ref 0.3–1)
MONOCYTES NFR BLD: 11.2 % (ref 4–15)
NEUTROPHILS # BLD AUTO: 2.2 K/UL (ref 1.8–7.7)
NEUTROPHILS NFR BLD: 46.1 % (ref 38–73)
NRBC BLD-RTO: 0 /100 WBC
PHOSPHATE SERPL-MCNC: 5.1 MG/DL (ref 2.7–4.5)
PLATELET # BLD AUTO: 396 K/UL (ref 150–350)
PMV BLD AUTO: 11 FL (ref 9.2–12.9)
POTASSIUM SERPL-SCNC: 4.1 MMOL/L (ref 3.5–5.1)
PROT SERPL-MCNC: 8.3 G/DL (ref 6–8.4)
RBC # BLD AUTO: 4.47 M/UL (ref 4–5.4)
SODIUM SERPL-SCNC: 142 MMOL/L (ref 136–145)
WBC # BLD AUTO: 4.73 K/UL (ref 3.9–12.7)

## 2020-11-08 PROCEDURE — 99231 PR SUBSEQUENT HOSPITAL CARE,LEVL I: ICD-10-PCS | Mod: ,,, | Performed by: INTERNAL MEDICINE

## 2020-11-08 PROCEDURE — 25000003 PHARM REV CODE 250: Performed by: HOSPITALIST

## 2020-11-08 PROCEDURE — 85379 FIBRIN DEGRADATION QUANT: CPT

## 2020-11-08 PROCEDURE — 12000002 HC ACUTE/MED SURGE SEMI-PRIVATE ROOM

## 2020-11-08 PROCEDURE — 86140 C-REACTIVE PROTEIN: CPT

## 2020-11-08 PROCEDURE — 85025 COMPLETE CBC W/AUTO DIFF WBC: CPT

## 2020-11-08 PROCEDURE — 83735 ASSAY OF MAGNESIUM: CPT

## 2020-11-08 PROCEDURE — 36415 COLL VENOUS BLD VENIPUNCTURE: CPT

## 2020-11-08 PROCEDURE — 94761 N-INVAS EAR/PLS OXIMETRY MLT: CPT

## 2020-11-08 PROCEDURE — 94640 AIRWAY INHALATION TREATMENT: CPT

## 2020-11-08 PROCEDURE — 63600175 PHARM REV CODE 636 W HCPCS: Performed by: HOSPITALIST

## 2020-11-08 PROCEDURE — 82728 ASSAY OF FERRITIN: CPT

## 2020-11-08 PROCEDURE — 99231 SBSQ HOSP IP/OBS SF/LOW 25: CPT | Mod: ,,, | Performed by: INTERNAL MEDICINE

## 2020-11-08 PROCEDURE — 80053 COMPREHEN METABOLIC PANEL: CPT

## 2020-11-08 PROCEDURE — 25000242 PHARM REV CODE 250 ALT 637 W/ HCPCS: Performed by: HOSPITALIST

## 2020-11-08 PROCEDURE — 99900035 HC TECH TIME PER 15 MIN (STAT)

## 2020-11-08 PROCEDURE — 27000221 HC OXYGEN, UP TO 24 HOURS

## 2020-11-08 PROCEDURE — 84100 ASSAY OF PHOSPHORUS: CPT

## 2020-11-08 RX ADMIN — ENOXAPARIN SODIUM 40 MG: 40 INJECTION SUBCUTANEOUS at 04:11

## 2020-11-08 RX ADMIN — AMLODIPINE BESYLATE 10 MG: 5 TABLET ORAL at 08:11

## 2020-11-08 RX ADMIN — DEXAMETHASONE 6 MG: 2 TABLET ORAL at 08:11

## 2020-11-08 RX ADMIN — GUAIFENESIN 1200 MG: 600 TABLET, EXTENDED RELEASE ORAL at 08:11

## 2020-11-08 RX ADMIN — HYDROCHLOROTHIAZIDE 12.5 MG: 12.5 TABLET ORAL at 08:11

## 2020-11-08 RX ADMIN — OXYCODONE HYDROCHLORIDE AND ACETAMINOPHEN 1000 MG: 500 TABLET ORAL at 08:11

## 2020-11-08 RX ADMIN — Medication 2000 UNITS: at 08:11

## 2020-11-08 RX ADMIN — ALBUTEROL SULFATE 2 PUFF: 90 AEROSOL, METERED RESPIRATORY (INHALATION) at 08:11

## 2020-11-08 RX ADMIN — IPRATROPIUM BROMIDE 2 PUFF: 17 AEROSOL, METERED RESPIRATORY (INHALATION) at 08:11

## 2020-11-08 RX ADMIN — IPRATROPIUM BROMIDE 2 PUFF: 17 AEROSOL, METERED RESPIRATORY (INHALATION) at 07:11

## 2020-11-08 RX ADMIN — ALBUTEROL SULFATE 2 PUFF: 90 AEROSOL, METERED RESPIRATORY (INHALATION) at 07:11

## 2020-11-08 RX ADMIN — ASPIRIN 81 MG: 81 TABLET, CHEWABLE ORAL at 08:11

## 2020-11-08 RX ADMIN — FAMOTIDINE 20 MG: 20 TABLET, FILM COATED ORAL at 08:11

## 2020-11-08 RX ADMIN — SENNOSIDES AND DOCUSATE SODIUM 1 TABLET: 8.6; 5 TABLET ORAL at 08:11

## 2020-11-08 RX ADMIN — REMDESIVIR 100 MG: 100 INJECTION, POWDER, LYOPHILIZED, FOR SOLUTION INTRAVENOUS at 08:11

## 2020-11-08 RX ADMIN — MELATONIN 9 MG: at 08:11

## 2020-11-08 RX ADMIN — VALSARTAN 160 MG: 80 TABLET ORAL at 08:11

## 2020-11-08 RX ADMIN — IPRATROPIUM BROMIDE 2 PUFF: 17 AEROSOL, METERED RESPIRATORY (INHALATION) at 01:11

## 2020-11-08 RX ADMIN — ALBUTEROL SULFATE 2 PUFF: 90 AEROSOL, METERED RESPIRATORY (INHALATION) at 01:11

## 2020-11-08 NOTE — CARE UPDATE
11/07/20 1733   Patient Assessment/Suction   Level of Consciousness (AVPU) alert   Respiratory Effort Normal;Unlabored   Expansion/Accessory Muscles/Retractions no use of accessory muscles;no retractions;expansion symmetric   All Lung Fields Breath Sounds equal bilaterally;diminished   Rhythm/Pattern, Respiratory unlabored;pattern regular;depth regular   Cough Frequency frequent   Cough Type dry;nonproductive   PRE-TX-O2   O2 Device (Oxygen Therapy) nasal cannula   $ Is the patient on Low Flow Oxygen? Yes   Flow (L/min) 1   SpO2 96 %   Pulse Oximetry Type Continuous   $ Pulse Oximetry - Multiple Charge Pulse Oximetry - Multiple   Pulse 69   Resp 17   Temp 98.2 °F (36.8 °C)   /77   Inhaler   $ Inhaler Charges MDI (Metered Dose Inahler) Treatment   Daily Review of Necessity (Inhaler) completed   Respiratory Treatment Status (Inhaler) given   Treatment Route (Inhaler) mouthpiece;spacer/holding chamber   Patient Position (Inhaler) semi-Snider's   Post Treatment Assessment (Inhaler) breath sounds unchanged;vital signs unchanged   Signs of Intolerance (Inhaler) none   Respiratory Evaluation   $ Care Plan Tech Time 15 min

## 2020-11-08 NOTE — PLAN OF CARE
11/08/20 1210   Discharge Assessment   Assessment Type Discharge Planning Assessment   Confirmed/corrected address and phone number on facesheet? Yes   Assessment information obtained from? Medical Record;Patient   Prior to hospitilization cognitive status: Alert/Oriented   Prior to hospitalization functional status: Independent   Current cognitive status: Alert/Oriented   Current Functional Status: Independent   Facility Arrived From: home   Lives With spouse   Able to Return to Prior Arrangements yes   Is patient able to care for self after discharge? Yes   Readmission Within the Last 30 Days no previous admission in last 30 days   Patient currently being followed by outpatient case management? No   Patient currently receives any other outside agency services? No   Equipment Currently Used at Home none   Part D Coverage none   Do you have any problems affording any of your prescribed medications? No   Is the patient taking medications as prescribed? yes   Does the patient have transportation home? Yes   Transportation Anticipated family or friend will provide;car, drives self   Dialysis Name and Scheduled days none   Does the patient receive services at the [facility]? No   Discharge Plan A Home   Discharge Plan B Home   DME Needed Upon Discharge  oxygen   Patient/Family in Agreement with Plan yes   Assessment done via telephone due to Covid Precautions.CM will continue to follow for further discharge planning needs.

## 2020-11-08 NOTE — PLAN OF CARE
11/08/20 0705   Patient Assessment/Suction   Level of Consciousness (AVPU) alert   Respiratory Effort Unlabored   All Lung Fields Breath Sounds diminished   Cough Type nonproductive   PRE-TX-O2   O2 Device (Oxygen Therapy) nasal cannula   $ Is the patient on Low Flow Oxygen? Yes   Flow (L/min) 1   SpO2 98 %   Pulse Oximetry Type Continuous   $ Pulse Oximetry - Multiple Charge Pulse Oximetry - Multiple   Pulse 66   Resp 18   Inhaler   $ Inhaler Charges MDI (Metered Dose Inahler) Treatment;Given With Spacer   Respiratory Treatment Status (Inhaler) given   Treatment Route (Inhaler) spacer/holding chamber   Patient Position (Inhaler) sitting in chair   Signs of Intolerance (Inhaler) none   Respiratory Evaluation   $ Care Plan Tech Time 15 min

## 2020-11-08 NOTE — PROGRESS NOTES
Progress Note  Infectious Disease    Reason for Consult:      HPI: Jalyn Dickson is a well-appearing 50 y.o. 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    Her  and patient were at surgical unit about a week ago.  Then both of them developed cough myalgias shortness of breath.  Her  has been hospitalized in MICU.  Patient tried to manage herself at home.  She took Zithromax for 3 days.  She monitored her  pulse ox.  When pulse ox dropped to 90 and she became short of breath at rest she decided to come to emergency room.  She was found to be as low as 81% on room air but improved fast with 3 L O2.  She was given remdesivir.  IS, Decadron, melatonin, guaifenesin, famotidine.  Id was called for CCP.  She felt feverish but does not know her temperature.  She has progressive cough, worse when she breathes in deep, cough was nonproductive initially then it became slightly yellow, she received a dose of ceftriaxone and Zithromax in emergency room.  Sputum culture has not been sent.  Procalcitonin is normal.    Decreased appetite is present.  Decreased in taste.  No diarrhea.   She feels much improved while wearing FiO2.  She was trying to lay on her belly but it was not easy to do.  She is able to sit up in a chair that for a found her today.      11/08/2020 afebrile.  T-max 98.4°.  She feels short of breath with walking.  She is concerned if she will was to be discharged today.  She would like to take advantage of 1 more dose of RDV        EXAM & DIAGNOSTICS REVIEWED:   Vitals:     Temp:  [97.7 °F (36.5 °C)-98.4 °F (36.9 °C)]   Temp: 98.4 °F (36.9 °C) (11/08/20 0757)  Pulse: 62 (11/08/20 0757)  Resp: 18 (11/08/20 0757)  BP: (!) 135/94 (11/08/20 0757)  SpO2: 97 % (11/08/20 0757)    Intake/Output Summary (Last 24 hours) at 11/8/2020 0924  Last data filed at 11/7/2020 1815  Gross per 24 hour   Intake 779 ml    Output --   Net 779 ml       General:  In NAD. Alert and attentive, cooperative, comfortable  Eyes:  Anicteric,   ENT:    Neck:  supple,   Lungs: Diminished at bases.  Faint crackles--improving   Heart:  RRR, no gallop/murmur/rub noted  Abd:  Obese  :  Voids ;  no flank tenderness  Musc:  Joints without effusion, swelling, erythema, synovitis, muscle wasting.   Skin:  No rashes. No palmar or plantar lesions. No subungual petechiae  Wound:   Neuro: Alert, attentive, speech fluent, face symmetric, moves all extremities, no focal weakness. Ambulatory  Psych:  Calm, cooperative  Extrem: No edema, erythema, phlebitis, cellulitis, warm and well perfused  VAD:       Isolation:      Lines/Tubes/Drains:    General Labs reviewed:  Recent Labs   Lab 11/06/20  1459 11/07/20 0428 11/08/20  0348   WBC 5.37 3.51* 4.73   HGB 12.1 12.2 11.9*   HCT 38.3 39.7 39.3    377* 396*       Recent Labs   Lab 11/06/20  1459 11/07/20 0428 11/08/20  0348    139 142   K 3.7 3.8 4.1   CL 99 103 103   CO2 25 25 27   BUN 7 9 12   CREATININE 0.6 0.5 0.7   CALCIUM 8.9 8.9 9.6   PROT 7.5 7.7 8.3   BILITOT 0.9 0.6 0.6   ALKPHOS 59 58 61   ALT 25 33 49*   AST 27 33 40     Micro:  Microbiology Results (last 7 days)     Procedure Component Value Units Date/Time    Culture, Respiratory with Gram Stain [[id number]] Collected: 11/07/20 2217    Order Status: Completed Specimen: Respiratory from Sputum Updated: 11/08/20 0829     Respiratory Culture Insufficient incubation, culture in progress    Blood culture #1 **CANNOT BE ORDERED STAT** [[id number]] Collected: 11/06/20 1540    Order Status: Completed Specimen: Blood from Peripheral, Antecubital, Right Updated: 11/07/20 1632     Blood Culture, Routine No Growth to date      No Growth to date    Blood culture #2 **CANNOT BE ORDERED STAT** [[id number]] Collected: 11/06/20 1555    Order Status: Completed Specimen: Blood from Peripheral, Antecubital, Right Updated: 11/07/20 1632     Blood Culture,  Routine No Growth to date      No Growth to date        Imaging Reviewed:  CXR1. Minimal left basilar atelectasis or infiltrate.. No other abnormalities.     Cardiology:    Sinus rhythm    IMPRESSION & PLAN     COVID illness, pneumonia  Hypoxemia due to above.  Obesity  History of PE.      Recommendations:  Continue RDV-- d3 today  S.p CCP  She is improving quick which is reassuring  Continue guaifenesin, Tessalon, Continue vitamin-C, vitamin-D, melatonin, famotidine, Decadron  Continue DVT prophylaxis.  Will monitor D-dimer closely, especially given her history of PE  Monitor markers of inflammation and daily labs.  Sputum culture in progress.    If she continues to improve I have no objections to discharge tomorrow morning    Medical Decision Making during this encounter was  [_] Low Complexity  [_] Moderate Complexity  [ x ] High Complexity

## 2020-11-08 NOTE — PLAN OF CARE
11/08/20 1328   Home Oxygen Qualification   Room Air SpO2 At Rest 98 %   Room Air SpO2 on Exertion 97 %

## 2020-11-08 NOTE — PROGRESS NOTES
[facility]  Progress Note    Patient Name: Jalyn Dickson  MRN: [id number]  Patient Class: IP- Inpatient   Admission Date: 11/6/2020  Length of Stay: 2 days  Attending Physician: Darren Khoury MD  Primary Care Provider: Rachel Wagner MD        Subjective:     Principal Problem:Acute hypoxemic respiratory failure    Interval History:     Patient is feeling better  Later was able to discontinue oxygen support.  But she was having a lot of shortness of breath and exertion  Review of Systems  Objective:     Vital Signs (Most Recent):  Temp: 97.8 °F (36.6 °C) (11/08/20 1100)  Pulse: 82 (11/08/20 1324)  Resp: 18 (11/08/20 1324)  BP: (!) 120/59 (11/08/20 1100)  SpO2: 96 % (11/08/20 1324) Vital Signs (24h Range):  Temp:  [97.7 °F (36.5 °C)-98.4 °F (36.9 °C)] 97.8 °F (36.6 °C)  Pulse:  [54-82] 82  Resp:  [16-18] 18  SpO2:  [94 %-98 %] 96 %  BP: (113-135)/(59-94) 120/59     Weight: 104.7 kg (230 lb 12.8 oz)  Body mass index is 39.62 kg/m².    Intake/Output Summary (Last 24 hours) at 11/8/2020 1514  Last data filed at 11/7/2020 1815  Gross per 24 hour   Intake 419 ml   Output --   Net 419 ml      Physical Exam    Physical Exam:  General- Patient alert and oriented x3 in NAD  HEENT- PERRLA, EOMI, OP clear, MMM  Neck- No JVD, Lymphadenopathy, Thyromegaly  CV- Regular rate and rhythm, No Murmur/rosa/rubs  Resp- Lungs CTA Bilaterally, No increased WOB  GI- Non tender/non-distended, BS normoactive x4 quads, no HSM  Extrem- No cyanosis, clubbing  Neuro- non focal   Skin-  No masses, rashes or lesions noted on cursory skin exam.  Overview/Hospital Course:     Significant Labs:   BMP:   Recent Labs   Lab 11/08/20  0348   GLU 98      K 4.1      CO2 27   BUN 12   CREATININE 0.7   CALCIUM 9.6   MG 2.3     CBC:   Recent Labs   Lab 11/07/20  0428 11/08/20  0348   WBC 3.51* 4.73   HGB 12.2 11.9*   HCT 39.7 39.3   * 396*       Significant Imaging: I have reviewed all pertinent  imaging results/findings within the past 24 hours.    Assessment/Plan:      Active Diagnoses:    Diagnosis Date Noted POA    PRINCIPAL PROBLEM:  Acute hypoxemic respiratory failure due to COVID-19 [J96.01] 11/06/2020 Yes    COVID-19 virus infection [U07.1]  Unknown    Pneumonia due to COVID-19 virus [U07.1, J12.89] 11/06/2020 Yes    Essential hypertension [I10] 09/08/2017 Yes    History of Pulmonary embolism, bilateral [I26.99] 09/08/2017 Yes      Problems Resolved During this Admission:     ASSESSMENT AND PLAN     Continue Remdesivir and s/p  plasma     Decadron and symptomatic Rx   Subcu Lovenox, Zinc, vitamins   Follow ID   Last dose of remdesivir tomorrow and possible DC .  VTE Risk Mitigation (From admission, onward)         Ordered     enoxaparin injection 40 mg  Every 24 hours      11/06/20 1710     IP VTE HIGH RISK PATIENT  Once      11/06/20 1710     Place sequential compression device  Until discontinued      11/06/20 1710                   Darren Khoury MD  Department of Hospital Medicine   [facility]

## 2020-11-09 VITALS
HEART RATE: 63 BPM | WEIGHT: 229.06 LBS | BODY MASS INDEX: 39.11 KG/M2 | TEMPERATURE: 99 F | OXYGEN SATURATION: 97 % | HEIGHT: 64 IN | SYSTOLIC BLOOD PRESSURE: 116 MMHG | RESPIRATION RATE: 19 BRPM | DIASTOLIC BLOOD PRESSURE: 70 MMHG

## 2020-11-09 LAB
ALBUMIN SERPL BCP-MCNC: 3.8 G/DL (ref 3.5–5.2)
ALP SERPL-CCNC: 56 U/L (ref 55–135)
ALT SERPL W/O P-5'-P-CCNC: 39 U/L (ref 10–44)
ANION GAP SERPL CALC-SCNC: 12 MMOL/L (ref 8–16)
AST SERPL-CCNC: 26 U/L (ref 10–40)
BASOPHILS # BLD AUTO: 0.01 K/UL (ref 0–0.2)
BASOPHILS NFR BLD: 0.2 % (ref 0–1.9)
BILIRUB SERPL-MCNC: 0.6 MG/DL (ref 0.1–1)
BUN SERPL-MCNC: 11 MG/DL (ref 6–20)
CALCIUM SERPL-MCNC: 9.2 MG/DL (ref 8.7–10.5)
CHLORIDE SERPL-SCNC: 102 MMOL/L (ref 95–110)
CO2 SERPL-SCNC: 24 MMOL/L (ref 23–29)
CREAT SERPL-MCNC: 0.6 MG/DL (ref 0.5–1.4)
DIFFERENTIAL METHOD: ABNORMAL
EOSINOPHIL # BLD AUTO: 0 K/UL (ref 0–0.5)
EOSINOPHIL NFR BLD: 0 % (ref 0–8)
ERYTHROCYTE [DISTWIDTH] IN BLOOD BY AUTOMATED COUNT: 14.6 % (ref 11.5–14.5)
EST. GFR  (AFRICAN AMERICAN): >60 ML/MIN/1.73 M^2
EST. GFR  (NON AFRICAN AMERICAN): >60 ML/MIN/1.73 M^2
FERRITIN SERPL-MCNC: 37 NG/ML (ref 20–300)
GLUCOSE SERPL-MCNC: 90 MG/DL (ref 70–110)
HCT VFR BLD AUTO: 38.7 % (ref 37–48.5)
HGB BLD-MCNC: 11.9 G/DL (ref 12–16)
IMM GRANULOCYTES # BLD AUTO: 0.03 K/UL (ref 0–0.04)
IMM GRANULOCYTES NFR BLD AUTO: 0.5 % (ref 0–0.5)
LYMPHOCYTES # BLD AUTO: 2.5 K/UL (ref 1–4.8)
LYMPHOCYTES NFR BLD: 43.1 % (ref 18–48)
MAGNESIUM SERPL-MCNC: 2.1 MG/DL (ref 1.6–2.6)
MCH RBC QN AUTO: 26.3 PG (ref 27–31)
MCHC RBC AUTO-ENTMCNC: 30.7 G/DL (ref 32–36)
MCV RBC AUTO: 86 FL (ref 82–98)
MONOCYTES # BLD AUTO: 0.5 K/UL (ref 0.3–1)
MONOCYTES NFR BLD: 8.9 % (ref 4–15)
NEUTROPHILS # BLD AUTO: 2.8 K/UL (ref 1.8–7.7)
NEUTROPHILS NFR BLD: 47.3 % (ref 38–73)
NRBC BLD-RTO: 0 /100 WBC
PHOSPHATE SERPL-MCNC: 4.6 MG/DL (ref 2.7–4.5)
PLATELET # BLD AUTO: 435 K/UL (ref 150–350)
PMV BLD AUTO: 10.4 FL (ref 9.2–12.9)
POTASSIUM SERPL-SCNC: 3.5 MMOL/L (ref 3.5–5.1)
PROT SERPL-MCNC: 7.8 G/DL (ref 6–8.4)
RBC # BLD AUTO: 4.52 M/UL (ref 4–5.4)
SODIUM SERPL-SCNC: 138 MMOL/L (ref 136–145)
WBC # BLD AUTO: 5.87 K/UL (ref 3.9–12.7)

## 2020-11-09 PROCEDURE — 85025 COMPLETE CBC W/AUTO DIFF WBC: CPT

## 2020-11-09 PROCEDURE — 25000242 PHARM REV CODE 250 ALT 637 W/ HCPCS: Performed by: HOSPITALIST

## 2020-11-09 PROCEDURE — 83735 ASSAY OF MAGNESIUM: CPT

## 2020-11-09 PROCEDURE — 80053 COMPREHEN METABOLIC PANEL: CPT

## 2020-11-09 PROCEDURE — 99900035 HC TECH TIME PER 15 MIN (STAT)

## 2020-11-09 PROCEDURE — 36415 COLL VENOUS BLD VENIPUNCTURE: CPT

## 2020-11-09 PROCEDURE — 94761 N-INVAS EAR/PLS OXIMETRY MLT: CPT

## 2020-11-09 PROCEDURE — 25000003 PHARM REV CODE 250: Performed by: HOSPITALIST

## 2020-11-09 PROCEDURE — 94640 AIRWAY INHALATION TREATMENT: CPT

## 2020-11-09 PROCEDURE — 84100 ASSAY OF PHOSPHORUS: CPT

## 2020-11-09 PROCEDURE — 27000221 HC OXYGEN, UP TO 24 HOURS

## 2020-11-09 PROCEDURE — 82728 ASSAY OF FERRITIN: CPT

## 2020-11-09 PROCEDURE — 63600175 PHARM REV CODE 636 W HCPCS: Performed by: HOSPITALIST

## 2020-11-09 RX ORDER — DEXAMETHASONE 6 MG/1
6 TABLET ORAL DAILY
Qty: 7 TABLET | Refills: 0 | Status: SHIPPED | OUTPATIENT
Start: 2020-11-10 | End: 2020-11-20

## 2020-11-09 RX ORDER — DEXAMETHASONE 6 MG/1
6 TABLET ORAL DAILY
Qty: 7 TABLET | Refills: 0 | Status: SHIPPED | OUTPATIENT
Start: 2020-11-10 | End: 2020-11-09

## 2020-11-09 RX ORDER — ALBUTEROL SULFATE 90 UG/1
2 AEROSOL, METERED RESPIRATORY (INHALATION) 2 TIMES DAILY
Status: DISCONTINUED | OUTPATIENT
Start: 2020-11-09 | End: 2020-11-09 | Stop reason: HOSPADM

## 2020-11-09 RX ADMIN — OXYCODONE HYDROCHLORIDE AND ACETAMINOPHEN 1000 MG: 500 TABLET ORAL at 09:11

## 2020-11-09 RX ADMIN — POTASSIUM CHLORIDE 40 MEQ: 20 TABLET, EXTENDED RELEASE ORAL at 09:11

## 2020-11-09 RX ADMIN — Medication 2000 UNITS: at 09:11

## 2020-11-09 RX ADMIN — VALSARTAN 160 MG: 80 TABLET ORAL at 09:11

## 2020-11-09 RX ADMIN — HYDROCHLOROTHIAZIDE 12.5 MG: 12.5 TABLET ORAL at 09:11

## 2020-11-09 RX ADMIN — IPRATROPIUM BROMIDE 2 PUFF: 17 AEROSOL, METERED RESPIRATORY (INHALATION) at 07:11

## 2020-11-09 RX ADMIN — AMLODIPINE BESYLATE 10 MG: 5 TABLET ORAL at 09:11

## 2020-11-09 RX ADMIN — DEXAMETHASONE 6 MG: 2 TABLET ORAL at 09:11

## 2020-11-09 RX ADMIN — ALBUTEROL SULFATE 2 PUFF: 90 AEROSOL, METERED RESPIRATORY (INHALATION) at 07:11

## 2020-11-09 RX ADMIN — SENNOSIDES AND DOCUSATE SODIUM 1 TABLET: 8.6; 5 TABLET ORAL at 09:11

## 2020-11-09 RX ADMIN — FAMOTIDINE 20 MG: 20 TABLET, FILM COATED ORAL at 09:11

## 2020-11-09 RX ADMIN — ASPIRIN 81 MG: 81 TABLET, CHEWABLE ORAL at 09:11

## 2020-11-09 RX ADMIN — GUAIFENESIN 1200 MG: 600 TABLET, EXTENDED RELEASE ORAL at 09:11

## 2020-11-09 NOTE — RESPIRATORY THERAPY
11/09/20 1127   Home Oxygen Qualification   Room Air SpO2 At Rest 98 %   Room Air SpO2 on Exertion 97 %   SpO2 During Exertion on O2 95 %   Heart Rate on O2 70 bpm   SpO2 on Recovery 98 %   Recovery Heart Rate 71 bpm   Home O2 Eval Comments PATIENT DOES NOT QUALIFY FOR HOME O2

## 2020-11-09 NOTE — HOSPITAL COURSE
Patient was admitted with COVID infection.  She had desaturation about 88% at the admission and given remdesivir and convalescent plasma and she quickly improved.  Her inflammatory markers are totally negative and she was able to wean oxygen.  6 minutes walk test was done and she did not qualify for home  Oxygen.  Remdisivir  was stopped after 3rd dose which I discussed with infectious disease specialist and patient was discharged in stable condition.  Self quarantine advise was given

## 2020-11-09 NOTE — DISCHARGE SUMMARY
[facility]  Discharge Summary      Patient Name: Jalyn Dickson  MRN: [id number]  Admission Date: 11/6/2020  Hospital Length of Stay: 3 days  Discharge Date and Time:  11/09/2020 5:30 PM  Attending Physician: No att. providers found   Discharging Provider: Darren Khoury MD  Primary Care Provider: Rachel Wagner MD      HPI:   50-year-old lady with past medical history of hypertension, pulmonary embolism in the past requiring anticoagulation came with chief complaint of shortness of breath and fever.  Upon further asking patient mentioned that she was in her usual state of health until about a week ago when she started experiencing dry cough, severe myalgia and her  was diagnosed with COVID-19 who is currently admitted in [facility].  Progressively her symptoms got worse and she was diagnosed with COVID-19 on November 2nd at urgent care.  She was monitoring her oxygen saturation today she noticed her SpO2 of 90% and she also started experiencing high-grade fever and decided to come to ED.  As per ED provider upon arrival her oxygen saturation was 81% on room air that quickly improved with 3 L oxygen via nasal cannula.  Otherwise she also reports fever, chills, severe myalgia, shortness of breath, cough and loss of taste.  Denies any headache, dizziness, chest pain, palpitations, nausea, vomiting, bladder or bowel symptoms.    * No surgery found *      Hospital Course:   Patient was admitted with COVID infection.  She had desaturation about 88% at the admission and given remdesivir and convalescent plasma and she quickly improved.  Her inflammatory markers are totally negative and she was able to wean oxygen.  6 minutes walk test was done and she did not qualify for home  Oxygen.  Remdisivir  was stopped after 3rd dose which I discussed with infectious disease specialist and patient was discharged in stable condition.  Self quarantine advise was given     Consults:   Consults (From  admission, onward)        Status Ordering Provider     Inpatient consult to Hospitalist  Once     Provider:  Tod Cobos MD    Acknowledged TOD COBOS     Inpatient consult to Infectious Diseases  Once     Provider:  Shanell Telles MD    Completed TOD COBOS          No new Assessment & Plan notes have been filed under this hospital service since the last note was generated.  Service: Hospital Medicine    Final Active Diagnoses:    Diagnosis Date Noted POA    PRINCIPAL PROBLEM:  Acute hypoxemic respiratory failure due to COVID-19 [J96.01] 11/06/2020 Yes    COVID-19 virus infection [U07.1]  Unknown    Pneumonia due to COVID-19 virus [U07.1, J12.89] 11/06/2020 Yes    Essential hypertension [I10] 09/08/2017 Yes    History of Pulmonary embolism, bilateral [I26.99] 09/08/2017 Yes      Problems Resolved During this Admission:       Discharged Condition: good    Disposition: Home or Self Care    Follow Up:  Follow-up Information     Rachel Wagner MD In 1 week.    Specialty: Internal Medicine  Contact information:  [address] [place] [address] [id number] [phone]             Follow up In 1 week.               Patient Instructions:      Diet Cardiac     Notify your health care provider if you experience any of the following:  severe uncontrolled pain     Activity as tolerated       Significant Diagnostic Studies: Labs:   CMP   Recent Labs   Lab 11/08/20  0348 11/09/20  0515    138   K 4.1 3.5    102   CO2 27 24   GLU 98 90   BUN 12 11   CREATININE 0.7 0.6   CALCIUM 9.6 9.2   PROT 8.3 7.8   ALBUMIN 4.0 3.8   BILITOT 0.6 0.6   ALKPHOS 61 56   AST 40 26   ALT 49* 39   ANIONGAP 12 12   ESTGFRAFRICA >60.0 >60.0   EGFRNONAA >60.0 >60.0   , CBC   Recent Labs   Lab 11/08/20  0348 11/09/20  0515   WBC 4.73 5.87   HGB 11.9* 11.9*   HCT 39.3 38.7   * 435*    and INR   Lab Results   Component Value Date    INR 1.0 09/09/2017       Pending Diagnostic Studies:     None          Medications:  Reconciled Home Medications:      Medication List      START taking these medications    dexAMETHasone 6 MG tablet  Commonly known as: DECADRON  Take 1 tablet (6 mg total) by mouth once daily. for 10 days  Start taking on: November 10, 2020        CONTINUE taking these medications    AFLURIA QD 2020-21(3YR UP)(PF) 60 mcg (15 mcg x 4)/0.5 mL Syrg  Generic drug: flu vac [id number] 36mos up(PF)  ADM 0.5ML IM UTD     amLODIPine-valsartan-hcthiazid -12.5 mg Tab  Take 1 tablet by mouth once daily.     aspirin 81 MG Chew  Take 81 mg by mouth once daily.     b complex vitamins tablet  Take 1 tablet by mouth once daily.     cyanocobalamin 1000 MCG tablet  Commonly known as: VITAMIN B-12  Take 100 mcg by mouth once daily.     ferrous sulfate 325 mg (65 mg iron) Tab tablet  Commonly known as: FEOSOL  Take 325 mg by mouth once daily.     GENERLAC 10 gram/15 mL solution  Generic drug: lactulose  Take 10 g by mouth 2 (two) times daily.     guaiFENesin 600 mg 12 hr tablet  Commonly known as: MUCINEX  Take 1,200 mg by mouth 2 (two) times daily.     levocetirizine 5 MG tablet  Commonly known as: XYZAL  Take 5 mg by mouth every evening.     LORazepam 0.5 MG tablet  Commonly known as: ATIVAN  Take 1 tablet (0.5 mg total) by mouth daily as needed for Anxiety.     NASONEX 50 mcg/actuation nasal spray  Generic drug: mometasone  Nasonex 50 mcg/actuation Spray     omeprazole 20 MG capsule  Commonly known as: PRILOSEC  Take 20 mg by mouth once daily.     PROAIR HFA 90 mcg/actuation inhaler  Generic drug: albuterol  Inhale 1 puff into the lungs every 4 (four) hours as needed.     promethazine-codeine 6.25-10 mg/5 ml 6.25-10 mg/5 mL syrup  Commonly known as: PHENERGAN with CODEINE  Take 5 mLs by mouth every 6 (six) hours as needed.     valACYclovir 500 MG tablet  Commonly known as: VALTREX  Take 500 mg by mouth once daily.     VITAMIN C 1000 MG tablet  Generic drug: ascorbic acid (vitamin C)  Take 1,000 mg by mouth once  daily.     vitamin E 400 UNIT capsule  Take 400 Units by mouth once daily.        STOP taking these medications    amLODIPine 10 MG tablet  Commonly known as: NORVASC     amLODIPine 5 MG tablet  Commonly known as: NORVASC     DIOVAN 80 MG tablet  Generic drug: valsartan     hydroCHLOROthiazide 12.5 mg capsule  Commonly known as: MICROZIDE     losartan 50 MG tablet  Commonly known as: COZAAR            Indwelling Lines/Drains at time of discharge:   Lines/Drains/Airways     None                 Time spent on the discharge of patient: 26 minutes  Patient was seen and examined on the date of discharge and determined to be suitable for discharge.         Darren Khoury MD  Department of Hospital Medicine  [facility]

## 2020-11-09 NOTE — PLAN OF CARE
11/09/20 0728   Patient Assessment/Suction   Level of Consciousness (AVPU) alert   Respiratory Effort Normal;Unlabored   Expansion/Accessory Muscles/Retractions expansion symmetric;no retractions;no use of accessory muscles   All Lung Fields Breath Sounds diminished;equal bilaterally   PRE-TX-O2   O2 Device (Oxygen Therapy) room air   $ Is the patient on Low Flow Oxygen? Yes   SpO2 (!) 94 %   Pulse Oximetry Type Continuous   $ Pulse Oximetry - Multiple Charge Pulse Oximetry - Multiple   Pulse 60   Resp 20   Inhaler   $ Inhaler Charges MDI (Metered Dose Inahler) Treatment;Given With Spacer   Daily Review of Necessity (Inhaler) completed   Respiratory Treatment Status (Inhaler) given   Treatment Route (Inhaler) mouthpiece;spacer/holding chamber   Patient Position (Inhaler) sitting in chair   Post Treatment Assessment (Inhaler) breath sounds unchanged   Signs of Intolerance (Inhaler) none   Respiratory Evaluation   $ Care Plan Tech Time 15 min

## 2020-11-09 NOTE — CARE UPDATE
11/08/20 2033   Patient Assessment/Suction   Level of Consciousness (AVPU) alert   Respiratory Effort Normal;Unlabored   Expansion/Accessory Muscles/Retractions no use of accessory muscles;no retractions;expansion symmetric   All Lung Fields Breath Sounds equal bilaterally;diminished   Rhythm/Pattern, Respiratory unlabored;pattern regular;depth regular   Cough Frequency frequent   Cough Type dry;nonproductive   PRE-TX-O2   O2 Device (Oxygen Therapy) room air   SpO2 95 %   Pulse Oximetry Type Continuous   $ Pulse Oximetry - Multiple Charge Pulse Oximetry - Multiple   Pulse 62   Resp 18   Inhaler   $ Inhaler Charges MDI (Metered Dose Inahler) Treatment   Daily Review of Necessity (Inhaler) completed   Respiratory Treatment Status (Inhaler) given   Treatment Route (Inhaler) mouthpiece;spacer/holding chamber   Patient Position (Inhaler) sitting in chair   Post Treatment Assessment (Inhaler) breath sounds unchanged;vital signs unchanged   Signs of Intolerance (Inhaler) none   Respiratory Evaluation   $ Care Plan Tech Time 15 min

## 2020-11-09 NOTE — NURSING
Pt verbalized understanding of discharge education and instructions, pt made aware of prescriptions sent to pharamcy. Tele box returned to monitor tech. Pt wheeled off unit with no signs of distress

## 2020-11-09 NOTE — NURSING
Pt's IV infiltrated after pt received last dose of remdeivir. She refuses placement of another unless there is urgent need for such as she is going home in the morning.   Lizette Harper

## 2020-11-09 NOTE — PLAN OF CARE
11/09/20 1320   Final Note   Assessment Type Final Discharge Note   Anticipated Discharge Disposition Home   Post-Acute Status   Post-Acute Authorization Other   Part D Coverage [employer]   Other Status No Post-Acute Service Needs

## 2020-11-10 ENCOUNTER — TELEPHONE (OUTPATIENT)
Dept: FAMILY MEDICINE | Facility: CLINIC | Age: 50
End: 2020-11-10

## 2020-11-10 LAB
BACTERIA SPEC AEROBE CULT: NORMAL
GRAM STN SPEC: NORMAL

## 2020-11-10 NOTE — TELEPHONE ENCOUNTER
DC Date 11/9/20  Reached out to patient to schedule HFU.  HFU appointment scheduled 11/11/20 (Virtual).  C3 not notified patient scheduled for tomorrow

## 2020-11-11 ENCOUNTER — OFFICE VISIT (OUTPATIENT)
Dept: FAMILY MEDICINE | Facility: CLINIC | Age: 50
End: 2020-11-11
Payer: COMMERCIAL

## 2020-11-11 DIAGNOSIS — I10 ESSENTIAL HYPERTENSION: ICD-10-CM

## 2020-11-11 DIAGNOSIS — U07.1 COVID-19: Primary | ICD-10-CM

## 2020-11-11 DIAGNOSIS — F41.9 ANXIETY: ICD-10-CM

## 2020-11-11 LAB
BACTERIA BLD CULT: NORMAL
BACTERIA BLD CULT: NORMAL

## 2020-11-11 PROCEDURE — 1111F DSCHRG MED/CURRENT MED MERGE: CPT | Mod: ,,, | Performed by: INTERNAL MEDICINE

## 2020-11-11 PROCEDURE — 1111F PR DISCHARGE MEDS RECONCILED W/ CURRENT OUTPATIENT MED LIST: ICD-10-PCS | Mod: ,,, | Performed by: INTERNAL MEDICINE

## 2020-11-11 PROCEDURE — 99214 PR OFFICE/OUTPT VISIT, EST, LEVL IV, 30-39 MIN: ICD-10-PCS | Mod: 95,,, | Performed by: INTERNAL MEDICINE

## 2020-11-11 PROCEDURE — 99214 OFFICE O/P EST MOD 30 MIN: CPT | Mod: 95,,, | Performed by: INTERNAL MEDICINE

## 2020-11-11 RX ORDER — AMLODIPINE, VALSARTAN AND HYDROCHLOROTHIAZIDE 10; 160; 12.5 MG/1; MG/1; MG/1
1 TABLET ORAL DAILY
Qty: 90 TABLET | Refills: 3 | Status: SHIPPED | OUTPATIENT
Start: 2020-11-11 | End: 2020-12-03

## 2020-11-11 RX ORDER — DILTIAZEM HYDROCHLORIDE 30 MG/1
TABLET, FILM COATED ORAL
Qty: 40 TABLET | Refills: 1 | Status: SHIPPED | OUTPATIENT
Start: 2020-11-11 | End: 2020-12-03

## 2020-11-11 RX ORDER — LORAZEPAM 0.5 MG/1
0.5 TABLET ORAL DAILY PRN
Qty: 90 TABLET | Refills: 0 | Status: SHIPPED | OUTPATIENT
Start: 2020-11-11 | End: 2021-02-25 | Stop reason: SDUPTHER

## 2020-11-11 NOTE — PROGRESS NOTES
Subjective:        The chief complaint leading to consultation is: post COVID  The patient location is:  Home  Visit type: Virtual visit with synchronous audio/video or audio only  This was a video visit in lieu of in-person visit due to the coronavirus emergency. Patient acknowledged and consented to the video visit encounter.     HPI       HPI:   50-year-old lady with past medical history of hypertension, pulmonary embolism in the past requiring anticoagulation came with chief complaint of shortness of breath and fever.  Upon further asking patient mentioned that she was in her usual state of health until about a week ago when she started experiencing dry cough, severe myalgia and her  was diagnosed with COVID-19 who is currently admitted in [facility].  Progressively her symptoms got worse and she was diagnosed with COVID-19 on November 2nd at urgent care.  She was monitoring her oxygen saturation today she noticed her SpO2 of 90% and she also started experiencing high-grade fever and decided to come to ED.  As per ED provider upon arrival her oxygen saturation was 81% on room air that quickly improved with 3 L oxygen via nasal cannula.  Otherwise she also reports fever, chills, severe myalgia, shortness of breath, cough and loss of taste.  Denies any headache, dizziness, chest pain, palpitations, nausea, vomiting, bladder or bowel symptoms.       Hospital Course:   Patient was admitted with COVID infection.  She had desaturation about 88% at the admission and given remdesivir and convalescent plasma and she quickly improved.  Her inflammatory markers are totally negative and she was able to wean oxygen.  6 minutes walk test was done and she did not qualify for home  Oxygen.  Remdisivir  was stopped after 3rd dose which I discussed with infectious disease specialist and patient was discharged in stable condition.  Self quarantine advise was given        Medications:  Reconciled Home Medications:       Medication List       START taking these medications    dexAMETHasone 6 MG tablet  Commonly known as: DECADRON  Take 1 tablet (6 mg total) by mouth once daily. for 10 days  Start taking on: November 10, 2020          CONTINUE taking these medications    AFLURIA QD 2020-21(3YR UP)(PF) 60 mcg (15 mcg x 4)/0.5 mL Syrg  Generic drug: flu vac [id number] 36mos up(PF)  ADM 0.5ML IM UTD      amLODIPine-valsartan-hcthiazid -12.5 mg Tab  Take 1 tablet by mouth once daily.      aspirin 81 MG Chew  Take 81 mg by mouth once daily.      b complex vitamins tablet  Take 1 tablet by mouth once daily.      cyanocobalamin 1000 MCG tablet  Commonly known as: VITAMIN B-12  Take 100 mcg by mouth once daily.      ferrous sulfate 325 mg (65 mg iron) Tab tablet  Commonly known as: FEOSOL  Take 325 mg by mouth once daily.      GENERLAC 10 gram/15 mL solution  Generic drug: lactulose  Take 10 g by mouth 2 (two) times daily.      guaiFENesin 600 mg 12 hr tablet  Commonly known as: MUCINEX  Take 1,200 mg by mouth 2 (two) times daily.      levocetirizine 5 MG tablet  Commonly known as: XYZAL  Take 5 mg by mouth every evening.      LORazepam 0.5 MG tablet  Commonly known as: ATIVAN  Take 1 tablet (0.5 mg total) by mouth daily as needed for Anxiety.      NASONEX 50 mcg/actuation nasal spray  Generic drug: mometasone  Nasonex 50 mcg/actuation Spray      omeprazole 20 MG capsule  Commonly known as: PRILOSEC  Take 20 mg by mouth once daily.      PROAIR HFA 90 mcg/actuation inhaler  Generic drug: albuterol  Inhale 1 puff into the lungs every 4 (four) hours as needed.      promethazine-codeine 6.25-10 mg/5 ml 6.25-10 mg/5 mL syrup  Commonly known as: PHENERGAN with CODEINE  Take 5 mLs by mouth every 6 (six) hours as needed.      valACYclovir 500 MG tablet  Commonly known as: VALTREX  Take 500 mg by mouth once daily.      VITAMIN C 1000 MG tablet  Generic drug: ascorbic acid (vitamin C)  Take 1,000 mg by mouth once daily.      vitamin E 400 UNIT  capsule  Take 400 Units by mouth once daily.          - amlodipine 10-  [id number] -HCTZ--12.5                              Patient states post discharge she has up and down days-today nauseated and dizzy and /101-in the hospital her HR dropped on remdesivir-usual /70-now BP is higher and she is on  She is not having any shortness of breath-she has no chest pain-she has no fever-slight constipation-decreased appetite-taste and smell returning-she had no rash-    No results displayed because visit has over 200 results.      Office Visit on 11/02/2020   Component Date Value Ref Range Status    POC Rapid COVID 11/02/2020 Positive* Negative Final     Acceptable 11/02/2020 Yes   Final       Past Surgical History:   Procedure Laterality Date    NECK LESION BIOPSY      neck mass removal      TUBAL LIGATION  1999     Past Medical History:   Diagnosis Date    Acid reflux     Allergy     Anxiety     Hypertension     Neck mass     Pulmonary embolism     Sinus drainage      Family History   Problem Relation Age of Onset    Heart disease Mother     Coronary artery disease Father     Peripheral vascular disease Father     Diabetes Father     Hypertension Father     Hypertension Maternal Grandmother     No Known Problems Maternal Grandfather     PDD Paternal Grandfather     Hypertension Paternal Grandfather     No Known Problems Son         Social History:   Marital Status:   Alcohol History:  reports current alcohol use of about 2.0 standard drinks of alcohol per week.  Tobacco History:  reports that she has never smoked. She has never used smokeless tobacco.  Drug History:  reports current drug use.    Review of patient's allergies indicates:  No Known Allergies    Current Outpatient Medications   Medication Sig Dispense Refill    AFLURIA QD 2020-21,3YR UP,,PF, 60 mcg (15 mcg x 4)/0.5 mL Syrg ADM 0.5ML IM UTD      albuterol (PROAIR HFA) 90 mcg/actuation inhaler  Inhale 1 puff into the lungs every 4 (four) hours as needed.       amLODIPine-valsartan-hcthiazid -12.5 mg Tab Take 1 tablet by mouth once daily. 90 tablet 3    ascorbic acid, vitamin C, (VITAMIN C) 1000 MG tablet Take 1,000 mg by mouth once daily.      aspirin 81 MG Chew Take 81 mg by mouth once daily.      b complex vitamins tablet Take 1 tablet by mouth once daily.      cyanocobalamin (VITAMIN B-12) 1000 MCG tablet Take 100 mcg by mouth once daily.      dexAMETHasone (DECADRON) 6 MG tablet Take 1 tablet (6 mg total) by mouth once daily. for 10 days 7 tablet 0    ferrous sulfate (FEOSOL) 325 mg (65 mg iron) Tab tablet Take 325 mg by mouth once daily.       GENERLAC 10 gram/15 mL solution Take 10 g by mouth 2 (two) times daily.       guaiFENesin (MUCINEX) 600 mg 12 hr tablet Take 1,200 mg by mouth 2 (two) times daily.      levocetirizine (XYZAL) 5 MG tablet Take 5 mg by mouth every evening.      LORazepam (ATIVAN) 0.5 MG tablet Take 1 tablet (0.5 mg total) by mouth daily as needed for Anxiety. 30 tablet 2    mometasone (NASONEX) 50 mcg/actuation nasal spray Nasonex 50 mcg/actuation Spray      omeprazole (PRILOSEC) 20 MG capsule Take 20 mg by mouth once daily.      promethazine-codeine 6.25-10 mg/5 ml (PHENERGAN WITH CODEINE) 6.25-10 mg/5 mL syrup Take 5 mLs by mouth every 6 (six) hours as needed.       valACYclovir (VALTREX) 500 MG tablet Take 500 mg by mouth once daily.       vitamin E 400 UNIT capsule Take 400 Units by mouth once daily.       No current facility-administered medications for this visit.        Review of Systems   Constitutional: Positive for activity change and unexpected weight change (10 pounds). Negative for appetite change, chills, diaphoresis, fatigue and fever.   HENT: Negative for congestion, ear pain, hearing loss, nosebleeds, postnasal drip, rhinorrhea, sinus pressure, sinus pain, sneezing, sore throat, tinnitus, trouble swallowing and voice change.    Eyes: Negative  for photophobia, pain, discharge, itching and visual disturbance.   Respiratory: Positive for wheezing (at night). Negative for apnea, cough, chest tightness, shortness of breath and stridor.    Cardiovascular: Negative for chest pain, palpitations and leg swelling.   Gastrointestinal: Positive for constipation. Negative for abdominal distention, abdominal pain, blood in stool, diarrhea, nausea and vomiting.   Endocrine: Negative for cold intolerance, heat intolerance, polydipsia and polyuria.   Genitourinary: Negative for difficulty urinating, dyspareunia, dysuria, flank pain, frequency, hematuria, menstrual problem, pelvic pain, urgency, vaginal discharge and vaginal pain.   Musculoskeletal: Negative for arthralgias, back pain, joint swelling, myalgias, neck pain and neck stiffness.   Skin: Negative for pallor.   Allergic/Immunologic: Negative for environmental allergies and food allergies.   Neurological: Positive for weakness. Negative for dizziness, tremors, speech difficulty, light-headedness, numbness and headaches.   Hematological: Does not bruise/bleed easily.   Psychiatric/Behavioral: Positive for dysphoric mood (situational). Negative for agitation, confusion, decreased concentration, sleep disturbance and suicidal ideas. The patient is not nervous/anxious.          Objective:          Physical Exam         Assessment:       1. COVID-19    2. Essential hypertension    3. Anxiety      Plan:   COVID-19    Essential hypertension        -     Add Cardizem 30 mg bid if /90 or above    Anxiety        -     Lorazepam .5 mg daily as needed no 90      No follow-ups on file.    Total time spent with patient: 21 minutes    Each patient to whom he or she provides medical services by telemedicine is:  (1) informed of the relationship between the physician and patient and the respective role of any other health care provider with respect to management of the patient; and (2) notified that he or she may decline to  receive medical services by telemedicine and may withdraw from such care at any time.    This note was created using [employer] voice recognition software that occasionally misinterprets phrases or words.

## 2020-11-13 NOTE — PLAN OF CARE
11/13/20 1616   Discharge Assessment   Assessment Type Discharge Planning Reassessment     [facility] called this CM about a pt that dc on Mon at 530pm 4 days ago about  the decadron order for 10 days but the quantity says 7 days, and explained that Dr Khoury, Hopsitalist, is not here till next week and will need to contact the pts PCP.

## 2020-11-18 ENCOUNTER — OFFICE VISIT (OUTPATIENT)
Dept: URGENT CARE | Facility: CLINIC | Age: 50
End: 2020-11-18
Payer: COMMERCIAL

## 2020-11-18 VITALS
RESPIRATION RATE: 16 BRPM | TEMPERATURE: 98 F | HEART RATE: 93 BPM | DIASTOLIC BLOOD PRESSURE: 76 MMHG | OXYGEN SATURATION: 98 % | SYSTOLIC BLOOD PRESSURE: 112 MMHG

## 2020-11-18 DIAGNOSIS — Z20.822 EXPOSURE TO COVID-19 VIRUS: Primary | ICD-10-CM

## 2020-11-18 LAB
CTP QC/QA: YES
SARS-COV-2 RDRP RESP QL NAA+PROBE: POSITIVE

## 2020-11-18 PROCEDURE — 99213 OFFICE O/P EST LOW 20 MIN: CPT | Mod: S$GLB,CS,, | Performed by: EMERGENCY MEDICINE

## 2020-11-18 PROCEDURE — U0002 COVID-19 LAB TEST NON-CDC: HCPCS | Mod: QW,S$GLB,, | Performed by: EMERGENCY MEDICINE

## 2020-11-18 PROCEDURE — 99213 PR OFFICE/OUTPT VISIT, EST, LEVL III, 20-29 MIN: ICD-10-PCS | Mod: S$GLB,CS,, | Performed by: EMERGENCY MEDICINE

## 2020-11-18 PROCEDURE — U0002 PR SARS-COV-2 COVID-19 ANY TECHNIQUE, MULT TYPE/SUBTYPE/TARGET: ICD-10-PCS | Mod: QW,S$GLB,, | Performed by: EMERGENCY MEDICINE

## 2020-11-18 NOTE — PROGRESS NOTES
Subjective:       Patient ID: Jalyn Dickson is a 50 y.o. female.    Chief Complaint: No chief complaint on file.    Patient presents today for COVID testing. She tested positive on 11/2/2020 and was hospitalized on 11/6/2020 for COVID.  She is requesting a test to return to work. She reports an occasional cough and some fatigue with exertion. Denies f/c/n/v/d.     Review of Systems   Constitutional: Negative for chills, fatigue and fever.   HENT: Negative for congestion.    Respiratory: Negative for cough and shortness of breath.    Cardiovascular: Negative for chest pain.   Gastrointestinal: Negative for abdominal pain, diarrhea, nausea and vomiting.   Genitourinary: Negative for dysuria.   Musculoskeletal: Negative for myalgias and neck stiffness.   Neurological: Negative for dizziness, light-headedness and headaches.       Objective:      Vitals:    11/18/20 1323   BP: 112/76   Pulse: 93   Resp: 16   Temp: 97.6 °F (36.4 °C)     Physical Exam  Constitutional:       General: She is not in acute distress.     Appearance: Normal appearance. She is normal weight. She is not ill-appearing.   HENT:      Head: Normocephalic and atraumatic.      Right Ear: External ear normal.      Left Ear: External ear normal.   Eyes:      General: No scleral icterus.  Cardiovascular:      Rate and Rhythm: Normal rate and regular rhythm.      Heart sounds: Normal heart sounds.   Pulmonary:      Effort: Pulmonary effort is normal.      Breath sounds: Normal breath sounds.   Skin:     Capillary Refill: Capillary refill takes less than 2 seconds.   Neurological:      Mental Status: She is alert and oriented to person, place, and time.   Psychiatric:         Mood and Affect: Mood normal.         Behavior: Behavior normal.         Thought Content: Thought content normal.         Judgment: Judgment normal.         Assessment:       1. Exposure to COVID-19 virus        Plan:       Exposure to COVID-19 virus  -     POCT COVID-19 Rapid  Screening    Rapid COVID test is positive. Patient notified in clinic. Discussed with patient that it is possible to test positive up to 3-4 months after a initial positive test. She will continue to quarantine and will discuss with her employer.   No follow-ups on file.

## 2020-11-19 ENCOUNTER — OFFICE VISIT (OUTPATIENT)
Dept: FAMILY MEDICINE | Facility: CLINIC | Age: 50
End: 2020-11-19
Payer: COMMERCIAL

## 2020-11-19 DIAGNOSIS — I10 POORLY-CONTROLLED HYPERTENSION: Primary | ICD-10-CM

## 2020-11-19 DIAGNOSIS — U07.1 COVID-19: ICD-10-CM

## 2020-11-19 LAB
ALBUMIN SERPL-MCNC: 4.1 G/DL (ref 3.8–4.8)
ALBUMIN/GLOB SERPL: 1.5 {RATIO} (ref 1.2–2.2)
ALP SERPL-CCNC: 66 IU/L (ref 39–117)
ALT SERPL-CCNC: 26 IU/L (ref 0–32)
AST SERPL-CCNC: 15 IU/L (ref 0–40)
BASOPHILS # BLD AUTO: 0 X10E3/UL (ref 0–0.2)
BASOPHILS NFR BLD AUTO: 0 %
BILIRUB SERPL-MCNC: 0.5 MG/DL (ref 0–1.2)
BUN SERPL-MCNC: 13 MG/DL (ref 6–24)
BUN/CREAT SERPL: 18 (ref 9–23)
CALCIUM SERPL-MCNC: 9.2 MG/DL (ref 8.7–10.2)
CHLORIDE SERPL-SCNC: 100 MMOL/L (ref 96–106)
CO2 SERPL-SCNC: 24 MMOL/L (ref 20–29)
CREAT SERPL-MCNC: 0.71 MG/DL (ref 0.57–1)
EOSINOPHIL # BLD AUTO: 0.1 X10E3/UL (ref 0–0.4)
EOSINOPHIL NFR BLD AUTO: 1 %
ERYTHROCYTE [DISTWIDTH] IN BLOOD BY AUTOMATED COUNT: 14.7 % (ref 11.7–15.4)
GLOBULIN SER CALC-MCNC: 2.7 G/DL (ref 1.5–4.5)
GLUCOSE SERPL-MCNC: 82 MG/DL (ref 65–99)
HCT VFR BLD AUTO: 41.9 % (ref 34–46.6)
HGB BLD-MCNC: 12.7 G/DL (ref 11.1–15.9)
IMM GRANULOCYTES # BLD AUTO: 0.1 X10E3/UL (ref 0–0.1)
IMM GRANULOCYTES NFR BLD AUTO: 1 %
LYMPHOCYTES # BLD AUTO: 5.1 X10E3/UL (ref 0.7–3.1)
LYMPHOCYTES NFR BLD AUTO: 42 %
MCH RBC QN AUTO: 26.3 PG (ref 26.6–33)
MCHC RBC AUTO-ENTMCNC: 30.3 G/DL (ref 31.5–35.7)
MCV RBC AUTO: 87 FL (ref 79–97)
MONOCYTES # BLD AUTO: 1 X10E3/UL (ref 0.1–0.9)
MONOCYTES NFR BLD AUTO: 8 %
NEUTROPHILS # BLD AUTO: 5.7 X10E3/UL (ref 1.4–7)
NEUTROPHILS NFR BLD AUTO: 48 %
PLATELET # BLD AUTO: 532 X10E3/UL (ref 150–450)
POTASSIUM SERPL-SCNC: 4.5 MMOL/L (ref 3.5–5.2)
PROT SERPL-MCNC: 6.8 G/DL (ref 6–8.5)
RBC # BLD AUTO: 4.83 X10E6/UL (ref 3.77–5.28)
SODIUM SERPL-SCNC: 137 MMOL/L (ref 134–144)
WBC # BLD AUTO: 12.1 X10E3/UL (ref 3.4–10.8)

## 2020-11-19 PROCEDURE — 1111F PR DISCHARGE MEDS RECONCILED W/ CURRENT OUTPATIENT MED LIST: ICD-10-PCS | Mod: ,,, | Performed by: INTERNAL MEDICINE

## 2020-11-19 PROCEDURE — 1111F DSCHRG MED/CURRENT MED MERGE: CPT | Mod: ,,, | Performed by: INTERNAL MEDICINE

## 2020-11-19 PROCEDURE — 99214 OFFICE O/P EST MOD 30 MIN: CPT | Mod: 95,,, | Performed by: INTERNAL MEDICINE

## 2020-11-19 PROCEDURE — 99214 PR OFFICE/OUTPT VISIT, EST, LEVL IV, 30-39 MIN: ICD-10-PCS | Mod: 95,,, | Performed by: INTERNAL MEDICINE

## 2020-11-19 RX ORDER — DILTIAZEM HYDROCHLORIDE 60 MG/1
TABLET, FILM COATED ORAL
Qty: 90 TABLET | Refills: 2 | Status: SHIPPED | OUTPATIENT
Start: 2020-11-19 | End: 2021-01-11 | Stop reason: SDUPTHER

## 2020-11-19 RX ORDER — HYDROCHLOROTHIAZIDE 12.5 MG/1
12.5 TABLET ORAL DAILY
Qty: 30 TABLET | Refills: 2 | Status: SHIPPED | OUTPATIENT
Start: 2020-11-19 | End: 2021-01-05

## 2020-11-19 RX ORDER — VALSARTAN 160 MG/1
160 TABLET ORAL DAILY
Qty: 30 TABLET | Refills: 5 | Status: SHIPPED | OUTPATIENT
Start: 2020-11-19 | End: 2021-01-11 | Stop reason: SDUPTHER

## 2020-11-19 NOTE — PROGRESS NOTES
Subjective:        The chief complaint leading to consultation is: BP an dpost COVID  The patient location is:  Home  Visit type: Virtual visit with synchronous audio/video or audio only  This was a video visit in lieu of in-person visit due to the coronavirus emergency. Patient acknowledged and consented to the video visit encounter.     HPI     Patient post COVID discharged form hospital about ten days ago and went for work clearance yesterday and she is still COVID positive    Patient states she is upset because she is still positive-but she works at home-her  is still in ICU and is hard to come off the BIPAP-finally at 50% Oxygen-pt feels she is still somewhat fatigued-realizes her limits-cough continues minimally and SOB with moderate activity-no fever    -150/100she has been given cardizem bid(30)- we will increase to 60 tid        Office Visit on 11/18/2020   Component Date Value Ref Range Status    POC Rapid COVID 11/18/2020 Positive* Negative Final     Acceptable 11/18/2020 Yes   Final   No results displayed because visit has over 200 results.      Office Visit on 11/02/2020   Component Date Value Ref Range Status    POC Rapid COVID 11/02/2020 Positive* Negative Final     Acceptable 11/02/2020 Yes   Final       Past Surgical History:   Procedure Laterality Date    NECK LESION BIOPSY      neck mass removal      TUBAL LIGATION  1999     Past Medical History:   Diagnosis Date    Acid reflux     Allergy     Anxiety     Hypertension     Neck mass     Pulmonary embolism     Sinus drainage      Family History   Problem Relation Age of Onset    Heart disease Mother     Coronary artery disease Father     Peripheral vascular disease Father     Diabetes Father     Hypertension Father     Hypertension Maternal Grandmother     No Known Problems Maternal Grandfather     PDD Paternal Grandfather     Hypertension Paternal Grandfather     No Known  Problems Son         Social History:   Marital Status:   Alcohol History:  reports current alcohol use of about 2.0 standard drinks of alcohol per week.  Tobacco History:  reports that she has never smoked. She has never used smokeless tobacco.  Drug History:  reports current drug use.    Review of patient's allergies indicates:  No Known Allergies    Current Outpatient Medications   Medication Sig Dispense Refill    albuterol (PROAIR HFA) 90 mcg/actuation inhaler Inhale 1 puff into the lungs every 4 (four) hours as needed.       amLODIPine-valsartan-hcthiazid -12.5 mg Tab Take 1 tablet by mouth once daily. 90 tablet 3    ascorbic acid, vitamin C, (VITAMIN C) 1000 MG tablet Take 1,000 mg by mouth once daily.      aspirin 81 MG Chew Take 81 mg by mouth once daily.      b complex vitamins tablet Take 1 tablet by mouth once daily.      cyanocobalamin (VITAMIN B-12) 1000 MCG tablet Take 100 mcg by mouth once daily.      dexAMETHasone (DECADRON) 6 MG tablet Take 1 tablet (6 mg total) by mouth once daily. for 10 days 7 tablet 0    diltiaZEM (CARDIZEM) 30 MG tablet Take one twice daily if needed for BP greater than 150/90 40 tablet 1    diltiaZEM (CARDIZEM) 60 MG tablet Take one three times a day 90 tablet 2    GENERLAC 10 gram/15 mL solution Take 10 g by mouth 2 (two) times daily.       hydroCHLOROthiazide (HYDRODIURIL) 12.5 MG Tab Take 1 tablet (12.5 mg total) by mouth once daily. 30 tablet 2    levocetirizine (XYZAL) 5 MG tablet Take 5 mg by mouth every evening.      LORazepam (ATIVAN) 0.5 MG tablet Take 1 tablet (0.5 mg total) by mouth daily as needed for Anxiety. 90 tablet 0    mometasone (NASONEX) 50 mcg/actuation nasal spray Nasonex 50 mcg/actuation Spray      omeprazole (PRILOSEC) 20 MG capsule Take 20 mg by mouth once daily.      valsartan (DIOVAN) 160 MG tablet Take 1 tablet (160 mg total) by mouth once daily. 30 tablet 5    vitamin E 400 UNIT capsule Take 400 Units by mouth once  "daily.      zinc sulfate (ZINC-220) 220 (50) mg capsule Take 1 capsule by mouth once daily.       No current facility-administered medications for this visit.        Review of Systems   Constitutional: Positive for appetite change (no taste return yet-smell "hit or miss"). Negative for chills, diaphoresis, fatigue, fever and unexpected weight change.   HENT: Negative for congestion, ear pain, hearing loss, nosebleeds, postnasal drip, sinus pressure, sinus pain, sneezing, sore throat, tinnitus, trouble swallowing and voice change.    Eyes: Negative for photophobia, pain, itching and visual disturbance.   Respiratory: Negative for apnea, cough (dry), chest tightness, shortness of breath (HPI), wheezing and stridor.    Cardiovascular: Negative for chest pain, palpitations and leg swelling.   Gastrointestinal: Negative for abdominal distention, abdominal pain, blood in stool, constipation, diarrhea, nausea and vomiting.   Endocrine: Negative for cold intolerance, heat intolerance, polydipsia and polyuria.   Genitourinary: Negative for difficulty urinating, dyspareunia, dysuria, flank pain, frequency, hematuria, menstrual problem, pelvic pain, urgency, vaginal discharge and vaginal pain.   Musculoskeletal: Negative for arthralgias, back pain, joint swelling, myalgias, neck pain and neck stiffness.   Skin: Negative for pallor.   Allergic/Immunologic: Negative for environmental allergies and food allergies.   Neurological: Negative for dizziness, tremors, speech difficulty, weakness, light-headedness, numbness and headaches.   Hematological: Does not bruise/bleed easily.   Psychiatric/Behavioral: Negative for agitation, confusion, decreased concentration, sleep disturbance and suicidal ideas. The patient is not nervous/anxious.          Objective:          Physical Exam         Assessment:       1. Poorly-controlled hypertension    2. COVID-19      Plan:   Poorly-controlled hypertension  -     diltiaZEM (CARDIZEM) 60 MG " tablet; Take one three times a day  Dispense: 90 tablet; Refill: 2  -     valsartan (DIOVAN) 160 MG tablet; Take 1 tablet (160 mg total) by mouth once daily.  Dispense: 30 tablet; Refill: 5  -     hydroCHLOROthiazide (HYDRODIURIL) 12.5 MG Tab; Take 1 tablet (12.5 mg total) by mouth once daily.  Dispense: 30 tablet; Refill: 2  -     Stop exforge and amlodipine    COVID-19      Follow up in about 4 weeks (around 12/17/2020) for BP.    Total time spent with patient: 19 minutes    Each patient to whom he or she provides medical services by telemedicine is:  (1) informed of the relationship between the physician and patient and the respective role of any other health care provider with respect to management of the patient; and (2) notified that he or she may decline to receive medical services by telemedicine and may withdraw from such care at any time.    This note was created using [employer] voice recognition software that occasionally misinterprets phrases or words.

## 2020-12-02 ENCOUNTER — PATIENT MESSAGE (OUTPATIENT)
Dept: FAMILY MEDICINE | Facility: CLINIC | Age: 50
End: 2020-12-02

## 2020-12-03 ENCOUNTER — OFFICE VISIT (OUTPATIENT)
Dept: FAMILY MEDICINE | Facility: CLINIC | Age: 50
End: 2020-12-03
Payer: COMMERCIAL

## 2020-12-03 DIAGNOSIS — I10 POORLY-CONTROLLED HYPERTENSION: Primary | ICD-10-CM

## 2020-12-03 PROCEDURE — 1111F PR DISCHARGE MEDS RECONCILED W/ CURRENT OUTPATIENT MED LIST: ICD-10-PCS | Mod: ,,, | Performed by: INTERNAL MEDICINE

## 2020-12-03 PROCEDURE — 1111F DSCHRG MED/CURRENT MED MERGE: CPT | Mod: ,,, | Performed by: INTERNAL MEDICINE

## 2020-12-03 PROCEDURE — 99214 PR OFFICE/OUTPT VISIT, EST, LEVL IV, 30-39 MIN: ICD-10-PCS | Mod: 95,,, | Performed by: INTERNAL MEDICINE

## 2020-12-03 PROCEDURE — 99214 OFFICE O/P EST MOD 30 MIN: CPT | Mod: 95,,, | Performed by: INTERNAL MEDICINE

## 2020-12-03 NOTE — PROGRESS NOTES
Subjective:        The chief complaint leading to consultation is: hypertension  The patient location is:  Home  Visit type: Virtual visit with synchronous audio/video or audio only  This was a video visit in lieu of in-person visit due to the coronavirus emergency. Patient acknowledged and consented to the video visit encounter.     HPI     Home blood pressure readings: high 174/111 after medicine. Salt intake and diet: salt not added to cooking. Usual weight: 229. Associated signs and symptoms: headache. Patient denies: blurred vision, dyspnea, neck aches, orthopnea, palpitations, paroxysmal nocturnal dyspnea, peripheral edema, pulsating in the ears and tiredness/fatigue. Use of agents associated with hypertension: none. Medication compliance: taking as prescribed.CARDIZEM 90 VALSARTAN 160 HCTZ 12.5  AMLODIPINE 10-VALSARTAN 160 HCTZ 12.5        Office Visit on 11/18/2020   Component Date Value Ref Range Status    POC Rapid COVID 11/18/2020 Positive* Negative Final     Acceptable 11/18/2020 Yes   Final   No results displayed because visit has over 200 results.      Office Visit on 11/02/2020   Component Date Value Ref Range Status    POC Rapid COVID 11/02/2020 Positive* Negative Final     Acceptable 11/02/2020 Yes   Final       Past Surgical History:   Procedure Laterality Date    NECK LESION BIOPSY      neck mass removal      TUBAL LIGATION  1999     Past Medical History:   Diagnosis Date    Acid reflux     Allergy     Anxiety     Hypertension     Neck mass     Pulmonary embolism     Sinus drainage      Family History   Problem Relation Age of Onset    Heart disease Mother     Coronary artery disease Father     Peripheral vascular disease Father     Diabetes Father     Hypertension Father     Hypertension Maternal Grandmother     No Known Problems Maternal Grandfather     PDD Paternal Grandfather     Hypertension Paternal Grandfather     No Known Problems  Son         Social History:   Marital Status:   Alcohol History:  reports current alcohol use of about 2.0 standard drinks of alcohol per week.  Tobacco History:  reports that she has never smoked. She has never used smokeless tobacco.  Drug History:  reports current drug use.    Review of patient's allergies indicates:  No Known Allergies    Current Outpatient Medications   Medication Sig Dispense Refill    albuterol (PROAIR HFA) 90 mcg/actuation inhaler Inhale 1 puff into the lungs every 4 (four) hours as needed.       diltiaZEM (CARDIZEM) 60 MG tablet Take one three times a day 90 tablet 2    hydroCHLOROthiazide (HYDRODIURIL) 12.5 MG Tab Take 1 tablet (12.5 mg total) by mouth once daily. 30 tablet 2    valsartan (DIOVAN) 160 MG tablet Take 1 tablet (160 mg total) by mouth once daily. 30 tablet 5    ascorbic acid, vitamin C, (VITAMIN C) 1000 MG tablet Take 1,000 mg by mouth once daily.      aspirin 81 MG Chew Take 81 mg by mouth once daily.      b complex vitamins tablet Take 1 tablet by mouth once daily.      cyanocobalamin (VITAMIN B-12) 1000 MCG tablet Take 100 mcg by mouth once daily.      GENERLAC 10 gram/15 mL solution Take 10 g by mouth 2 (two) times daily.       levocetirizine (XYZAL) 5 MG tablet Take 5 mg by mouth every evening.      LORazepam (ATIVAN) 0.5 MG tablet Take 1 tablet (0.5 mg total) by mouth daily as needed for Anxiety. 90 tablet 0    mometasone (NASONEX) 50 mcg/actuation nasal spray Nasonex 50 mcg/actuation Spray      omeprazole (PRILOSEC) 20 MG capsule Take 20 mg by mouth once daily.      vitamin E 400 UNIT capsule Take 400 Units by mouth once daily.      zinc sulfate (ZINC-220) 220 (50) mg capsule Take 1 capsule by mouth once daily.       No current facility-administered medications for this visit.        Review of Systems   Constitutional: Positive for appetite change (dysgeusia post COVID). Negative for chills, diaphoresis, fatigue, fever and unexpected weight  change.   HENT: Negative for congestion, ear pain, hearing loss, nosebleeds, postnasal drip, sinus pressure, sinus pain, sneezing, sore throat, tinnitus, trouble swallowing and voice change.         Sense of smell reduced   Eyes: Negative for photophobia, pain, itching and visual disturbance.   Respiratory: Positive for shortness of breath (moderate activity). Negative for apnea, cough, chest tightness, wheezing and stridor.    Cardiovascular: Negative for chest pain, palpitations and leg swelling.   Gastrointestinal: Negative for abdominal distention, abdominal pain, blood in stool, constipation, diarrhea, nausea and vomiting.        GERD responds well to prilosec   Endocrine: Negative for cold intolerance, heat intolerance, polydipsia and polyuria.   Genitourinary: Positive for frequency. Negative for difficulty urinating, dyspareunia, dysuria, flank pain, hematuria, menstrual problem, pelvic pain, urgency, vaginal discharge and vaginal pain.   Musculoskeletal: Negative for arthralgias, back pain, joint swelling, myalgias, neck pain and neck stiffness.   Skin: Negative for pallor.   Allergic/Immunologic: Negative for environmental allergies and food allergies.   Neurological: Positive for headaches (frontal). Negative for dizziness, tremors, speech difficulty, weakness, light-headedness and numbness.   Hematological: Does not bruise/bleed easily.   Psychiatric/Behavioral: Negative for agitation, confusion, decreased concentration, sleep disturbance and suicidal ideas. The patient is not nervous/anxious.          Objective:        Physical Exam         Assessment:       1. Poorly-controlled hypertension      Plan:   Poorly-controlled hypertension      Follow up in about 1 week (around 12/10/2020) for BP.    Total time spent with patient: 18 minutes    Each patient to whom he or she provides medical services by telemedicine is:  (1) informed of the relationship between the physician and patient and the respective  role of any other health care provider with respect to management of the patient; and (2) notified that he or she may decline to receive medical services by telemedicine and may withdraw from such care at any time.    This note was created using GBS voice recognition software that occasionally misinterprets phrases or words.

## 2020-12-10 ENCOUNTER — TELEPHONE (OUTPATIENT)
Dept: FAMILY MEDICINE | Facility: CLINIC | Age: 50
End: 2020-12-10

## 2020-12-10 NOTE — TELEPHONE ENCOUNTER
SPOKE WITH PATIENT TO R/S HER APPT AND CHECK ON HER BP. SHE STATES SHE IS DOING MUCH BETTER AND HER SYSTOLIC HAS BEEN IN 'S AND DIASTOLIC IN THE 70'S TO HIGH 80'S.

## 2021-01-14 DIAGNOSIS — I10 ESSENTIAL HYPERTENSION: Primary | ICD-10-CM

## 2021-01-14 RX ORDER — DILTIAZEM HYDROCHLORIDE 60 MG/1
60 TABLET, FILM COATED ORAL EVERY 8 HOURS
Qty: 270 TABLET | Refills: 3 | Status: SHIPPED | OUTPATIENT
Start: 2021-01-14 | End: 2021-01-25

## 2021-01-14 RX ORDER — HYDROCHLOROTHIAZIDE 12.5 MG/1
12.5 TABLET ORAL 2 TIMES DAILY
Qty: 180 TABLET | Refills: 3 | Status: SHIPPED | OUTPATIENT
Start: 2021-01-14 | End: 2022-04-04 | Stop reason: SDUPTHER

## 2021-01-14 RX ORDER — VALSARTAN 160 MG/1
160 TABLET ORAL 2 TIMES DAILY
Qty: 180 TABLET | Refills: 0 | Status: SHIPPED | OUTPATIENT
Start: 2021-01-14 | End: 2021-01-25

## 2021-01-25 ENCOUNTER — OFFICE VISIT (OUTPATIENT)
Dept: FAMILY MEDICINE | Facility: CLINIC | Age: 51
End: 2021-01-25
Payer: COMMERCIAL

## 2021-01-25 VITALS
OXYGEN SATURATION: 98 % | HEART RATE: 75 BPM | HEIGHT: 64 IN | WEIGHT: 240.38 LBS | TEMPERATURE: 98 F | SYSTOLIC BLOOD PRESSURE: 134 MMHG | DIASTOLIC BLOOD PRESSURE: 80 MMHG | BODY MASS INDEX: 41.04 KG/M2

## 2021-01-25 DIAGNOSIS — I10 UNCONTROLLED HYPERTENSION: Primary | ICD-10-CM

## 2021-01-25 PROCEDURE — 3079F PR MOST RECENT DIASTOLIC BLOOD PRESSURE 80-89 MM HG: ICD-10-PCS | Mod: S$GLB,,, | Performed by: NURSE PRACTITIONER

## 2021-01-25 PROCEDURE — 3008F PR BODY MASS INDEX (BMI) DOCUMENTED: ICD-10-PCS | Mod: S$GLB,,, | Performed by: NURSE PRACTITIONER

## 2021-01-25 PROCEDURE — 3075F PR MOST RECENT SYSTOLIC BLOOD PRESS GE 130-139MM HG: ICD-10-PCS | Mod: S$GLB,,, | Performed by: NURSE PRACTITIONER

## 2021-01-25 PROCEDURE — 1126F AMNT PAIN NOTED NONE PRSNT: CPT | Mod: S$GLB,,, | Performed by: NURSE PRACTITIONER

## 2021-01-25 PROCEDURE — 3008F BODY MASS INDEX DOCD: CPT | Mod: S$GLB,,, | Performed by: NURSE PRACTITIONER

## 2021-01-25 PROCEDURE — 1126F PR PAIN SEVERITY QUANTIFIED, NO PAIN PRESENT: ICD-10-PCS | Mod: S$GLB,,, | Performed by: NURSE PRACTITIONER

## 2021-01-25 PROCEDURE — 3075F SYST BP GE 130 - 139MM HG: CPT | Mod: S$GLB,,, | Performed by: NURSE PRACTITIONER

## 2021-01-25 PROCEDURE — 3079F DIAST BP 80-89 MM HG: CPT | Mod: S$GLB,,, | Performed by: NURSE PRACTITIONER

## 2021-01-25 PROCEDURE — 99213 PR OFFICE/OUTPT VISIT, EST, LEVL III, 20-29 MIN: ICD-10-PCS | Mod: S$GLB,,, | Performed by: NURSE PRACTITIONER

## 2021-01-25 PROCEDURE — 99213 OFFICE O/P EST LOW 20 MIN: CPT | Mod: S$GLB,,, | Performed by: NURSE PRACTITIONER

## 2021-01-25 RX ORDER — INFLUENZA A VIRUS A/VICTORIA/2454/2019 IVR-207 (H1N1) ANTIGEN (PROPIOLACTONE INACTIVATED), INFLUENZA A VIRUS A/HONG KONG/2671/2019 IVR-208 (H3N2) ANTIGEN (PROPIOLACTONE INACTIVATED), INFLUENZA B VIRUS B/VICTORIA/705/2018 BVR-11 ANTIGEN (PROPIOLACTONE INACTIVATED), INFLUENZA B VIRUS B/PHUKET/3073/2013 BVR-1B ANTIGEN (PROPIOLACTONE INACTIVATED) 15; 15; 15; 15 UG/.5ML; UG/.5ML; UG/.5ML; UG/.5ML
INJECTION, SUSPENSION INTRAMUSCULAR
COMMUNITY
End: 2021-02-25 | Stop reason: ALTCHOICE

## 2021-01-25 RX ORDER — DILTIAZEM HYDROCHLORIDE 60 MG/1
120 TABLET, FILM COATED ORAL EVERY 8 HOURS
Qty: 180 TABLET | Refills: 0
Start: 2021-01-25 | End: 2021-02-25

## 2021-01-25 RX ORDER — VALSARTAN 160 MG/1
160 TABLET ORAL 2 TIMES DAILY
Qty: 180 TABLET | Refills: 0 | Status: SHIPPED | OUTPATIENT
Start: 2021-01-25 | End: 2021-04-12 | Stop reason: SDUPTHER

## 2021-02-04 ENCOUNTER — HOSPITAL ENCOUNTER (OUTPATIENT)
Dept: RADIOLOGY | Facility: HOSPITAL | Age: 51
Discharge: HOME | End: 2021-02-04
Attending: NURSE PRACTITIONER
Payer: COMMERCIAL

## 2021-02-04 DIAGNOSIS — I10 UNCONTROLLED HYPERTENSION: ICD-10-CM

## 2021-02-04 PROCEDURE — 93975 VASCULAR STUDY: CPT | Mod: TC,PO

## 2021-02-05 LAB
ALBUMIN SERPL-MCNC: 4.1 G/DL (ref 3.8–4.9)
ALBUMIN/GLOB SERPL: 1.3 {RATIO} (ref 1.2–2.2)
ALP SERPL-CCNC: 94 IU/L (ref 39–117)
ALT SERPL-CCNC: 19 IU/L (ref 0–32)
AST SERPL-CCNC: 20 IU/L (ref 0–40)
BASOPHILS # BLD AUTO: 0.1 X10E3/UL (ref 0–0.2)
BASOPHILS NFR BLD AUTO: 1 %
BILIRUB SERPL-MCNC: <0.2 MG/DL (ref 0–1.2)
BUN SERPL-MCNC: 16 MG/DL (ref 6–24)
BUN/CREAT SERPL: 21 (ref 9–23)
CALCIUM SERPL-MCNC: 10.1 MG/DL (ref 8.7–10.2)
CHLORIDE SERPL-SCNC: 100 MMOL/L (ref 96–106)
CHOLEST SERPL-MCNC: 245 MG/DL (ref 100–199)
CO2 SERPL-SCNC: 24 MMOL/L (ref 20–29)
CREAT SERPL-MCNC: 0.77 MG/DL (ref 0.57–1)
EOSINOPHIL # BLD AUTO: 1 X10E3/UL (ref 0–0.4)
EOSINOPHIL NFR BLD AUTO: 11 %
ERYTHROCYTE [DISTWIDTH] IN BLOOD BY AUTOMATED COUNT: 15.3 % (ref 11.7–15.4)
GLOBULIN SER CALC-MCNC: 3.2 G/DL (ref 1.5–4.5)
GLUCOSE SERPL-MCNC: 89 MG/DL (ref 65–99)
HCT VFR BLD AUTO: 39 % (ref 34–46.6)
HDLC SERPL-MCNC: 87 MG/DL
HGB BLD-MCNC: 12 G/DL (ref 11.1–15.9)
IMM GRANULOCYTES # BLD AUTO: 0 X10E3/UL (ref 0–0.1)
IMM GRANULOCYTES NFR BLD AUTO: 0 %
LDLC SERPL CALC-MCNC: 142 MG/DL (ref 0–99)
LYMPHOCYTES # BLD AUTO: 1.8 X10E3/UL (ref 0.7–3.1)
LYMPHOCYTES NFR BLD AUTO: 22 %
MAGNESIUM SERPL-MCNC: 2 MG/DL (ref 1.6–2.3)
MCH RBC QN AUTO: 25.6 PG (ref 26.6–33)
MCHC RBC AUTO-ENTMCNC: 30.8 G/DL (ref 31.5–35.7)
MCV RBC AUTO: 83 FL (ref 79–97)
MONOCYTES # BLD AUTO: 0.9 X10E3/UL (ref 0.1–0.9)
MONOCYTES NFR BLD AUTO: 10 %
NEUTROPHILS # BLD AUTO: 4.7 X10E3/UL (ref 1.4–7)
NEUTROPHILS NFR BLD AUTO: 56 %
PLATELET # BLD AUTO: 483 X10E3/UL (ref 150–450)
POTASSIUM SERPL-SCNC: 4.4 MMOL/L (ref 3.5–5.2)
PROT SERPL-MCNC: 7.3 G/DL (ref 6–8.5)
RBC # BLD AUTO: 4.68 X10E6/UL (ref 3.77–5.28)
SODIUM SERPL-SCNC: 138 MMOL/L (ref 134–144)
T4 FREE SERPL-MCNC: 0.9 NG/DL (ref 0.82–1.77)
TRIGL SERPL-MCNC: 94 MG/DL (ref 0–149)
TSH SERPL DL<=0.005 MIU/L-ACNC: 2.05 UIU/ML (ref 0.45–4.5)
VLDLC SERPL CALC-MCNC: 16 MG/DL (ref 5–40)
WBC # BLD AUTO: 8.4 X10E3/UL (ref 3.4–10.8)

## 2021-02-25 ENCOUNTER — OFFICE VISIT (OUTPATIENT)
Dept: FAMILY MEDICINE | Facility: CLINIC | Age: 51
End: 2021-02-25
Payer: COMMERCIAL

## 2021-02-25 VITALS
HEART RATE: 68 BPM | HEIGHT: 64 IN | TEMPERATURE: 98 F | SYSTOLIC BLOOD PRESSURE: 120 MMHG | RESPIRATION RATE: 18 BRPM | WEIGHT: 237 LBS | BODY MASS INDEX: 40.46 KG/M2 | DIASTOLIC BLOOD PRESSURE: 82 MMHG | OXYGEN SATURATION: 97 %

## 2021-02-25 DIAGNOSIS — Z11.4 ENCOUNTER FOR SCREENING FOR HIV: ICD-10-CM

## 2021-02-25 DIAGNOSIS — E78.00 PURE HYPERCHOLESTEROLEMIA: ICD-10-CM

## 2021-02-25 DIAGNOSIS — Z11.59 NEED FOR HEPATITIS C SCREENING TEST: ICD-10-CM

## 2021-02-25 DIAGNOSIS — F41.9 ANXIETY: ICD-10-CM

## 2021-02-25 DIAGNOSIS — I10 UNCONTROLLED HYPERTENSION: Primary | ICD-10-CM

## 2021-02-25 DIAGNOSIS — Z12.11 SPECIAL SCREENING FOR MALIGNANT NEOPLASM OF COLON: ICD-10-CM

## 2021-02-25 DIAGNOSIS — D50.0 IRON DEFICIENCY ANEMIA DUE TO CHRONIC BLOOD LOSS: ICD-10-CM

## 2021-02-25 PROCEDURE — 99214 PR OFFICE/OUTPT VISIT, EST, LEVL IV, 30-39 MIN: ICD-10-PCS | Mod: S$GLB,,, | Performed by: INTERNAL MEDICINE

## 2021-02-25 PROCEDURE — 1126F AMNT PAIN NOTED NONE PRSNT: CPT | Mod: S$GLB,,, | Performed by: INTERNAL MEDICINE

## 2021-02-25 PROCEDURE — 99214 OFFICE O/P EST MOD 30 MIN: CPT | Mod: S$GLB,,, | Performed by: INTERNAL MEDICINE

## 2021-02-25 PROCEDURE — 3074F PR MOST RECENT SYSTOLIC BLOOD PRESSURE < 130 MM HG: ICD-10-PCS | Mod: S$GLB,,, | Performed by: INTERNAL MEDICINE

## 2021-02-25 PROCEDURE — 3079F PR MOST RECENT DIASTOLIC BLOOD PRESSURE 80-89 MM HG: ICD-10-PCS | Mod: S$GLB,,, | Performed by: INTERNAL MEDICINE

## 2021-02-25 PROCEDURE — 3008F PR BODY MASS INDEX (BMI) DOCUMENTED: ICD-10-PCS | Mod: S$GLB,,, | Performed by: INTERNAL MEDICINE

## 2021-02-25 PROCEDURE — 3079F DIAST BP 80-89 MM HG: CPT | Mod: S$GLB,,, | Performed by: INTERNAL MEDICINE

## 2021-02-25 PROCEDURE — 3008F BODY MASS INDEX DOCD: CPT | Mod: S$GLB,,, | Performed by: INTERNAL MEDICINE

## 2021-02-25 PROCEDURE — 3074F SYST BP LT 130 MM HG: CPT | Mod: S$GLB,,, | Performed by: INTERNAL MEDICINE

## 2021-02-25 PROCEDURE — 1126F PR PAIN SEVERITY QUANTIFIED, NO PAIN PRESENT: ICD-10-PCS | Mod: S$GLB,,, | Performed by: INTERNAL MEDICINE

## 2021-02-25 RX ORDER — DILTIAZEM HYDROCHLORIDE 60 MG/1
120 TABLET, FILM COATED ORAL EVERY 8 HOURS
Qty: 180 TABLET | Refills: 0 | Status: CANCELLED | OUTPATIENT
Start: 2021-02-25

## 2021-02-25 RX ORDER — LORAZEPAM 0.5 MG/1
0.5 TABLET ORAL DAILY PRN
Qty: 90 TABLET | Refills: 0 | Status: SHIPPED | OUTPATIENT
Start: 2021-02-25 | End: 2022-04-04 | Stop reason: SDUPTHER

## 2021-02-25 RX ORDER — ATORVASTATIN CALCIUM 40 MG/1
40 TABLET, FILM COATED ORAL DAILY
Qty: 90 TABLET | Refills: 1 | Status: SHIPPED | OUTPATIENT
Start: 2021-02-25 | End: 2021-05-17 | Stop reason: SDUPTHER

## 2021-02-25 RX ORDER — DILTIAZEM HYDROCHLORIDE 240 MG/1
240 CAPSULE, COATED, EXTENDED RELEASE ORAL DAILY
Qty: 30 CAPSULE | Refills: 2 | Status: SHIPPED | OUTPATIENT
Start: 2021-02-25 | End: 2021-05-17 | Stop reason: SDUPTHER

## 2021-02-26 ENCOUNTER — HOSPITAL ENCOUNTER (OUTPATIENT)
Facility: HOSPITAL | Age: 51
Discharge: HOME | End: 2021-02-26
Attending: EMERGENCY MEDICINE | Admitting: FAMILY MEDICINE
Payer: COMMERCIAL

## 2021-02-26 ENCOUNTER — CLINICAL SUPPORT (OUTPATIENT)
Dept: CARDIOLOGY | Facility: HOSPITAL | Age: 51
End: 2021-02-26
Attending: FAMILY MEDICINE
Payer: COMMERCIAL

## 2021-02-26 VITALS
TEMPERATURE: 98 F | HEART RATE: 67 BPM | WEIGHT: 233 LBS | RESPIRATION RATE: 16 BRPM | SYSTOLIC BLOOD PRESSURE: 138 MMHG | BODY MASS INDEX: 39.78 KG/M2 | HEIGHT: 64 IN | DIASTOLIC BLOOD PRESSURE: 88 MMHG | OXYGEN SATURATION: 100 %

## 2021-02-26 VITALS — HEIGHT: 64 IN | WEIGHT: 237 LBS | BODY MASS INDEX: 40.46 KG/M2

## 2021-02-26 DIAGNOSIS — M77.8 TENDINITIS OF LEFT SHOULDER: Primary | ICD-10-CM

## 2021-02-26 DIAGNOSIS — R07.9 CHEST PAIN: ICD-10-CM

## 2021-02-26 PROBLEM — Z86.16 HISTORY OF 2019 NOVEL CORONAVIRUS DISEASE (COVID-19): Chronic | Status: ACTIVE | Noted: 2021-02-26

## 2021-02-26 PROBLEM — U07.1 PNEUMONIA DUE TO COVID-19 VIRUS: Status: RESOLVED | Noted: 2020-11-06 | Resolved: 2021-02-26

## 2021-02-26 PROBLEM — I26.99 PULMONARY EMBOLISM, BILATERAL: Chronic | Status: ACTIVE | Noted: 2017-09-08

## 2021-02-26 PROBLEM — F41.9 ANXIETY: Chronic | Status: ACTIVE | Noted: 2018-04-09

## 2021-02-26 PROBLEM — J96.01 ACUTE HYPOXEMIC RESPIRATORY FAILURE: Status: RESOLVED | Noted: 2020-11-06 | Resolved: 2021-02-26

## 2021-02-26 PROBLEM — E78.00 PURE HYPERCHOLESTEROLEMIA: Chronic | Status: ACTIVE | Noted: 2017-09-08

## 2021-02-26 PROBLEM — J12.82 PNEUMONIA DUE TO COVID-19 VIRUS: Status: RESOLVED | Noted: 2020-11-06 | Resolved: 2021-02-26

## 2021-02-26 PROBLEM — I10 ESSENTIAL HYPERTENSION: Chronic | Status: ACTIVE | Noted: 2017-09-08

## 2021-02-26 LAB
ALBUMIN SERPL BCP-MCNC: 3.7 G/DL (ref 3.5–5.2)
ALP SERPL-CCNC: 60 U/L (ref 55–135)
ALT SERPL W/O P-5'-P-CCNC: 14 U/L (ref 10–44)
ANION GAP SERPL CALC-SCNC: 11 MMOL/L (ref 8–16)
AST SERPL-CCNC: 16 U/L (ref 10–40)
BASOPHILS # BLD AUTO: 0.06 K/UL (ref 0–0.2)
BASOPHILS NFR BLD: 0.7 % (ref 0–1.9)
BILIRUB SERPL-MCNC: 0.7 MG/DL (ref 0.1–1)
BNP SERPL-MCNC: 24 PG/ML (ref 0–99)
BSA FOR ECHO PROCEDURE: 2.2 M2
BUN SERPL-MCNC: 12 MG/DL (ref 6–20)
CALCIUM SERPL-MCNC: 8.9 MG/DL (ref 8.7–10.5)
CHLORIDE SERPL-SCNC: 101 MMOL/L (ref 95–110)
CO2 SERPL-SCNC: 25 MMOL/L (ref 23–29)
CREAT SERPL-MCNC: 0.7 MG/DL (ref 0.5–1.4)
CV STRESS BASE HR: 80 BPM
D DIMER PPP IA.FEU-MCNC: 0.42 UG/ML FEU
DIASTOLIC BLOOD PRESSURE: 88 MMHG
DIFFERENTIAL METHOD: ABNORMAL
EOSINOPHIL # BLD AUTO: 0.4 K/UL (ref 0–0.5)
EOSINOPHIL NFR BLD: 4.1 % (ref 0–8)
ERYTHROCYTE [DISTWIDTH] IN BLOOD BY AUTOMATED COUNT: 15.1 % (ref 11.5–14.5)
EST. GFR  (AFRICAN AMERICAN): >60 ML/MIN/1.73 M^2
EST. GFR  (NON AFRICAN AMERICAN): >60 ML/MIN/1.73 M^2
GLUCOSE SERPL-MCNC: 112 MG/DL (ref 70–110)
HCT VFR BLD AUTO: 37.3 % (ref 37–48.5)
HGB BLD-MCNC: 11.5 G/DL (ref 12–16)
IMM GRANULOCYTES # BLD AUTO: 0.03 K/UL (ref 0–0.04)
IMM GRANULOCYTES NFR BLD AUTO: 0.4 % (ref 0–0.5)
LYMPHOCYTES # BLD AUTO: 1.5 K/UL (ref 1–4.8)
LYMPHOCYTES NFR BLD: 17.1 % (ref 18–48)
MCH RBC QN AUTO: 26.8 PG (ref 27–31)
MCHC RBC AUTO-ENTMCNC: 30.8 G/DL (ref 32–36)
MCV RBC AUTO: 87 FL (ref 82–98)
MONOCYTES # BLD AUTO: 0.7 K/UL (ref 0.3–1)
MONOCYTES NFR BLD: 8.4 % (ref 4–15)
NEUTROPHILS # BLD AUTO: 5.9 K/UL (ref 1.8–7.7)
NEUTROPHILS NFR BLD: 69.3 % (ref 38–73)
NRBC BLD-RTO: 0 /100 WBC
OHS CV CPX 1 MINUTE RECOVERY HEART RATE: 120 BPM
OHS CV CPX 85 PERCENT MAX PREDICTED HEART RATE MALE: 137
OHS CV CPX ESTIMATED METS: 7
OHS CV CPX MAX PREDICTED HEART RATE: 161
OHS CV CPX PATIENT IS FEMALE: 1
OHS CV CPX PATIENT IS MALE: 0
OHS CV CPX PEAK DIASTOLIC BLOOD PRESSURE: 104 MMHG
OHS CV CPX PEAK HEAR RATE: 148 BPM
OHS CV CPX PEAK RATE PRESSURE PRODUCT: NORMAL
OHS CV CPX PEAK SYSTOLIC BLOOD PRESSURE: 166 MMHG
OHS CV CPX PERCENT MAX PREDICTED HEART RATE ACHIEVED: 92
OHS CV CPX RATE PRESSURE PRODUCT PRESENTING: 9920
PLATELET # BLD AUTO: 446 K/UL (ref 150–350)
PMV BLD AUTO: 10.2 FL (ref 9.2–12.9)
POTASSIUM SERPL-SCNC: 4.4 MMOL/L (ref 3.5–5.1)
PROT SERPL-MCNC: 7.7 G/DL (ref 6–8.4)
RBC # BLD AUTO: 4.29 M/UL (ref 4–5.4)
SODIUM SERPL-SCNC: 137 MMOL/L (ref 136–145)
STRESS ECHO POST EXERCISE DUR MIN: 5 MINUTES
STRESS ECHO POST EXERCISE DUR SEC: 44 SECONDS
SYSTOLIC BLOOD PRESSURE: 124 MMHG
TROPONIN I SERPL DL<=0.01 NG/ML-MCNC: <0.03 NG/ML
TROPONIN I SERPL DL<=0.01 NG/ML-MCNC: <0.03 NG/ML
WBC # BLD AUTO: 8.46 K/UL (ref 3.9–12.7)

## 2021-02-26 PROCEDURE — 99285 EMERGENCY DEPT VISIT HI MDM: CPT | Mod: 25

## 2021-02-26 PROCEDURE — 93010 EKG 12-LEAD: ICD-10-PCS | Mod: ,,, | Performed by: INTERNAL MEDICINE

## 2021-02-26 PROCEDURE — 85025 COMPLETE CBC W/AUTO DIFF WBC: CPT

## 2021-02-26 PROCEDURE — 93351 STRESS TTE COMPLETE: CPT

## 2021-02-26 PROCEDURE — 93010 ELECTROCARDIOGRAM REPORT: CPT | Mod: ,,, | Performed by: INTERNAL MEDICINE

## 2021-02-26 PROCEDURE — 85379 FIBRIN DEGRADATION QUANT: CPT

## 2021-02-26 PROCEDURE — 83880 ASSAY OF NATRIURETIC PEPTIDE: CPT

## 2021-02-26 PROCEDURE — 80053 COMPREHEN METABOLIC PANEL: CPT

## 2021-02-26 PROCEDURE — 93351 STRESS ECHO (CUPID ONLY): ICD-10-PCS | Mod: 26,,, | Performed by: INTERNAL MEDICINE

## 2021-02-26 PROCEDURE — G0378 HOSPITAL OBSERVATION PER HR: HCPCS

## 2021-02-26 PROCEDURE — 84484 ASSAY OF TROPONIN QUANT: CPT | Mod: 91

## 2021-02-26 PROCEDURE — 93005 ELECTROCARDIOGRAM TRACING: CPT | Performed by: INTERNAL MEDICINE

## 2021-02-26 PROCEDURE — 36415 COLL VENOUS BLD VENIPUNCTURE: CPT

## 2021-02-26 PROCEDURE — 93351 STRESS TTE COMPLETE: CPT | Mod: 26,,, | Performed by: INTERNAL MEDICINE

## 2021-02-26 RX ORDER — VALSARTAN 80 MG/1
160 TABLET ORAL 2 TIMES DAILY
Status: CANCELLED | OUTPATIENT
Start: 2021-02-26

## 2021-02-26 RX ORDER — PANTOPRAZOLE SODIUM 40 MG/1
40 TABLET, DELAYED RELEASE ORAL DAILY
Status: CANCELLED | OUTPATIENT
Start: 2021-02-26

## 2021-02-26 RX ORDER — DILTIAZEM HYDROCHLORIDE 60 MG/1
60 TABLET, FILM COATED ORAL 4 TIMES DAILY
COMMUNITY
End: 2021-05-17 | Stop reason: DRUGHIGH

## 2021-02-26 RX ORDER — ACETAMINOPHEN 325 MG/1
650 TABLET ORAL EVERY 8 HOURS PRN
Status: CANCELLED | OUTPATIENT
Start: 2021-02-26

## 2021-02-26 RX ORDER — ENOXAPARIN SODIUM 100 MG/ML
40 INJECTION SUBCUTANEOUS EVERY 24 HOURS
Status: CANCELLED | OUTPATIENT
Start: 2021-02-26

## 2021-02-26 RX ORDER — NITROGLYCERIN 0.4 MG/1
0.4 TABLET SUBLINGUAL EVERY 5 MIN PRN
Status: CANCELLED | OUTPATIENT
Start: 2021-02-26

## 2021-02-26 RX ORDER — IPRATROPIUM BROMIDE AND ALBUTEROL SULFATE 2.5; .5 MG/3ML; MG/3ML
3 SOLUTION RESPIRATORY (INHALATION) EVERY 6 HOURS PRN
Status: CANCELLED | OUTPATIENT
Start: 2021-02-26

## 2021-02-26 RX ORDER — LORAZEPAM 0.5 MG/1
0.5 TABLET ORAL DAILY PRN
Status: CANCELLED | OUTPATIENT
Start: 2021-02-26

## 2021-02-26 RX ORDER — HYDROCHLOROTHIAZIDE 12.5 MG/1
12.5 TABLET ORAL 2 TIMES DAILY
Status: CANCELLED | OUTPATIENT
Start: 2021-02-26

## 2021-02-26 RX ORDER — NAPROXEN SODIUM 220 MG/1
81 TABLET, FILM COATED ORAL DAILY
Status: CANCELLED | OUTPATIENT
Start: 2021-02-26

## 2021-02-26 RX ORDER — TALC
6 POWDER (GRAM) TOPICAL NIGHTLY PRN
Status: CANCELLED | OUTPATIENT
Start: 2021-02-26

## 2021-02-26 RX ORDER — POLYETHYLENE GLYCOL 3350 17 G/17G
17 POWDER, FOR SOLUTION ORAL DAILY PRN
Status: CANCELLED | OUTPATIENT
Start: 2021-02-26

## 2021-02-26 RX ORDER — ATORVASTATIN CALCIUM 40 MG/1
40 TABLET, FILM COATED ORAL DAILY
Status: CANCELLED | OUTPATIENT
Start: 2021-02-26

## 2021-02-26 RX ORDER — ACETAMINOPHEN 325 MG/1
650 TABLET ORAL EVERY 4 HOURS PRN
Status: CANCELLED | OUTPATIENT
Start: 2021-02-26

## 2021-02-26 RX ORDER — ONDANSETRON 2 MG/ML
4 INJECTION INTRAMUSCULAR; INTRAVENOUS EVERY 8 HOURS PRN
Status: CANCELLED | OUTPATIENT
Start: 2021-02-26

## 2021-02-26 RX ORDER — DILTIAZEM HYDROCHLORIDE 120 MG/1
240 CAPSULE, COATED, EXTENDED RELEASE ORAL DAILY
Status: CANCELLED | OUTPATIENT
Start: 2021-02-26

## 2021-02-26 RX ORDER — SODIUM CHLORIDE 0.9 % (FLUSH) 0.9 %
2 SYRINGE (ML) INJECTION
Status: CANCELLED | OUTPATIENT
Start: 2021-02-26

## 2021-04-12 DIAGNOSIS — I10 UNCONTROLLED HYPERTENSION: ICD-10-CM

## 2021-04-12 RX ORDER — VALSARTAN 160 MG/1
160 TABLET ORAL 2 TIMES DAILY
Qty: 180 TABLET | Refills: 0 | Status: SHIPPED | OUTPATIENT
Start: 2021-04-12 | End: 2021-11-01 | Stop reason: SDUPTHER

## 2021-05-10 ENCOUNTER — PATIENT MESSAGE (OUTPATIENT)
Dept: RESEARCH | Facility: HOSPITAL | Age: 51
End: 2021-05-10

## 2021-05-17 DIAGNOSIS — E78.00 PURE HYPERCHOLESTEROLEMIA: ICD-10-CM

## 2021-05-17 DIAGNOSIS — I10 UNCONTROLLED HYPERTENSION: ICD-10-CM

## 2021-05-17 RX ORDER — ATORVASTATIN CALCIUM 40 MG/1
40 TABLET, FILM COATED ORAL DAILY
Qty: 90 TABLET | Refills: 1 | Status: SHIPPED | OUTPATIENT
Start: 2021-05-17 | End: 2021-09-23

## 2021-05-17 RX ORDER — DILTIAZEM HYDROCHLORIDE 240 MG/1
240 CAPSULE, COATED, EXTENDED RELEASE ORAL DAILY
Qty: 90 CAPSULE | Refills: 1 | Status: SHIPPED | OUTPATIENT
Start: 2021-05-17 | End: 2021-09-23

## 2021-06-12 LAB
ALBUMIN SERPL-MCNC: 4.4 G/DL (ref 3.8–4.9)
ALBUMIN/GLOB SERPL: 1.3 {RATIO} (ref 1.2–2.2)
ALP SERPL-CCNC: 87 IU/L (ref 48–121)
ALT SERPL-CCNC: 16 IU/L (ref 0–32)
AST SERPL-CCNC: 18 IU/L (ref 0–40)
BASOPHILS # BLD AUTO: 0.1 X10E3/UL (ref 0–0.2)
BASOPHILS NFR BLD AUTO: 1 %
BILIRUB SERPL-MCNC: 0.4 MG/DL (ref 0–1.2)
BUN SERPL-MCNC: 13 MG/DL (ref 6–24)
BUN/CREAT SERPL: 19 (ref 9–23)
CALCIUM SERPL-MCNC: 9.8 MG/DL (ref 8.7–10.2)
CHLORIDE SERPL-SCNC: 99 MMOL/L (ref 96–106)
CHOLEST SERPL-MCNC: 172 MG/DL (ref 100–199)
CO2 SERPL-SCNC: 26 MMOL/L (ref 20–29)
CREAT SERPL-MCNC: 0.7 MG/DL (ref 0.57–1)
EOSINOPHIL # BLD AUTO: 0.3 X10E3/UL (ref 0–0.4)
EOSINOPHIL NFR BLD AUTO: 4 %
ERYTHROCYTE [DISTWIDTH] IN BLOOD BY AUTOMATED COUNT: 15 % (ref 11.7–15.4)
GLOBULIN SER CALC-MCNC: 3.3 G/DL (ref 1.5–4.5)
GLUCOSE SERPL-MCNC: 94 MG/DL (ref 65–99)
HCT VFR BLD AUTO: 39.9 % (ref 34–46.6)
HCV AB S/CO SERPL IA: <0.1 S/CO RATIO (ref 0–0.9)
HDLC SERPL-MCNC: 81 MG/DL
HGB BLD-MCNC: 12.4 G/DL (ref 11.1–15.9)
HIV 1+2 AB+HIV1 P24 AG SERPL QL IA: NON REACTIVE
IMM GRANULOCYTES # BLD AUTO: 0 X10E3/UL (ref 0–0.1)
IMM GRANULOCYTES NFR BLD AUTO: 0 %
LDLC SERPL CALC-MCNC: 79 MG/DL (ref 0–99)
LYMPHOCYTES # BLD AUTO: 1.7 X10E3/UL (ref 0.7–3.1)
LYMPHOCYTES NFR BLD AUTO: 24 %
MCH RBC QN AUTO: 26.8 PG (ref 26.6–33)
MCHC RBC AUTO-ENTMCNC: 31.1 G/DL (ref 31.5–35.7)
MCV RBC AUTO: 86 FL (ref 79–97)
MONOCYTES # BLD AUTO: 0.7 X10E3/UL (ref 0.1–0.9)
MONOCYTES NFR BLD AUTO: 10 %
NEUTROPHILS # BLD AUTO: 4.3 X10E3/UL (ref 1.4–7)
NEUTROPHILS NFR BLD AUTO: 61 %
PLATELET # BLD AUTO: 504 X10E3/UL (ref 150–450)
POTASSIUM SERPL-SCNC: 4.6 MMOL/L (ref 3.5–5.2)
PROT SERPL-MCNC: 7.7 G/DL (ref 6–8.5)
RBC # BLD AUTO: 4.62 X10E6/UL (ref 3.77–5.28)
SODIUM SERPL-SCNC: 139 MMOL/L (ref 134–144)
TRIGL SERPL-MCNC: 63 MG/DL (ref 0–149)
VLDLC SERPL CALC-MCNC: 12 MG/DL (ref 5–40)
WBC # BLD AUTO: 6.9 X10E3/UL (ref 3.4–10.8)

## 2021-08-16 ENCOUNTER — OFFICE VISIT (OUTPATIENT)
Dept: FAMILY MEDICINE | Facility: CLINIC | Age: 51
End: 2021-08-16
Payer: COMMERCIAL

## 2021-08-16 VITALS
TEMPERATURE: 99 F | RESPIRATION RATE: 14 BRPM | WEIGHT: 237 LBS | OXYGEN SATURATION: 98 % | SYSTOLIC BLOOD PRESSURE: 134 MMHG | BODY MASS INDEX: 40.46 KG/M2 | HEART RATE: 66 BPM | HEIGHT: 64 IN | DIASTOLIC BLOOD PRESSURE: 82 MMHG

## 2021-08-16 DIAGNOSIS — I10 ESSENTIAL HYPERTENSION: Primary | Chronic | ICD-10-CM

## 2021-08-16 DIAGNOSIS — K21.9 GASTROESOPHAGEAL REFLUX DISEASE WITHOUT ESOPHAGITIS: ICD-10-CM

## 2021-08-16 DIAGNOSIS — D50.0 IRON DEFICIENCY ANEMIA DUE TO CHRONIC BLOOD LOSS: ICD-10-CM

## 2021-08-16 DIAGNOSIS — F41.9 ANXIETY: Chronic | ICD-10-CM

## 2021-08-16 DIAGNOSIS — Z12.11 SPECIAL SCREENING FOR MALIGNANT NEOPLASMS, COLON: ICD-10-CM

## 2021-08-16 DIAGNOSIS — E78.00 PURE HYPERCHOLESTEROLEMIA: Chronic | ICD-10-CM

## 2021-08-16 PROCEDURE — 1126F AMNT PAIN NOTED NONE PRSNT: CPT | Mod: S$GLB,,, | Performed by: INTERNAL MEDICINE

## 2021-08-16 PROCEDURE — 3075F PR MOST RECENT SYSTOLIC BLOOD PRESS GE 130-139MM HG: ICD-10-PCS | Mod: S$GLB,,, | Performed by: INTERNAL MEDICINE

## 2021-08-16 PROCEDURE — 3079F DIAST BP 80-89 MM HG: CPT | Mod: S$GLB,,, | Performed by: INTERNAL MEDICINE

## 2021-08-16 PROCEDURE — 1160F RVW MEDS BY RX/DR IN RCRD: CPT | Mod: S$GLB,,, | Performed by: INTERNAL MEDICINE

## 2021-08-16 PROCEDURE — 3079F PR MOST RECENT DIASTOLIC BLOOD PRESSURE 80-89 MM HG: ICD-10-PCS | Mod: S$GLB,,, | Performed by: INTERNAL MEDICINE

## 2021-08-16 PROCEDURE — 3008F PR BODY MASS INDEX (BMI) DOCUMENTED: ICD-10-PCS | Mod: S$GLB,,, | Performed by: INTERNAL MEDICINE

## 2021-08-16 PROCEDURE — 3008F BODY MASS INDEX DOCD: CPT | Mod: S$GLB,,, | Performed by: INTERNAL MEDICINE

## 2021-08-16 PROCEDURE — 1126F PR PAIN SEVERITY QUANTIFIED, NO PAIN PRESENT: ICD-10-PCS | Mod: S$GLB,,, | Performed by: INTERNAL MEDICINE

## 2021-08-16 PROCEDURE — 3075F SYST BP GE 130 - 139MM HG: CPT | Mod: S$GLB,,, | Performed by: INTERNAL MEDICINE

## 2021-08-16 PROCEDURE — 99213 PR OFFICE/OUTPT VISIT, EST, LEVL III, 20-29 MIN: ICD-10-PCS | Mod: S$GLB,,, | Performed by: INTERNAL MEDICINE

## 2021-08-16 PROCEDURE — 1160F PR REVIEW ALL MEDS BY PRESCRIBER/CLIN PHARMACIST DOCUMENTED: ICD-10-PCS | Mod: S$GLB,,, | Performed by: INTERNAL MEDICINE

## 2021-08-16 PROCEDURE — 99213 OFFICE O/P EST LOW 20 MIN: CPT | Mod: S$GLB,,, | Performed by: INTERNAL MEDICINE

## 2021-08-16 RX ORDER — ALBUTEROL SULFATE 90 UG/1
2 AEROSOL, METERED RESPIRATORY (INHALATION) EVERY 6 HOURS PRN
COMMUNITY
End: 2022-02-17

## 2021-09-20 ENCOUNTER — PATIENT MESSAGE (OUTPATIENT)
Dept: FAMILY MEDICINE | Facility: CLINIC | Age: 51
End: 2021-09-20

## 2021-09-20 DIAGNOSIS — R05.9 COUGH: Primary | ICD-10-CM

## 2021-09-27 ENCOUNTER — PATIENT MESSAGE (OUTPATIENT)
Dept: FAMILY MEDICINE | Facility: CLINIC | Age: 51
End: 2021-09-27

## 2021-09-27 ENCOUNTER — OFFICE VISIT (OUTPATIENT)
Dept: URGENT CARE | Facility: CLINIC | Age: 51
End: 2021-09-27
Payer: COMMERCIAL

## 2021-09-27 VITALS
DIASTOLIC BLOOD PRESSURE: 85 MMHG | TEMPERATURE: 98 F | HEIGHT: 64 IN | HEART RATE: 83 BPM | RESPIRATION RATE: 20 BRPM | SYSTOLIC BLOOD PRESSURE: 130 MMHG | OXYGEN SATURATION: 97 % | BODY MASS INDEX: 41.32 KG/M2 | WEIGHT: 242 LBS

## 2021-09-27 DIAGNOSIS — Z20.822 COVID-19 VIRUS NOT DETECTED: ICD-10-CM

## 2021-09-27 DIAGNOSIS — J32.9 SINUSITIS, UNSPECIFIED CHRONICITY, UNSPECIFIED LOCATION: ICD-10-CM

## 2021-09-27 DIAGNOSIS — R05.9 COUGH: Primary | ICD-10-CM

## 2021-09-27 LAB
CTP QC/QA: YES
SARS-COV-2 RDRP RESP QL NAA+PROBE: NEGATIVE

## 2021-09-27 PROCEDURE — 3008F PR BODY MASS INDEX (BMI) DOCUMENTED: ICD-10-PCS | Mod: CPTII,S$GLB,, | Performed by: NURSE PRACTITIONER

## 2021-09-27 PROCEDURE — 1159F MED LIST DOCD IN RCRD: CPT | Mod: CPTII,S$GLB,, | Performed by: NURSE PRACTITIONER

## 2021-09-27 PROCEDURE — 3079F PR MOST RECENT DIASTOLIC BLOOD PRESSURE 80-89 MM HG: ICD-10-PCS | Mod: CPTII,S$GLB,, | Performed by: NURSE PRACTITIONER

## 2021-09-27 PROCEDURE — 3075F SYST BP GE 130 - 139MM HG: CPT | Mod: CPTII,S$GLB,, | Performed by: NURSE PRACTITIONER

## 2021-09-27 PROCEDURE — 3075F PR MOST RECENT SYSTOLIC BLOOD PRESS GE 130-139MM HG: ICD-10-PCS | Mod: CPTII,S$GLB,, | Performed by: NURSE PRACTITIONER

## 2021-09-27 PROCEDURE — 99213 OFFICE O/P EST LOW 20 MIN: CPT | Mod: S$GLB,,, | Performed by: NURSE PRACTITIONER

## 2021-09-27 PROCEDURE — U0002: ICD-10-PCS | Mod: QW,S$GLB,, | Performed by: NURSE PRACTITIONER

## 2021-09-27 PROCEDURE — 1159F PR MEDICATION LIST DOCUMENTED IN MEDICAL RECORD: ICD-10-PCS | Mod: CPTII,S$GLB,, | Performed by: NURSE PRACTITIONER

## 2021-09-27 PROCEDURE — 99213 PR OFFICE/OUTPT VISIT, EST, LEVL III, 20-29 MIN: ICD-10-PCS | Mod: S$GLB,,, | Performed by: NURSE PRACTITIONER

## 2021-09-27 PROCEDURE — 4010F PR ACE/ARB THEARPY RXD/TAKEN: ICD-10-PCS | Mod: CPTII,S$GLB,, | Performed by: NURSE PRACTITIONER

## 2021-09-27 PROCEDURE — 1160F RVW MEDS BY RX/DR IN RCRD: CPT | Mod: CPTII,S$GLB,, | Performed by: NURSE PRACTITIONER

## 2021-09-27 PROCEDURE — 3079F DIAST BP 80-89 MM HG: CPT | Mod: CPTII,S$GLB,, | Performed by: NURSE PRACTITIONER

## 2021-09-27 PROCEDURE — 3008F BODY MASS INDEX DOCD: CPT | Mod: CPTII,S$GLB,, | Performed by: NURSE PRACTITIONER

## 2021-09-27 PROCEDURE — U0002 COVID-19 LAB TEST NON-CDC: HCPCS | Mod: QW,S$GLB,, | Performed by: NURSE PRACTITIONER

## 2021-09-27 PROCEDURE — 4010F ACE/ARB THERAPY RXD/TAKEN: CPT | Mod: CPTII,S$GLB,, | Performed by: NURSE PRACTITIONER

## 2021-09-27 PROCEDURE — 1160F PR REVIEW ALL MEDS BY PRESCRIBER/CLIN PHARMACIST DOCUMENTED: ICD-10-PCS | Mod: CPTII,S$GLB,, | Performed by: NURSE PRACTITIONER

## 2021-09-27 RX ORDER — ALBUTEROL SULFATE 90 UG/1
2 AEROSOL, METERED RESPIRATORY (INHALATION) EVERY 6 HOURS PRN
Qty: 18 G | Refills: 0 | Status: SHIPPED | OUTPATIENT
Start: 2021-09-27 | End: 2023-10-04

## 2021-09-27 RX ORDER — CETIRIZINE HYDROCHLORIDE 10 MG/1
10 TABLET ORAL DAILY
Qty: 30 TABLET | Refills: 0 | Status: SHIPPED | OUTPATIENT
Start: 2021-09-27 | End: 2022-04-04

## 2021-09-27 RX ORDER — FLUTICASONE PROPIONATE 50 MCG
1 SPRAY, SUSPENSION (ML) NASAL DAILY
Qty: 15.8 ML | Refills: 0 | Status: SHIPPED | OUTPATIENT
Start: 2021-09-27

## 2021-09-27 RX ORDER — BENZONATATE 100 MG/1
100 CAPSULE ORAL 3 TIMES DAILY PRN
Qty: 30 CAPSULE | Refills: 0 | Status: SHIPPED | OUTPATIENT
Start: 2021-09-27 | End: 2021-10-07

## 2021-09-27 RX ORDER — DOXYCYCLINE 100 MG/1
100 CAPSULE ORAL 2 TIMES DAILY
Qty: 14 CAPSULE | Refills: 0 | Status: SHIPPED | OUTPATIENT
Start: 2021-09-27 | End: 2021-10-04

## 2021-10-20 ENCOUNTER — PATIENT MESSAGE (OUTPATIENT)
Dept: FAMILY MEDICINE | Facility: CLINIC | Age: 51
End: 2021-10-20
Payer: COMMERCIAL

## 2022-02-16 ENCOUNTER — TELEPHONE (OUTPATIENT)
Dept: FAMILY MEDICINE | Facility: CLINIC | Age: 52
End: 2022-02-16
Payer: COMMERCIAL

## 2022-02-17 ENCOUNTER — TELEPHONE (OUTPATIENT)
Dept: FAMILY MEDICINE | Facility: CLINIC | Age: 52
End: 2022-02-17
Payer: COMMERCIAL

## 2022-02-17 DIAGNOSIS — E03.9 ACQUIRED HYPOTHYROIDISM: ICD-10-CM

## 2022-02-17 DIAGNOSIS — I10 ESSENTIAL HYPERTENSION: Primary | ICD-10-CM

## 2022-02-17 RX ORDER — PHENTERMINE HYDROCHLORIDE 37.5 MG/1
37.5 TABLET ORAL EVERY MORNING
COMMUNITY
Start: 2021-10-18 | End: 2022-04-04

## 2022-02-17 NOTE — TELEPHONE ENCOUNTER
Patient had a few labs done at work, lipid and glucose. I have rest of her labs pended that she needs for her appt.

## 2022-03-30 LAB
ALBUMIN SERPL-MCNC: 4 G/DL (ref 3.6–5.1)
ALBUMIN/GLOB SERPL: 1.1 (CALC) (ref 1–2.5)
ALP SERPL-CCNC: 73 U/L (ref 37–153)
ALT SERPL-CCNC: 10 U/L (ref 6–29)
AST SERPL-CCNC: 14 U/L (ref 10–35)
BASOPHILS # BLD AUTO: 21 CELLS/UL (ref 0–200)
BASOPHILS NFR BLD AUTO: 0.3 %
BILIRUB SERPL-MCNC: 0.6 MG/DL (ref 0.2–1.2)
BUN SERPL-MCNC: 7 MG/DL (ref 7–25)
BUN/CREAT SERPL: NORMAL (CALC) (ref 6–22)
CALCIUM SERPL-MCNC: 9.6 MG/DL (ref 8.6–10.4)
CHLORIDE SERPL-SCNC: 101 MMOL/L (ref 98–110)
CO2 SERPL-SCNC: 28 MMOL/L (ref 20–32)
CREAT SERPL-MCNC: 0.58 MG/DL (ref 0.5–1.05)
EOSINOPHIL # BLD AUTO: 133 CELLS/UL (ref 15–500)
EOSINOPHIL NFR BLD AUTO: 1.9 %
ERYTHROCYTE [DISTWIDTH] IN BLOOD BY AUTOMATED COUNT: 14.8 % (ref 11–15)
GLOBULIN SER CALC-MCNC: 3.5 G/DL (CALC) (ref 1.9–3.7)
GLUCOSE SERPL-MCNC: 87 MG/DL (ref 65–99)
HCT VFR BLD AUTO: 38 % (ref 35–45)
HGB BLD-MCNC: 12 G/DL (ref 11.7–15.5)
LYMPHOCYTES # BLD AUTO: 2037 CELLS/UL (ref 850–3900)
LYMPHOCYTES NFR BLD AUTO: 29.1 %
MCH RBC QN AUTO: 26.2 PG (ref 27–33)
MCHC RBC AUTO-ENTMCNC: 31.6 G/DL (ref 32–36)
MCV RBC AUTO: 83 FL (ref 80–100)
MONOCYTES # BLD AUTO: 588 CELLS/UL (ref 200–950)
MONOCYTES NFR BLD AUTO: 8.4 %
NEUTROPHILS # BLD AUTO: 4221 CELLS/UL (ref 1500–7800)
NEUTROPHILS NFR BLD AUTO: 60.3 %
PLATELET # BLD AUTO: 479 THOUSAND/UL (ref 140–400)
PMV BLD REES-ECKER: 10.6 FL (ref 7.5–12.5)
POTASSIUM SERPL-SCNC: 4 MMOL/L (ref 3.5–5.3)
PROT SERPL-MCNC: 7.5 G/DL (ref 6.1–8.1)
RBC # BLD AUTO: 4.58 MILLION/UL (ref 3.8–5.1)
SODIUM SERPL-SCNC: 137 MMOL/L (ref 135–146)
TSH SERPL-ACNC: 2.03 MIU/L
WBC # BLD AUTO: 7 THOUSAND/UL (ref 3.8–10.8)

## 2022-04-04 ENCOUNTER — OFFICE VISIT (OUTPATIENT)
Dept: FAMILY MEDICINE | Facility: CLINIC | Age: 52
End: 2022-04-04
Payer: COMMERCIAL

## 2022-04-04 VITALS
WEIGHT: 241 LBS | DIASTOLIC BLOOD PRESSURE: 86 MMHG | BODY MASS INDEX: 41.15 KG/M2 | RESPIRATION RATE: 16 BRPM | OXYGEN SATURATION: 97 % | SYSTOLIC BLOOD PRESSURE: 132 MMHG | HEART RATE: 66 BPM | HEIGHT: 64 IN

## 2022-04-04 DIAGNOSIS — F41.9 ANXIETY: ICD-10-CM

## 2022-04-04 DIAGNOSIS — I10 ESSENTIAL HYPERTENSION: Primary | ICD-10-CM

## 2022-04-04 DIAGNOSIS — E78.00 PURE HYPERCHOLESTEROLEMIA: ICD-10-CM

## 2022-04-04 PROCEDURE — 4010F PR ACE/ARB THEARPY RXD/TAKEN: ICD-10-PCS | Mod: S$GLB,,, | Performed by: INTERNAL MEDICINE

## 2022-04-04 PROCEDURE — 1160F PR REVIEW ALL MEDS BY PRESCRIBER/CLIN PHARMACIST DOCUMENTED: ICD-10-PCS | Mod: S$GLB,,, | Performed by: INTERNAL MEDICINE

## 2022-04-04 PROCEDURE — 99214 OFFICE O/P EST MOD 30 MIN: CPT | Mod: S$GLB,,, | Performed by: INTERNAL MEDICINE

## 2022-04-04 PROCEDURE — 4010F ACE/ARB THERAPY RXD/TAKEN: CPT | Mod: S$GLB,,, | Performed by: INTERNAL MEDICINE

## 2022-04-04 PROCEDURE — 3008F PR BODY MASS INDEX (BMI) DOCUMENTED: ICD-10-PCS | Mod: S$GLB,,, | Performed by: INTERNAL MEDICINE

## 2022-04-04 PROCEDURE — 3075F PR MOST RECENT SYSTOLIC BLOOD PRESS GE 130-139MM HG: ICD-10-PCS | Mod: S$GLB,,, | Performed by: INTERNAL MEDICINE

## 2022-04-04 PROCEDURE — 3075F SYST BP GE 130 - 139MM HG: CPT | Mod: S$GLB,,, | Performed by: INTERNAL MEDICINE

## 2022-04-04 PROCEDURE — 3079F DIAST BP 80-89 MM HG: CPT | Mod: S$GLB,,, | Performed by: INTERNAL MEDICINE

## 2022-04-04 PROCEDURE — 3079F PR MOST RECENT DIASTOLIC BLOOD PRESSURE 80-89 MM HG: ICD-10-PCS | Mod: S$GLB,,, | Performed by: INTERNAL MEDICINE

## 2022-04-04 PROCEDURE — 1160F RVW MEDS BY RX/DR IN RCRD: CPT | Mod: S$GLB,,, | Performed by: INTERNAL MEDICINE

## 2022-04-04 PROCEDURE — 99214 PR OFFICE/OUTPT VISIT, EST, LEVL IV, 30-39 MIN: ICD-10-PCS | Mod: S$GLB,,, | Performed by: INTERNAL MEDICINE

## 2022-04-04 PROCEDURE — 3008F BODY MASS INDEX DOCD: CPT | Mod: S$GLB,,, | Performed by: INTERNAL MEDICINE

## 2022-04-04 RX ORDER — LORAZEPAM 0.5 MG/1
0.5 TABLET ORAL DAILY PRN
Qty: 90 TABLET | Refills: 0 | Status: SHIPPED | OUTPATIENT
Start: 2022-04-04 | End: 2023-04-04 | Stop reason: SDUPTHER

## 2022-04-04 RX ORDER — HYDROCHLOROTHIAZIDE 12.5 MG/1
12.5 TABLET ORAL 2 TIMES DAILY
Qty: 180 TABLET | Refills: 3 | Status: SHIPPED | OUTPATIENT
Start: 2022-04-04 | End: 2023-05-31 | Stop reason: SDUPTHER

## 2022-04-04 NOTE — PROGRESS NOTES
SUBJECTIVE:    Patient ID: Jalyn Dickson is a 52 y.o. female.    Chief Complaint: Medication Refill, Results, and Follow-up    HPI     Patient in for follow-up on blood pressure and to review laboratory studies--BP good-120-130/mid 70-80 at home-no fluid retention-no chest pain or palpitations    Anxiety managed with present medication    Laboratory studies included WBC of 7 hemoglobin 12 hematocrit 38 platelets [id number] BUN 7 creatinine 0.58 glucose 87 sodium 137 potassium 4.0 TSH is 2.03    She is having pain from right olecranon bursitis    She just wants to lose weight    Telephone on 02/17/2022   Component Date Value Ref Range Status    WBC 03/29/2022 7.0  3.8 - 10.8 Thousand/uL Final    RBC 03/29/2022 4.58  3.80 - 5.10 Million/uL Final    Hemoglobin 03/29/2022 12.0  11.7 - 15.5 g/dL Final    Hematocrit 03/29/2022 38.0  35.0 - 45.0 % Final    MCV 03/29/2022 83.0  80.0 - 100.0 fL Final    MCH 03/29/2022 26.2 (A) 27.0 - 33.0 pg Final    MCHC 03/29/2022 31.6 (A) 32.0 - 36.0 g/dL Final    RDW 03/29/2022 14.8  11.0 - 15.0 % Final    Platelets 03/29/2022 479 (A) 140 - 400 Thousand/uL Final    MPV 03/29/2022 10.6  7.5 - 12.5 fL Final    Neutrophils, Abs 03/29/2022 4,221  1,500 - 7,800 cells/uL Final    Lymph # 03/29/2022 2,037  850 - 3,900 cells/uL Final    Mono # 03/29/2022 588  200 - 950 cells/uL Final    Eos # 03/29/2022 133  15 - 500 cells/uL Final    Baso # 03/29/2022 21  0 - 200 cells/uL Final    Neutrophils Relative 03/29/2022 60.3  % Final    Lymph % 03/29/2022 29.1  % Final    Mono % 03/29/2022 8.4  % Final    Eosinophil % 03/29/2022 1.9  % Final    Basophil % 03/29/2022 0.3  % Final    Glucose 03/29/2022 87  65 - 99 mg/dL Final    BUN 03/29/2022 7  7 - 25 mg/dL Final    Creatinine 03/29/2022 0.58  0.50 - 1.05 mg/dL Final    eGFR if non African American 03/29/2022 106  > OR = 60 mL/min/1.73m2 Final    eGFR if  03/29/2022 123  > OR = 60 mL/min/1.73m2 Final     BUN/Creatinine Ratio 03/29/2022 NOT APPLICABLE  6 - 22 (calc) Final    Sodium 03/29/2022 137  135 - 146 mmol/L Final    Potassium 03/29/2022 4.0  3.5 - 5.3 mmol/L Final    Chloride 03/29/2022 101  98 - 110 mmol/L Final    CO2 03/29/2022 28  20 - 32 mmol/L Final    Calcium 03/29/2022 9.6  8.6 - 10.4 mg/dL Final    Total Protein 03/29/2022 7.5  6.1 - 8.1 g/dL Final    Albumin 03/29/2022 4.0  3.6 - 5.1 g/dL Final    Globulin, Total 03/29/2022 3.5  1.9 - 3.7 g/dL (calc) Final    Albumin/Globulin Ratio 03/29/2022 1.1  1.0 - 2.5 (calc) Final    Total Bilirubin 03/29/2022 0.6  0.2 - 1.2 mg/dL Final    Alkaline Phosphatase 03/29/2022 73  37 - 153 U/L Final    AST 03/29/2022 14  10 - 35 U/L Final    ALT 03/29/2022 10  6 - 29 U/L Final    TSH 03/29/2022 2.03  mIU/L Final   Office Visit on 09/27/2021   Component Date Value Ref Range Status    POC Rapid COVID 09/27/2021 Negative  Negative Final     Acceptable 09/27/2021 Yes   Final       Past Medical History:   Diagnosis Date    Acid reflux     Allergy     Anxiety     Hypertension     Neck mass     Pulmonary embolism     Sinus drainage      Past Surgical History:   Procedure Laterality Date    NECK LESION BIOPSY      neck mass removal      TUBAL LIGATION  1999     Family History   Problem Relation Age of Onset    Heart disease Mother     Coronary artery disease Father     Peripheral vascular disease Father     Diabetes Father     Hypertension Father     Hypertension Maternal Grandmother     No Known Problems Maternal Grandfather     PDD Paternal Grandfather     Hypertension Paternal Grandfather     No Known Problems Son        Marital Status:   Alcohol History:  reports current alcohol use of about 2.0 standard drinks of alcohol per week.  Tobacco History:  reports that she has never smoked. She has never used smokeless tobacco.  Drug History:  reports current drug use.    Review of patient's allergies indicates:  No  Known Allergies    Current Outpatient Medications:     albuterol (VENTOLIN HFA) 90 mcg/actuation inhaler, Inhale 2 puffs into the lungs every 6 (six) hours as needed for Wheezing. Rescue, Disp: 18 g, Rfl: 0    ascorbic acid, vitamin C, (VITAMIN C) 1000 MG tablet, Take 1,000 mg by mouth once daily., Disp: , Rfl:     aspirin 81 MG Chew, Take 81 mg by mouth once daily., Disp: , Rfl:     atorvastatin (LIPITOR) 40 MG tablet, TAKE 1 TABLET EVERY DAY, Disp: 90 tablet, Rfl: 1    b complex vitamins tablet, Take 1 tablet by mouth once daily., Disp: , Rfl:     cyanocobalamin (VITAMIN B-12) 1000 MCG tablet, Take 100 mcg by mouth once daily., Disp: , Rfl:     diltiaZEM (CARDIZEM CD) 240 MG 24 hr capsule, TAKE 1 CAPSULE EVERY DAY, Disp: 90 capsule, Rfl: 1    fluticasone propionate (FLONASE) 50 mcg/actuation nasal spray, 1 spray (50 mcg total) by Each Nostril route once daily., Disp: 15.8 mL, Rfl: 0    GENERLAC 10 gram/15 mL solution, Take 10 g by mouth 2 (two) times daily. , Disp: , Rfl:     ipratropium (ATROVENT HFA) 17 mcg/actuation inhaler, Inhale 2 puffs into the lungs every 6 (six) hours. Rescue, Disp: 12.9 g, Rfl: 0    levocetirizine (XYZAL) 5 MG tablet, Take 5 mg by mouth every evening., Disp: , Rfl:     omeprazole (PRILOSEC) 20 MG capsule, Take 20 mg by mouth once daily., Disp: , Rfl:     valsartan (DIOVAN) 160 MG tablet, TAKE 1 TABLET TWICE DAILY, Disp: 180 tablet, Rfl: 0    vitamin E 400 UNIT capsule, Take 400 Units by mouth once daily., Disp: , Rfl:     zinc sulfate (ZINCATE) 50 mg zinc (220 mg) capsule, Take 1 capsule by mouth once daily., Disp: , Rfl:     hydroCHLOROthiazide (HYDRODIURIL) 12.5 MG Tab, Take 1 tablet (12.5 mg total) by mouth 2 (two) times a day., Disp: 180 tablet, Rfl: 3    LORazepam (ATIVAN) 0.5 MG tablet, Take 1 tablet (0.5 mg total) by mouth daily as needed for Anxiety., Disp: 90 tablet, Rfl: 0    Review of Systems   Constitutional: Negative for activity change, appetite change,  "chills, fatigue, fever and unexpected weight change.   HENT: Negative for congestion, ear pain, hearing loss, postnasal drip, sinus pain, sneezing, sore throat, tinnitus and trouble swallowing.    Eyes: Negative for pain, discharge and visual disturbance.   Respiratory: Negative for cough, choking, shortness of breath and wheezing.    Cardiovascular: Negative for chest pain, palpitations and leg swelling.   Gastrointestinal: Negative for abdominal distention, abdominal pain, blood in stool, constipation, diarrhea, nausea and vomiting.   Endocrine: Negative for cold intolerance and heat intolerance.   Genitourinary: Negative for difficulty urinating, dysuria, frequency, pelvic pain and urgency.   Musculoskeletal: Positive for arthralgias (post COVID-elbow, fingers). Negative for back pain, joint swelling and neck pain.   Skin: Negative for pallor and rash.   Allergic/Immunologic: Negative for environmental allergies and food allergies.   Neurological: Positive for numbness (sharp pains in the right foot nightly, post COVID). Negative for dizziness, tremors, weakness and headaches.   Hematological: Does not bruise/bleed easily.   Psychiatric/Behavioral: Negative for agitation, confusion, dysphoric mood, sleep disturbance and suicidal ideas. The patient is not nervous/anxious.           Objective:      Vitals:    04/04/22 1727   BP: 132/86   Pulse: 66   Resp: 16   SpO2: 97%   Weight: 109.3 kg (241 lb)   Height: 5' 4" (1.626 m)     Physical Exam  Constitutional:       General: She is not in acute distress.     Appearance: Normal appearance. She is obese. She is not ill-appearing, toxic-appearing or diaphoretic.   HENT:      Head: Normocephalic and atraumatic.   Eyes:      Extraocular Movements: Extraocular movements intact.      Pupils: Pupils are equal, round, and reactive to light.   Neck:      Vascular: No carotid bruit.   Cardiovascular:      Rate and Rhythm: Normal rate and regular rhythm.      Pulses: Normal " pulses.   Pulmonary:      Effort: Pulmonary effort is normal.      Breath sounds: Normal breath sounds.   Abdominal:      General: Bowel sounds are normal.   Musculoskeletal:      Right lower leg: No edema.      Left lower leg: No edema.      Comments: Right elbow in sling   Lymphadenopathy:      Cervical: No cervical adenopathy.   Skin:     General: Skin is warm and dry.   Neurological:      General: No focal deficit present.      Mental Status: She is alert and oriented to person, place, and time.   Psychiatric:         Mood and Affect: Mood normal.         Thought Content: Thought content normal.           Assessment:       1. Essential hypertension    2. Anxiety    3. Pure hypercholesterolemia         Plan:       Essential hypertension  -     hydroCHLOROthiazide (HYDRODIURIL) 12.5 MG Tab; Take 1 tablet (12.5 mg total) by mouth 2 (two) times a day.  Dispense: 180 tablet; Refill: 3    Anxiety  -     LORazepam (ATIVAN) 0.5 MG tablet; Take 1 tablet (0.5 mg total) by mouth daily as needed for Anxiety.  Dispense: 90 tablet; Refill: 0    Pure hypercholesterolemia        -     Continue as is      No follow-ups on file.        4/4/2022 Rachel Wagner M.D.

## 2022-07-12 ENCOUNTER — PATIENT MESSAGE (OUTPATIENT)
Dept: FAMILY MEDICINE | Facility: CLINIC | Age: 52
End: 2022-07-12

## 2022-10-02 NOTE — PROGRESS NOTES
SUBJECTIVE:    Patient ID: Jalyn Dickson is a 52 y.o. female.    Chief Complaint: Medication Refill, Hypertension, and Follow-up    HPI    Follow-up BP-no issues-the BP has been controlled-she is not adhering to a low sodium diet, however-she has no issues with edema-seh is not on a weight loss diet-she denies any chest pain or palpitations, no headaches or visual issues-    Telephone on 02/17/2022   Component Date Value Ref Range Status    WBC 03/29/2022 7.0  3.8 - 10.8 Thousand/uL Final    RBC 03/29/2022 4.58  3.80 - 5.10 Million/uL Final    Hemoglobin 03/29/2022 12.0  11.7 - 15.5 g/dL Final    Hematocrit 03/29/2022 38.0  35.0 - 45.0 % Final    MCV 03/29/2022 83.0  80.0 - 100.0 fL Final    MCH 03/29/2022 26.2 (L)  27.0 - 33.0 pg Final    MCHC 03/29/2022 31.6 (L)  32.0 - 36.0 g/dL Final    RDW 03/29/2022 14.8  11.0 - 15.0 % Final    Platelets 03/29/2022 479 (H)  140 - 400 Thousand/uL Final    MPV 03/29/2022 10.6  7.5 - 12.5 fL Final    Neutrophils, Abs 03/29/2022 4,221  1,500 - 7,800 cells/uL Final    Lymph # 03/29/2022 2,037  850 - 3,900 cells/uL Final    Mono # 03/29/2022 588  200 - 950 cells/uL Final    Eos # 03/29/2022 133  15 - 500 cells/uL Final    Baso # 03/29/2022 21  0 - 200 cells/uL Final    Neutrophils Relative 03/29/2022 60.3  % Final    Lymph % 03/29/2022 29.1  % Final    Mono % 03/29/2022 8.4  % Final    Eosinophil % 03/29/2022 1.9  % Final    Basophil % 03/29/2022 0.3  % Final    Glucose 03/29/2022 87  65 - 99 mg/dL Final    BUN 03/29/2022 7  7 - 25 mg/dL Final    Creatinine 03/29/2022 0.58  0.50 - 1.05 mg/dL Final    eGFR if non African American 03/29/2022 106  > OR = 60 mL/min/1.73m2 Final    eGFR if African American 03/29/2022 123  > OR = 60 mL/min/1.73m2 Final    BUN/Creatinine Ratio 03/29/2022 NOT APPLICABLE  6 - 22 (calc) Final    Sodium 03/29/2022 137  135 - 146 mmol/L Final    Potassium 03/29/2022 4.0  3.5 - 5.3 mmol/L Final    Chloride 03/29/2022 101  98 - 110 mmol/L Final    CO2  03/29/2022 28  20 - 32 mmol/L Final    Calcium 03/29/2022 9.6  8.6 - 10.4 mg/dL Final    Total Protein 03/29/2022 7.5  6.1 - 8.1 g/dL Final    Albumin 03/29/2022 4.0  3.6 - 5.1 g/dL Final    Globulin, Total 03/29/2022 3.5  1.9 - 3.7 g/dL (calc) Final    Albumin/Globulin Ratio 03/29/2022 1.1  1.0 - 2.5 (calc) Final    Total Bilirubin 03/29/2022 0.6  0.2 - 1.2 mg/dL Final    Alkaline Phosphatase 03/29/2022 73  37 - 153 U/L Final    AST 03/29/2022 14  10 - 35 U/L Final    ALT 03/29/2022 10  6 - 29 U/L Final    TSH 03/29/2022 2.03  mIU/L Final       Past Medical History:   Diagnosis Date    Acid reflux     Allergy     Anxiety     Hypertension     Neck mass     Pulmonary embolism     Sinus drainage      Past Surgical History:   Procedure Laterality Date    NECK LESION BIOPSY      neck mass removal      TUBAL LIGATION  1999     Family History   Problem Relation Age of Onset    Heart disease Mother     Coronary artery disease Father     Peripheral vascular disease Father     Diabetes Father     Hypertension Father     Hypertension Maternal Grandmother     No Known Problems Maternal Grandfather     PDD Paternal Grandfather     Hypertension Paternal Grandfather     No Known Problems Son        Marital Status:   Alcohol History:  reports current alcohol use of about 2.0 standard drinks per week.  Tobacco History:  reports that she has never smoked. She has never used smokeless tobacco.  Drug History:  reports current drug use.    Review of patient's allergies indicates:  No Known Allergies    Current Outpatient Medications:     albuterol (VENTOLIN HFA) 90 mcg/actuation inhaler, Inhale 2 puffs into the lungs every 6 (six) hours as needed for Wheezing. Rescue, Disp: 18 g, Rfl: 0    ascorbic acid, vitamin C, (VITAMIN C) 1000 MG tablet, Take 1,000 mg by mouth once daily., Disp: , Rfl:     aspirin 81 MG Chew, Take 81 mg by mouth once daily., Disp: , Rfl:     atorvastatin (LIPITOR) 40 MG tablet, TAKE 1 TABLET EVERY DAY,  Disp: 90 tablet, Rfl: 0    b complex vitamins tablet, Take 1 tablet by mouth once daily., Disp: , Rfl:     cyanocobalamin (VITAMIN B-12) 1000 MCG tablet, Take 100 mcg by mouth once daily., Disp: , Rfl:     diltiaZEM (CARDIZEM CD) 240 MG 24 hr capsule, TAKE 1 CAPSULE EVERY DAY, Disp: 90 capsule, Rfl: 0    fluticasone propionate (FLONASE) 50 mcg/actuation nasal spray, 1 spray (50 mcg total) by Each Nostril route once daily., Disp: 15.8 mL, Rfl: 0    GENERLAC 10 gram/15 mL solution, Take 10 g by mouth 2 (two) times daily. , Disp: , Rfl:     hydroCHLOROthiazide (HYDRODIURIL) 12.5 MG Tab, Take 1 tablet (12.5 mg total) by mouth 2 (two) times a day., Disp: 180 tablet, Rfl: 3    ipratropium (ATROVENT HFA) 17 mcg/actuation inhaler, Inhale 2 puffs into the lungs every 6 (six) hours. Rescue, Disp: 12.9 g, Rfl: 0    levocetirizine (XYZAL) 5 MG tablet, Take 5 mg by mouth every evening., Disp: , Rfl:     LORazepam (ATIVAN) 0.5 MG tablet, Take 1 tablet (0.5 mg total) by mouth daily as needed for Anxiety., Disp: 90 tablet, Rfl: 0    omeprazole (PRILOSEC) 20 MG capsule, Take 20 mg by mouth once daily., Disp: , Rfl:     valsartan (DIOVAN) 160 MG tablet, TAKE 1 TABLET TWICE DAILY, Disp: 180 tablet, Rfl: 0    vitamin E 400 UNIT capsule, Take 400 Units by mouth once daily., Disp: , Rfl:     zinc sulfate (ZINCATE) 50 mg zinc (220 mg) capsule, Take 1 capsule by mouth once daily., Disp: , Rfl:     adjuvant AS01B, PF,vial 1 of 2 (SHINGRIX ADJUVANT COMPONENT-PF) Susp, Inject 0.5 mLs into the muscle once. for 1 dose, Disp: 0.5 mL, Rfl: 0    Review of Systems   Constitutional:  Negative for activity change, appetite change, chills, fatigue, fever and unexpected weight change.   HENT:  Positive for tinnitus. Negative for congestion, ear pain, hearing loss, postnasal drip, sinus pain, sneezing, sore throat and trouble swallowing.    Eyes:  Negative for pain, discharge and visual disturbance.   Respiratory:  Negative for cough, choking, shortness  of breath and wheezing.    Cardiovascular:  Negative for chest pain, palpitations and leg swelling.   Gastrointestinal:  Negative for abdominal distention, abdominal pain, blood in stool, constipation, diarrhea, nausea and vomiting.   Endocrine: Negative for cold intolerance and heat intolerance.   Genitourinary:  Negative for difficulty urinating, dysuria, frequency, pelvic pain and urgency.   Musculoskeletal:  Negative for back pain, joint swelling and neck pain.   Skin:  Negative for pallor and rash.   Allergic/Immunologic: Negative for environmental allergies and food allergies.   Neurological:  Negative for dizziness, tremors, weakness, numbness and headaches.   Hematological:  Does not bruise/bleed easily.   Psychiatric/Behavioral:  Negative for agitation, confusion, dysphoric mood, sleep disturbance and suicidal ideas. The patient is not nervous/anxious.         Objective:      Vitals    Vitals - 1 value per visit 9/27/2021 4/4/2022 4/4/2022 10/4/2022 10/4/2022   SYSTOLIC 130 - 132 - 128   DIASTOLIC 85 - 86 - 80   Pulse 83 - 66 - 66   Temp 97.6 - - - 98.3   Resp 20 - 16 - 16   SPO2 97 - 97 - 97   Weight (lb) 242 - 241 - 241   Weight (kg) 109.77 - 109.317 - 109.317   Height 64 - 64 - 64   BMI (Calculated) 41.5 - 41.3 - 41.3   VISIT REPORT - - - - -   Pain Score  - 4 - 0 -       Physical Exam      Assessment:       1. Essential hypertension    2. Pure hypercholesterolemia    3. Iron deficiency anemia due to chronic blood loss    4. Acquired hypothyroidism    5. Flu vaccine need    6. Need for shingles vaccine         Plan:       Essential hypertension  -     CBC Auto Differential; Future; Expected date: 03/29/2023  -     Comprehensive Metabolic Panel; Future; Expected date: 03/29/2023    Pure hypercholesterolemia  -     Lipid Panel; Future; Expected date: 03/29/2023    Iron deficiency anemia due to chronic blood loss  -     CBC Auto Differential; Future; Expected date: 03/29/2023    Acquired hypothyroidism  -      TSH; Future; Expected date: 03/29/2023    Flu vaccine need  -     Influenza - Quadrivalent *Preferred* (6 months+) (PF)    Need for shingles vaccine  -     adjuvant AS01B, PF,vial 1 of 2 (SHINGRIX ADJUVANT COMPONENT-PF) Susp; Inject 0.5 mLs into the muscle once. for 1 dose  Dispense: 0.5 mL; Refill: 0    No follow-ups on file.        10/5/2022 Rachel Wagner M.D.

## 2022-10-04 ENCOUNTER — OFFICE VISIT (OUTPATIENT)
Dept: FAMILY MEDICINE | Facility: CLINIC | Age: 52
End: 2022-10-04
Payer: COMMERCIAL

## 2022-10-04 VITALS
HEIGHT: 64 IN | OXYGEN SATURATION: 97 % | RESPIRATION RATE: 16 BRPM | TEMPERATURE: 98 F | SYSTOLIC BLOOD PRESSURE: 128 MMHG | DIASTOLIC BLOOD PRESSURE: 80 MMHG | BODY MASS INDEX: 41.15 KG/M2 | WEIGHT: 241 LBS | HEART RATE: 66 BPM

## 2022-10-04 DIAGNOSIS — Z23 FLU VACCINE NEED: ICD-10-CM

## 2022-10-04 DIAGNOSIS — D50.0 IRON DEFICIENCY ANEMIA DUE TO CHRONIC BLOOD LOSS: ICD-10-CM

## 2022-10-04 DIAGNOSIS — E03.9 ACQUIRED HYPOTHYROIDISM: ICD-10-CM

## 2022-10-04 DIAGNOSIS — E78.00 PURE HYPERCHOLESTEROLEMIA: Chronic | ICD-10-CM

## 2022-10-04 DIAGNOSIS — I10 ESSENTIAL HYPERTENSION: Primary | Chronic | ICD-10-CM

## 2022-10-04 DIAGNOSIS — Z23 NEED FOR SHINGLES VACCINE: ICD-10-CM

## 2022-10-04 PROCEDURE — 4010F ACE/ARB THERAPY RXD/TAKEN: CPT | Mod: CPTII,S$GLB,, | Performed by: INTERNAL MEDICINE

## 2022-10-04 PROCEDURE — 3074F PR MOST RECENT SYSTOLIC BLOOD PRESSURE < 130 MM HG: ICD-10-PCS | Mod: CPTII,S$GLB,, | Performed by: INTERNAL MEDICINE

## 2022-10-04 PROCEDURE — 90471 IMMUNIZATION ADMIN: CPT | Mod: S$GLB,,, | Performed by: INTERNAL MEDICINE

## 2022-10-04 PROCEDURE — 1159F MED LIST DOCD IN RCRD: CPT | Mod: CPTII,S$GLB,, | Performed by: INTERNAL MEDICINE

## 2022-10-04 PROCEDURE — 1160F RVW MEDS BY RX/DR IN RCRD: CPT | Mod: CPTII,S$GLB,, | Performed by: INTERNAL MEDICINE

## 2022-10-04 PROCEDURE — 3079F PR MOST RECENT DIASTOLIC BLOOD PRESSURE 80-89 MM HG: ICD-10-PCS | Mod: CPTII,S$GLB,, | Performed by: INTERNAL MEDICINE

## 2022-10-04 PROCEDURE — 4010F PR ACE/ARB THEARPY RXD/TAKEN: ICD-10-PCS | Mod: CPTII,S$GLB,, | Performed by: INTERNAL MEDICINE

## 2022-10-04 PROCEDURE — 3008F BODY MASS INDEX DOCD: CPT | Mod: CPTII,S$GLB,, | Performed by: INTERNAL MEDICINE

## 2022-10-04 PROCEDURE — 3008F PR BODY MASS INDEX (BMI) DOCUMENTED: ICD-10-PCS | Mod: CPTII,S$GLB,, | Performed by: INTERNAL MEDICINE

## 2022-10-04 PROCEDURE — 3074F SYST BP LT 130 MM HG: CPT | Mod: CPTII,S$GLB,, | Performed by: INTERNAL MEDICINE

## 2022-10-04 PROCEDURE — 99214 PR OFFICE/OUTPT VISIT, EST, LEVL IV, 30-39 MIN: ICD-10-PCS | Mod: 25,S$GLB,, | Performed by: INTERNAL MEDICINE

## 2022-10-04 PROCEDURE — 90471 FLU VACCINE (QUAD) GREATER THAN OR EQUAL TO 3YO PRESERVATIVE FREE IM: ICD-10-PCS | Mod: S$GLB,,, | Performed by: INTERNAL MEDICINE

## 2022-10-04 PROCEDURE — 90686 IIV4 VACC NO PRSV 0.5 ML IM: CPT | Mod: S$GLB,,, | Performed by: INTERNAL MEDICINE

## 2022-10-04 PROCEDURE — 99214 OFFICE O/P EST MOD 30 MIN: CPT | Mod: 25,S$GLB,, | Performed by: INTERNAL MEDICINE

## 2022-10-04 PROCEDURE — 90686 FLU VACCINE (QUAD) GREATER THAN OR EQUAL TO 3YO PRESERVATIVE FREE IM: ICD-10-PCS | Mod: S$GLB,,, | Performed by: INTERNAL MEDICINE

## 2022-10-04 PROCEDURE — 1160F PR REVIEW ALL MEDS BY PRESCRIBER/CLIN PHARMACIST DOCUMENTED: ICD-10-PCS | Mod: CPTII,S$GLB,, | Performed by: INTERNAL MEDICINE

## 2022-10-04 PROCEDURE — 1159F PR MEDICATION LIST DOCUMENTED IN MEDICAL RECORD: ICD-10-PCS | Mod: CPTII,S$GLB,, | Performed by: INTERNAL MEDICINE

## 2022-10-04 PROCEDURE — 3079F DIAST BP 80-89 MM HG: CPT | Mod: CPTII,S$GLB,, | Performed by: INTERNAL MEDICINE

## 2022-10-05 RX ORDER — ADJUVANT AS01B/PF, VIAL 1 OF 2
1 VIAL (ML) INTRAMUSCULAR ONCE
Qty: 0.5 ML | Refills: 0 | Status: SHIPPED | OUTPATIENT
Start: 2022-10-05 | End: 2022-10-05

## 2022-11-28 ENCOUNTER — PATIENT MESSAGE (OUTPATIENT)
Dept: FAMILY MEDICINE | Facility: CLINIC | Age: 52
End: 2022-11-28

## 2022-11-29 RX ORDER — TIRZEPATIDE 2.5 MG/.5ML
2.5 INJECTION, SOLUTION SUBCUTANEOUS
Qty: 1 PEN | Refills: 1 | Status: SHIPPED | OUTPATIENT
Start: 2022-11-29 | End: 2023-04-04

## 2023-04-04 ENCOUNTER — OFFICE VISIT (OUTPATIENT)
Dept: FAMILY MEDICINE | Facility: CLINIC | Age: 53
End: 2023-04-04
Payer: COMMERCIAL

## 2023-04-04 ENCOUNTER — PATIENT MESSAGE (OUTPATIENT)
Dept: FAMILY MEDICINE | Facility: CLINIC | Age: 53
End: 2023-04-04

## 2023-04-04 VITALS
BODY MASS INDEX: 41.15 KG/M2 | SYSTOLIC BLOOD PRESSURE: 108 MMHG | WEIGHT: 241 LBS | TEMPERATURE: 98 F | RESPIRATION RATE: 14 BRPM | HEART RATE: 72 BPM | HEIGHT: 64 IN | DIASTOLIC BLOOD PRESSURE: 68 MMHG | OXYGEN SATURATION: 97 %

## 2023-04-04 DIAGNOSIS — D50.0 IRON DEFICIENCY ANEMIA DUE TO CHRONIC BLOOD LOSS: ICD-10-CM

## 2023-04-04 DIAGNOSIS — E03.9 ACQUIRED HYPOTHYROIDISM: ICD-10-CM

## 2023-04-04 DIAGNOSIS — Z12.31 ENCOUNTER FOR SCREENING MAMMOGRAM FOR BREAST CANCER: ICD-10-CM

## 2023-04-04 DIAGNOSIS — F41.9 ANXIETY: ICD-10-CM

## 2023-04-04 DIAGNOSIS — I10 ESSENTIAL HYPERTENSION: Primary | Chronic | ICD-10-CM

## 2023-04-04 DIAGNOSIS — Z13.1 SCREENING FOR DIABETES MELLITUS: ICD-10-CM

## 2023-04-04 DIAGNOSIS — E78.00 PURE HYPERCHOLESTEROLEMIA: Chronic | ICD-10-CM

## 2023-04-04 PROCEDURE — 1160F RVW MEDS BY RX/DR IN RCRD: CPT | Mod: CPTII,S$GLB,, | Performed by: INTERNAL MEDICINE

## 2023-04-04 PROCEDURE — 3078F PR MOST RECENT DIASTOLIC BLOOD PRESSURE < 80 MM HG: ICD-10-PCS | Mod: CPTII,S$GLB,, | Performed by: INTERNAL MEDICINE

## 2023-04-04 PROCEDURE — 3074F SYST BP LT 130 MM HG: CPT | Mod: CPTII,S$GLB,, | Performed by: INTERNAL MEDICINE

## 2023-04-04 PROCEDURE — 4010F ACE/ARB THERAPY RXD/TAKEN: CPT | Mod: CPTII,S$GLB,, | Performed by: INTERNAL MEDICINE

## 2023-04-04 PROCEDURE — 99214 OFFICE O/P EST MOD 30 MIN: CPT | Mod: S$GLB,,, | Performed by: INTERNAL MEDICINE

## 2023-04-04 PROCEDURE — 3074F PR MOST RECENT SYSTOLIC BLOOD PRESSURE < 130 MM HG: ICD-10-PCS | Mod: CPTII,S$GLB,, | Performed by: INTERNAL MEDICINE

## 2023-04-04 PROCEDURE — 1159F PR MEDICATION LIST DOCUMENTED IN MEDICAL RECORD: ICD-10-PCS | Mod: CPTII,S$GLB,, | Performed by: INTERNAL MEDICINE

## 2023-04-04 PROCEDURE — 3008F PR BODY MASS INDEX (BMI) DOCUMENTED: ICD-10-PCS | Mod: CPTII,S$GLB,, | Performed by: INTERNAL MEDICINE

## 2023-04-04 PROCEDURE — 3078F DIAST BP <80 MM HG: CPT | Mod: CPTII,S$GLB,, | Performed by: INTERNAL MEDICINE

## 2023-04-04 PROCEDURE — 1160F PR REVIEW ALL MEDS BY PRESCRIBER/CLIN PHARMACIST DOCUMENTED: ICD-10-PCS | Mod: CPTII,S$GLB,, | Performed by: INTERNAL MEDICINE

## 2023-04-04 PROCEDURE — 1159F MED LIST DOCD IN RCRD: CPT | Mod: CPTII,S$GLB,, | Performed by: INTERNAL MEDICINE

## 2023-04-04 PROCEDURE — 3008F BODY MASS INDEX DOCD: CPT | Mod: CPTII,S$GLB,, | Performed by: INTERNAL MEDICINE

## 2023-04-04 PROCEDURE — 99214 PR OFFICE/OUTPT VISIT, EST, LEVL IV, 30-39 MIN: ICD-10-PCS | Mod: S$GLB,,, | Performed by: INTERNAL MEDICINE

## 2023-04-04 PROCEDURE — 4010F PR ACE/ARB THEARPY RXD/TAKEN: ICD-10-PCS | Mod: CPTII,S$GLB,, | Performed by: INTERNAL MEDICINE

## 2023-04-04 RX ORDER — LORAZEPAM 0.5 MG/1
0.5 TABLET ORAL DAILY PRN
Qty: 90 TABLET | Refills: 0 | Status: SHIPPED | OUTPATIENT
Start: 2023-04-04 | End: 2023-10-04 | Stop reason: SDUPTHER

## 2023-04-04 RX ORDER — SEMAGLUTIDE 2.68 MG/ML
2.5 INJECTION, SOLUTION SUBCUTANEOUS
COMMUNITY
End: 2023-10-04

## 2023-04-04 RX ORDER — PEN NEEDLE, DIABETIC 32GX 5/32"
1 NEEDLE, DISPOSABLE MISCELLANEOUS WEEKLY
COMMUNITY
Start: 2022-12-08 | End: 2023-10-04

## 2023-04-04 NOTE — PROGRESS NOTES
SUBJECTIVE:    Patient ID: Jalyn Dickson is a 53 y.o. female.    Chief Complaint: Hypertension, Medication Refill, Weight Check, and Follow-up    HPI  Jalyn Dickson is here for follow-up of her hypertension Home blood pressure readings:120/70 . Salt intake and diet: salt not added to cooking. Usual weight: 241. Associated signs and symptoms: none. Patient denies: blurred vision, chest pain, dyspnea, headache, neck aches, orthopnea, palpitations, paroxysmal nocturnal dyspnea, peripheral edema, pulsating in the ears, and tiredness/fatigue. Use of agents associated with hypertension: none. Medication compliance: taking as prescribed    10 pound weight loss with ozempic    No visits with results within 1 Year(s) from this visit.   Latest known visit with results is:   Telephone on 02/17/2022   Component Date Value Ref Range Status    WBC 03/29/2022 7.0  3.8 - 10.8 Thousand/uL Final    RBC 03/29/2022 4.58  3.80 - 5.10 Million/uL Final    Hemoglobin 03/29/2022 12.0  11.7 - 15.5 g/dL Final    Hematocrit 03/29/2022 38.0  35.0 - 45.0 % Final    MCV 03/29/2022 83.0  80.0 - 100.0 fL Final    MCH 03/29/2022 26.2 (L)  27.0 - 33.0 pg Final    MCHC 03/29/2022 31.6 (L)  32.0 - 36.0 g/dL Final    RDW 03/29/2022 14.8  11.0 - 15.0 % Final    Platelets 03/29/2022 479 (H)  140 - 400 Thousand/uL Final    MPV 03/29/2022 10.6  7.5 - 12.5 fL Final    Neutrophils, Abs 03/29/2022 4,221  1,500 - 7,800 cells/uL Final    Lymph # 03/29/2022 2,037  850 - 3,900 cells/uL Final    Mono # 03/29/2022 588  200 - 950 cells/uL Final    Eos # 03/29/2022 133  15 - 500 cells/uL Final    Baso # 03/29/2022 21  0 - 200 cells/uL Final    Neutrophils Relative 03/29/2022 60.3  % Final    Lymph % 03/29/2022 29.1  % Final    Mono % 03/29/2022 8.4  % Final    Eosinophil % 03/29/2022 1.9  % Final    Basophil % 03/29/2022 0.3  % Final    Glucose 03/29/2022 87  65 - 99 mg/dL Final    BUN 03/29/2022 7  7 - 25 mg/dL Final    Creatinine  03/29/2022 0.58  0.50 - 1.05 mg/dL Final    eGFR if non African American 03/29/2022 106  > OR = 60 mL/min/1.73m2 Final    eGFR if  03/29/2022 123  > OR = 60 mL/min/1.73m2 Final    BUN/Creatinine Ratio 03/29/2022 NOT APPLICABLE  6 - 22 (calc) Final    Sodium 03/29/2022 137  135 - 146 mmol/L Final    Potassium 03/29/2022 4.0  3.5 - 5.3 mmol/L Final    Chloride 03/29/2022 101  98 - 110 mmol/L Final    CO2 03/29/2022 28  20 - 32 mmol/L Final    Calcium 03/29/2022 9.6  8.6 - 10.4 mg/dL Final    Total Protein 03/29/2022 7.5  6.1 - 8.1 g/dL Final    Albumin 03/29/2022 4.0  3.6 - 5.1 g/dL Final    Globulin, Total 03/29/2022 3.5  1.9 - 3.7 g/dL (calc) Final    Albumin/Globulin Ratio 03/29/2022 1.1  1.0 - 2.5 (calc) Final    Total Bilirubin 03/29/2022 0.6  0.2 - 1.2 mg/dL Final    Alkaline Phosphatase 03/29/2022 73  37 - 153 U/L Final    AST 03/29/2022 14  10 - 35 U/L Final    ALT 03/29/2022 10  6 - 29 U/L Final    TSH 03/29/2022 2.03  mIU/L Final       Past Medical History:   Diagnosis Date    Acid reflux     Allergy     Anxiety     Hypertension     Neck mass     Pulmonary embolism     Sinus drainage      Past Surgical History:   Procedure Laterality Date    NECK LESION BIOPSY      neck mass removal      TUBAL LIGATION  1999     Family History   Problem Relation Age of Onset    Heart disease Mother     Coronary artery disease Father     Peripheral vascular disease Father     Diabetes Father     Hypertension Father     Hypertension Maternal Grandmother     No Known Problems Maternal Grandfather     PDD Paternal Grandfather     Hypertension Paternal Grandfather     No Known Problems Son        Marital Status:   Alcohol History:  reports current alcohol use of about 2.0 standard drinks per week.  Tobacco History:  reports that she has never smoked. She has never used smokeless tobacco.  Drug History:  reports current drug use.    Review of patient's allergies  "indicates:  No Known Allergies    Current Outpatient Medications:     albuterol (VENTOLIN HFA) 90 mcg/actuation inhaler, Inhale 2 puffs into the lungs every 6 (six) hours as needed for Wheezing. Rescue, Disp: 18 g, Rfl: 0    ascorbic acid, vitamin C, (VITAMIN C) 1000 MG tablet, Take 1,000 mg by mouth once daily., Disp: , Rfl:     aspirin 81 MG Chew, Take 81 mg by mouth once daily., Disp: , Rfl:     atorvastatin (LIPITOR) 40 MG tablet, TAKE 1 TABLET EVERY DAY, Disp: 90 tablet, Rfl: 0    b complex vitamins tablet, Take 1 tablet by mouth once daily., Disp: , Rfl:     cyanocobalamin (VITAMIN B-12) 1000 MCG tablet, Take 100 mcg by mouth once daily., Disp: , Rfl:     diltiaZEM (CARDIZEM CD) 240 MG 24 hr capsule, Take 1 capsule (240 mg total) by mouth once daily., Disp: 90 capsule, Rfl: 0    DROPLET PEN NEEDLE 32 gauge x 5/32" Ndle, Inject 1 each into the skin once a week., Disp: , Rfl:     fluticasone propionate (FLONASE) 50 mcg/actuation nasal spray, 1 spray (50 mcg total) by Each Nostril route once daily., Disp: 15.8 mL, Rfl: 0    GENERLAC 10 gram/15 mL solution, Take 10 g by mouth 2 (two) times daily. , Disp: , Rfl:     hydroCHLOROthiazide (HYDRODIURIL) 12.5 MG Tab, Take 1 tablet (12.5 mg total) by mouth 2 (two) times a day., Disp: 180 tablet, Rfl: 3    ipratropium (ATROVENT HFA) 17 mcg/actuation inhaler, Inhale 2 puffs into the lungs every 6 (six) hours. Rescue, Disp: 12.9 g, Rfl: 0    levocetirizine (XYZAL) 5 MG tablet, Take 5 mg by mouth every evening., Disp: , Rfl:     LORazepam (ATIVAN) 0.5 MG tablet, Take 1 tablet (0.5 mg total) by mouth daily as needed for Anxiety., Disp: 90 tablet, Rfl: 0    omeprazole (PRILOSEC) 20 MG capsule, Take 20 mg by mouth once daily., Disp: , Rfl:     semaglutide (OZEMPIC) 2 mg/dose (8 mg/3 mL) PnIj, Inject 2.5 mg into the skin every 7 days., Disp: , Rfl:     valsartan (DIOVAN) 160 MG tablet, TAKE 1 TABLET TWICE DAILY, Disp: 180 tablet, Rfl: 0    vitamin E 400 UNIT " capsule, Take 400 Units by mouth once daily., Disp: , Rfl:     zinc sulfate (ZINCATE) 50 mg zinc (220 mg) capsule, Take 1 capsule by mouth once daily., Disp: , Rfl:     Review of Systems   Constitutional:  Negative for activity change, appetite change, chills, diaphoresis, fatigue, fever and unexpected weight change.   HENT:  Negative for congestion, ear pain, hearing loss, nosebleeds, postnasal drip, rhinorrhea, sinus pressure, sinus pain, sneezing, sore throat, tinnitus, trouble swallowing and voice change.    Eyes:  Negative for photophobia, pain, discharge, itching and visual disturbance.   Respiratory:  Negative for apnea, cough, chest tightness, shortness of breath, wheezing and stridor.         No pulmonary episodes   Cardiovascular:  Negative for chest pain, palpitations and leg swelling.   Gastrointestinal:  Positive for constipation. Negative for abdominal distention, abdominal pain, blood in stool, diarrhea, nausea and vomiting.   Endocrine: Negative for cold intolerance, heat intolerance, polydipsia and polyuria.   Genitourinary:  Negative for difficulty urinating, dyspareunia, dysuria, flank pain, frequency, hematuria, menstrual problem, pelvic pain, urgency, vaginal discharge and vaginal pain.   Musculoskeletal:  Negative for arthralgias, back pain, joint swelling, myalgias, neck pain and neck stiffness.   Skin:  Negative for pallor.   Allergic/Immunologic: Negative for environmental allergies and food allergies.   Neurological:  Negative for dizziness, tremors, speech difficulty, weakness, light-headedness, numbness and headaches.   Hematological:  Does not bruise/bleed easily.   Psychiatric/Behavioral:  Negative for agitation, confusion, decreased concentration, dysphoric mood, sleep disturbance and suicidal ideas. The patient is not nervous/anxious.         Objective:      Vitals:    04/04/23 1347   BP: 108/68   Pulse: 72   Resp: 14   Temp: 98.3 °F (36.8 °C)       Physical Exam  Constitutional:        General: She is not in acute distress.     Appearance: She is obese. She is not ill-appearing.   HENT:      Head: Normocephalic and atraumatic.   Eyes:      Extraocular Movements: Extraocular movements intact.      Pupils: Pupils are equal, round, and reactive to light.   Neck:      Vascular: No carotid bruit.   Cardiovascular:      Rate and Rhythm: Normal rate and regular rhythm.      Pulses: Normal pulses.      Heart sounds: Normal heart sounds. No murmur heard.  Pulmonary:      Effort: Pulmonary effort is normal.      Breath sounds: Normal breath sounds.   Abdominal:      General: Bowel sounds are normal.      Palpations: Abdomen is soft.   Musculoskeletal:      Right lower leg: No edema.      Left lower leg: No edema.   Lymphadenopathy:      Cervical: No cervical adenopathy.   Skin:     General: Skin is warm and dry.      Capillary Refill: Capillary refill takes less than 2 seconds.   Neurological:      General: No focal deficit present.      Mental Status: She is alert and oriented to person, place, and time.      Gait: Gait normal.      Deep Tendon Reflexes: Reflexes normal.   Psychiatric:         Mood and Affect: Mood normal.         Thought Content: Thought content normal.         Judgment: Judgment normal.         Assessment:       1. Encounter for screening mammogram for breast cancer    2. Essential hypertension    3. Pure hypercholesterolemia    4. Iron deficiency anemia due to chronic blood loss    5. Acquired hypothyroidism    6. Anxiety         Plan:       Encounter for screening mammogram for breast cancer    Essential hypertension    Pure hypercholesterolemia    Iron deficiency anemia due to chronic blood loss    Acquired hypothyroidism    Anxiety      No follow-ups on file.        4/4/2023 Rachel Wagner M.D.

## 2023-04-06 LAB
PAP RECOMMENDATION EXT: NORMAL
PAP SMEAR: NORMAL

## 2023-04-18 ENCOUNTER — TELEPHONE (OUTPATIENT)
Dept: FAMILY MEDICINE | Facility: CLINIC | Age: 53
End: 2023-04-18

## 2023-04-18 NOTE — TELEPHONE ENCOUNTER
----- Message from Rachel Wagner MD sent at 4/15/2023  5:09 PM CDT -----  Regarding: mammo  Neg mammo  ----- Message -----  From: Melida Williamson  Sent: 4/12/2023  10:35 AM CDT  To: Rachel Wagner MD    RADIOLOGY

## 2023-05-12 DIAGNOSIS — I10 UNCONTROLLED HYPERTENSION: ICD-10-CM

## 2023-05-15 RX ORDER — DILTIAZEM HYDROCHLORIDE 240 MG/1
CAPSULE, COATED, EXTENDED RELEASE ORAL
Qty: 90 CAPSULE | Refills: 0 | Status: SHIPPED | OUTPATIENT
Start: 2023-05-15 | End: 2023-09-21 | Stop reason: SDUPTHER

## 2023-05-31 DIAGNOSIS — I10 ESSENTIAL HYPERTENSION: ICD-10-CM

## 2023-06-01 RX ORDER — HYDROCHLOROTHIAZIDE 12.5 MG/1
12.5 TABLET ORAL 2 TIMES DAILY
Qty: 180 TABLET | Refills: 0 | Status: SHIPPED | OUTPATIENT
Start: 2023-06-01 | End: 2023-10-09 | Stop reason: SDUPTHER

## 2023-07-27 DIAGNOSIS — E78.00 PURE HYPERCHOLESTEROLEMIA: ICD-10-CM

## 2023-07-27 RX ORDER — ATORVASTATIN CALCIUM 40 MG/1
TABLET, FILM COATED ORAL
Qty: 90 TABLET | Refills: 0 | Status: SHIPPED | OUTPATIENT
Start: 2023-07-27 | End: 2023-10-03

## 2023-09-05 ENCOUNTER — PATIENT MESSAGE (OUTPATIENT)
Dept: FAMILY MEDICINE | Facility: CLINIC | Age: 53
End: 2023-09-05

## 2023-09-21 DIAGNOSIS — I10 UNCONTROLLED HYPERTENSION: ICD-10-CM

## 2023-09-22 RX ORDER — DILTIAZEM HYDROCHLORIDE 240 MG/1
240 CAPSULE, COATED, EXTENDED RELEASE ORAL DAILY
Qty: 90 CAPSULE | Refills: 0 | Status: SHIPPED | OUTPATIENT
Start: 2023-09-22 | End: 2023-09-27 | Stop reason: SDUPTHER

## 2023-09-22 RX ORDER — VALSARTAN 160 MG/1
160 TABLET ORAL 2 TIMES DAILY
Qty: 180 TABLET | Refills: 0 | Status: SHIPPED | OUTPATIENT
Start: 2023-09-22 | End: 2023-09-27 | Stop reason: SDUPTHER

## 2023-09-23 LAB
ALBUMIN SERPL-MCNC: 4 G/DL (ref 3.6–5.1)
ALBUMIN/GLOB SERPL: 1.4 (CALC) (ref 1–2.5)
ALP SERPL-CCNC: 72 U/L (ref 37–153)
ALT SERPL-CCNC: 14 U/L (ref 6–29)
AST SERPL-CCNC: 15 U/L (ref 10–35)
BASOPHILS # BLD AUTO: 37 CELLS/UL (ref 0–200)
BASOPHILS NFR BLD AUTO: 0.5 %
BILIRUB SERPL-MCNC: 0.7 MG/DL (ref 0.2–1.2)
BUN SERPL-MCNC: 9 MG/DL (ref 7–25)
BUN/CREAT SERPL: NORMAL (CALC) (ref 6–22)
CALCIUM SERPL-MCNC: 9.5 MG/DL (ref 8.6–10.4)
CHLORIDE SERPL-SCNC: 102 MMOL/L (ref 98–110)
CHOLEST SERPL-MCNC: 144 MG/DL
CHOLEST/HDLC SERPL: 2.2 (CALC)
CO2 SERPL-SCNC: 28 MMOL/L (ref 20–32)
CREAT SERPL-MCNC: 0.74 MG/DL (ref 0.5–1.03)
EGFR: 97 ML/MIN/1.73M2
EOSINOPHIL # BLD AUTO: 292 CELLS/UL (ref 15–500)
EOSINOPHIL NFR BLD AUTO: 4 %
ERYTHROCYTE [DISTWIDTH] IN BLOOD BY AUTOMATED COUNT: 14 % (ref 11–15)
GLOBULIN SER CALC-MCNC: 2.8 G/DL (CALC) (ref 1.9–3.7)
GLUCOSE SERPL-MCNC: 91 MG/DL (ref 65–99)
HBA1C MFR BLD: 5.4 % OF TOTAL HGB
HCT VFR BLD AUTO: 40.1 % (ref 35–45)
HDLC SERPL-MCNC: 65 MG/DL
HGB BLD-MCNC: 13 G/DL (ref 11.7–15.5)
LDLC SERPL CALC-MCNC: 64 MG/DL (CALC)
LYMPHOCYTES # BLD AUTO: 2073 CELLS/UL (ref 850–3900)
LYMPHOCYTES NFR BLD AUTO: 28.4 %
MCH RBC QN AUTO: 28.5 PG (ref 27–33)
MCHC RBC AUTO-ENTMCNC: 32.4 G/DL (ref 32–36)
MCV RBC AUTO: 87.9 FL (ref 80–100)
MONOCYTES # BLD AUTO: 606 CELLS/UL (ref 200–950)
MONOCYTES NFR BLD AUTO: 8.3 %
NEUTROPHILS # BLD AUTO: 4292 CELLS/UL (ref 1500–7800)
NEUTROPHILS NFR BLD AUTO: 58.8 %
NONHDLC SERPL-MCNC: 79 MG/DL (CALC)
PLATELET # BLD AUTO: 460 THOUSAND/UL (ref 140–400)
PMV BLD REES-ECKER: 10.8 FL (ref 7.5–12.5)
POTASSIUM SERPL-SCNC: 4.1 MMOL/L (ref 3.5–5.3)
PROT SERPL-MCNC: 6.8 G/DL (ref 6.1–8.1)
RBC # BLD AUTO: 4.56 MILLION/UL (ref 3.8–5.1)
SODIUM SERPL-SCNC: 140 MMOL/L (ref 135–146)
TRIGL SERPL-MCNC: 69 MG/DL
TSH SERPL-ACNC: 2.78 MIU/L
WBC # BLD AUTO: 7.3 THOUSAND/UL (ref 3.8–10.8)

## 2023-09-27 DIAGNOSIS — I10 UNCONTROLLED HYPERTENSION: ICD-10-CM

## 2023-09-27 RX ORDER — DILTIAZEM HYDROCHLORIDE 240 MG/1
240 CAPSULE, COATED, EXTENDED RELEASE ORAL DAILY
Qty: 90 CAPSULE | Refills: 0 | Status: SHIPPED | OUTPATIENT
Start: 2023-09-27 | End: 2023-10-13

## 2023-09-27 RX ORDER — VALSARTAN 160 MG/1
160 TABLET ORAL 2 TIMES DAILY
Qty: 180 TABLET | Refills: 0 | Status: SHIPPED | OUTPATIENT
Start: 2023-09-27 | End: 2023-10-13

## 2023-10-01 NOTE — PROGRESS NOTES
SUBJECTIVE:    Patient ID: Jalyn Dickson is a 53 y.o. female.    Chief Complaint: Medication Refill, Results, Hypertension, and Follow-up    HPI    6 mo recheck-     HTN_she checks her BP at home is always lower than this today-usually 110-120/70--The cholesterol is no longer elevated    WBC 7.3 h/h 13/40.1 pLATELETS 460,000 BUN 9 Cr .74 Na 140 K 4.1 glu 91 ALT 14 chol 144   trigs 69   hdl  65  ldl 64 A1C 5.4    She has had no more issue with the bronchitis-She is no longer iron deficeint or anemic-Her reflux is good on omeprazole    In 2021 she was 242 and now she weighs 218 pounds    Office Visit on 04/04/2023   Component Date Value Ref Range Status    WBC 09/22/2023 7.3  3.8 - 10.8 Thousand/uL Final    RBC 09/22/2023 4.56  3.80 - 5.10 Million/uL Final    Hemoglobin 09/22/2023 13.0  11.7 - 15.5 g/dL Final    Hematocrit 09/22/2023 40.1  35.0 - 45.0 % Final    MCV 09/22/2023 87.9  80.0 - 100.0 fL Final    MCH 09/22/2023 28.5  27.0 - 33.0 pg Final    MCHC 09/22/2023 32.4  32.0 - 36.0 g/dL Final    RDW 09/22/2023 14.0  11.0 - 15.0 % Final    Platelets 09/22/2023 460 (H)  140 - 400 Thousand/uL Final    MPV 09/22/2023 10.8  7.5 - 12.5 fL Final    Neutrophils, Abs 09/22/2023 4,292  1,500 - 7,800 cells/uL Final    Lymph # 09/22/2023 2,073  850 - 3,900 cells/uL Final    Mono # 09/22/2023 606  200 - 950 cells/uL Final    Eos # 09/22/2023 292  15 - 500 cells/uL Final    Baso # 09/22/2023 37  0 - 200 cells/uL Final    Neutrophils Relative 09/22/2023 58.8  % Final    Lymph % 09/22/2023 28.4  % Final    Mono % 09/22/2023 8.3  % Final    Eosinophil % 09/22/2023 4.0  % Final    Basophil % 09/22/2023 0.5  % Final    Glucose 09/22/2023 91  65 - 99 mg/dL Final    BUN 09/22/2023 9  7 - 25 mg/dL Final    Creatinine 09/22/2023 0.74  0.50 - 1.03 mg/dL Final    eGFR 09/22/2023 97  > OR = 60 mL/min/1.73m2 Final    BUN/Creatinine Ratio 09/22/2023 SEE NOTE:  6 - 22 (calc) Final    Sodium 09/22/2023 140  135 - 146 mmol/L Final     Potassium 09/22/2023 4.1  3.5 - 5.3 mmol/L Final    Chloride 09/22/2023 102  98 - 110 mmol/L Final    CO2 09/22/2023 28  20 - 32 mmol/L Final    Calcium 09/22/2023 9.5  8.6 - 10.4 mg/dL Final    Total Protein 09/22/2023 6.8  6.1 - 8.1 g/dL Final    Albumin 09/22/2023 4.0  3.6 - 5.1 g/dL Final    Globulin, Total 09/22/2023 2.8  1.9 - 3.7 g/dL (calc) Final    Albumin/Globulin Ratio 09/22/2023 1.4  1.0 - 2.5 (calc) Final    Total Bilirubin 09/22/2023 0.7  0.2 - 1.2 mg/dL Final    Alkaline Phosphatase 09/22/2023 72  37 - 153 U/L Final    AST 09/22/2023 15  10 - 35 U/L Final    ALT 09/22/2023 14  6 - 29 U/L Final    Cholesterol 09/22/2023 144  <200 mg/dL Final    HDL 09/22/2023 65  > OR = 50 mg/dL Final    Triglycerides 09/22/2023 69  <150 mg/dL Final    LDL Cholesterol 09/22/2023 64  mg/dL (calc) Final    HDL/Cholesterol Ratio 09/22/2023 2.2  <5.0 (calc) Final    Non HDL Chol. (LDL+VLDL) 09/22/2023 79  <130 mg/dL (calc) Final    TSH 09/22/2023 2.78  mIU/L Final    Hemoglobin A1C 09/22/2023 5.4  <5.7 % of total Hgb Final       Past Medical History:   Diagnosis Date    Acid reflux     Allergy     Anxiety     Hypertension     Neck mass     Pulmonary embolism     Sinus drainage      Past Surgical History:   Procedure Laterality Date    NECK LESION BIOPSY      neck mass removal      TUBAL LIGATION  1999     Family History   Problem Relation Age of Onset    Heart disease Mother     Coronary artery disease Father     Peripheral vascular disease Father     Diabetes Father     Hypertension Father     Hypertension Maternal Grandmother     No Known Problems Maternal Grandfather     PDD Paternal Grandfather     Hypertension Paternal Grandfather     No Known Problems Son        Marital Status:   Alcohol History:  reports current alcohol use of about 2.0 standard drinks of alcohol per week.  Tobacco History:  reports that she has never smoked. She has never used smokeless tobacco.  Drug History:  reports current drug  use.    Review of patient's allergies indicates:  No Known Allergies    Current Outpatient Medications:     ascorbic acid, vitamin C, (VITAMIN C) 1000 MG tablet, Take 1,000 mg by mouth once daily., Disp: , Rfl:     aspirin 81 MG Chew, Take 81 mg by mouth once daily., Disp: , Rfl:     atorvastatin (LIPITOR) 40 MG tablet, TAKE 1 TABLET BY MOUTH DAILY, Disp: 90 tablet, Rfl: 3    b complex vitamins tablet, Take 1 tablet by mouth once daily., Disp: , Rfl:     cyanocobalamin (VITAMIN B-12) 1000 MCG tablet, Take 100 mcg by mouth once daily., Disp: , Rfl:     diltiaZEM (CARDIZEM CD) 240 MG 24 hr capsule, Take 1 capsule (240 mg total) by mouth once daily., Disp: 90 capsule, Rfl: 0    fluticasone propionate (FLONASE) 50 mcg/actuation nasal spray, 1 spray (50 mcg total) by Each Nostril route once daily., Disp: 15.8 mL, Rfl: 0    GENERLAC 10 gram/15 mL solution, Take 10 g by mouth 2 (two) times daily. , Disp: , Rfl:     hydroCHLOROthiazide (HYDRODIURIL) 12.5 MG Tab, Take 1 tablet (12.5 mg total) by mouth 2 (two) times a day., Disp: 180 tablet, Rfl: 0    levocetirizine (XYZAL) 5 MG tablet, Take 5 mg by mouth every evening., Disp: , Rfl:     LORazepam (ATIVAN) 0.5 MG tablet, Take 1 tablet (0.5 mg total) by mouth daily as needed for Anxiety., Disp: 90 tablet, Rfl: 0    omeprazole (PRILOSEC) 20 MG capsule, Take 20 mg by mouth once daily., Disp: , Rfl:     valsartan (DIOVAN) 160 MG tablet, Take 1 tablet (160 mg total) by mouth 2 (two) times daily., Disp: 180 tablet, Rfl: 0    vitamin E 400 UNIT capsule, Take 400 Units by mouth once daily., Disp: , Rfl:     zinc sulfate (ZINCATE) 50 mg zinc (220 mg) capsule, Take 1 capsule by mouth once daily., Disp: , Rfl:     Review of Systems   Constitutional:  Negative for appetite change, chills, diaphoresis, fatigue, fever and unexpected weight change.   HENT:  Negative for congestion, ear pain, hearing loss, nosebleeds, postnasal drip, sinus pressure, sinus pain, sneezing, sore throat,  "tinnitus, trouble swallowing and voice change.    Eyes:  Negative for photophobia, pain, itching and visual disturbance.   Respiratory:  Negative for apnea, cough, chest tightness, shortness of breath, wheezing and stridor.    Cardiovascular:  Negative for chest pain, palpitations and leg swelling.   Gastrointestinal:  Negative for abdominal distention, abdominal pain, blood in stool, constipation, diarrhea, nausea and vomiting.        HPI   Endocrine: Negative for cold intolerance, heat intolerance, polydipsia and polyuria.   Genitourinary:  Negative for difficulty urinating, dyspareunia, dysuria, flank pain, frequency, hematuria, menstrual problem, pelvic pain, urgency, vaginal discharge and vaginal pain.   Musculoskeletal:  Positive for arthralgias (knees). Negative for back pain, joint swelling, myalgias, neck pain and neck stiffness.   Skin:  Negative for pallor.   Allergic/Immunologic: Negative for environmental allergies and food allergies.   Neurological:  Negative for dizziness, tremors, speech difficulty, weakness, light-headedness and numbness.   Hematological:  Does not bruise/bleed easily.   Psychiatric/Behavioral:  Negative for agitation, confusion, decreased concentration, sleep disturbance and suicidal ideas. The patient is nervous/anxious (controlled with med).           Objective:          8/16/2021     5:06 PM 9/27/2021     8:08 AM 4/4/2022     5:27 PM 10/4/2022     5:20 PM 4/4/2023     1:47 PM 10/4/2023     4:43 PM   Vitals - 1 value per visit   SYSTOLIC 134 130 132 128 108 130   DIASTOLIC 82 85 86 80 68 80   Pulse 66 83 66 66 72 70   Temp 98.5 °F (36.9 °C) 97.6 °F (36.4 °C)  98.3 °F (36.8 °C) 98.3 °F (36.8 °C)    Resp 14 20 16 16 14 16   SPO2 98 % 97 % 97 % 97 % 97 % 99 %   Weight (lb) 237 242 241 241 241 218   Weight (kg) 107.502 109.77 109.317 109.317 109.317 98.884   Height 5' 4" (1.626 m) 5' 4" (1.626 m) 5' 4" (1.626 m) 5' 4" (1.626 m) 5' 4" (1.626 m) 5' 4" (1.626 m)   BMI (Calculated) 40.7 " 41.5 41.3 41.3 41.3 37.4   Pain Score Zero  Four Zero Zero Zero   (    Physical Exam  Vitals and nursing note reviewed.   Constitutional:       General: She is not in acute distress.     Appearance: She is obese. She is not ill-appearing.   HENT:      Head: Normocephalic and atraumatic.   Eyes:      Extraocular Movements: Extraocular movements intact.      Pupils: Pupils are equal, round, and reactive to light.   Neck:      Vascular: No carotid bruit.   Cardiovascular:      Rate and Rhythm: Normal rate and regular rhythm.      Pulses: Normal pulses.      Heart sounds: Normal heart sounds. No murmur heard.  Pulmonary:      Effort: Pulmonary effort is normal.      Breath sounds: Normal breath sounds. No wheezing or rhonchi.   Abdominal:      General: Bowel sounds are normal. There is no distension.      Palpations: Abdomen is soft.      Tenderness: There is no abdominal tenderness.   Musculoskeletal:      Cervical back: Normal range of motion and neck supple.      Right lower leg: No edema.      Left lower leg: No edema.   Lymphadenopathy:      Cervical: No cervical adenopathy.   Skin:     General: Skin is warm and dry.      Findings: No lesion or rash.   Neurological:      General: No focal deficit present.      Mental Status: She is alert and oriented to person, place, and time.      Cranial Nerves: No cranial nerve deficit.      Sensory: No sensory deficit.      Motor: No weakness.      Coordination: Coordination normal.   Psychiatric:         Mood and Affect: Mood normal.         Behavior: Behavior normal.         Thought Content: Thought content normal.           Assessment:       1. Essential hypertension    2. Pure hypercholesterolemia    3. Anxiety    4. Slow transit constipation         Plan:       Essential hypertension    Pure hypercholesterolemia    Anxiety    Slow transit constipation      No follow-ups on file.        10/4/2023 Rachel Wagner M.D.

## 2023-10-02 DIAGNOSIS — E78.00 PURE HYPERCHOLESTEROLEMIA: ICD-10-CM

## 2023-10-03 RX ORDER — ATORVASTATIN CALCIUM 40 MG/1
40 TABLET, FILM COATED ORAL
Qty: 90 TABLET | Refills: 3 | Status: SHIPPED | OUTPATIENT
Start: 2023-10-03

## 2023-10-04 ENCOUNTER — OFFICE VISIT (OUTPATIENT)
Dept: FAMILY MEDICINE | Facility: CLINIC | Age: 53
End: 2023-10-04
Payer: COMMERCIAL

## 2023-10-04 VITALS
DIASTOLIC BLOOD PRESSURE: 80 MMHG | HEIGHT: 64 IN | BODY MASS INDEX: 37.22 KG/M2 | HEART RATE: 70 BPM | RESPIRATION RATE: 16 BRPM | WEIGHT: 218 LBS | SYSTOLIC BLOOD PRESSURE: 130 MMHG | OXYGEN SATURATION: 99 %

## 2023-10-04 DIAGNOSIS — I10 ESSENTIAL HYPERTENSION: Primary | Chronic | ICD-10-CM

## 2023-10-04 DIAGNOSIS — K59.01 SLOW TRANSIT CONSTIPATION: ICD-10-CM

## 2023-10-04 DIAGNOSIS — F41.9 ANXIETY: ICD-10-CM

## 2023-10-04 DIAGNOSIS — E78.00 PURE HYPERCHOLESTEROLEMIA: Chronic | ICD-10-CM

## 2023-10-04 PROCEDURE — 3008F PR BODY MASS INDEX (BMI) DOCUMENTED: ICD-10-PCS | Mod: CPTII,S$GLB,, | Performed by: INTERNAL MEDICINE

## 2023-10-04 PROCEDURE — 3008F BODY MASS INDEX DOCD: CPT | Mod: CPTII,S$GLB,, | Performed by: INTERNAL MEDICINE

## 2023-10-04 PROCEDURE — 3044F PR MOST RECENT HEMOGLOBIN A1C LEVEL <7.0%: ICD-10-PCS | Mod: CPTII,S$GLB,, | Performed by: INTERNAL MEDICINE

## 2023-10-04 PROCEDURE — 90471 IMMUNIZATION ADMIN: CPT | Mod: S$GLB,,, | Performed by: INTERNAL MEDICINE

## 2023-10-04 PROCEDURE — 90686 IIV4 VACC NO PRSV 0.5 ML IM: CPT | Mod: S$GLB,,, | Performed by: INTERNAL MEDICINE

## 2023-10-04 PROCEDURE — 3079F DIAST BP 80-89 MM HG: CPT | Mod: CPTII,S$GLB,, | Performed by: INTERNAL MEDICINE

## 2023-10-04 PROCEDURE — 1159F PR MEDICATION LIST DOCUMENTED IN MEDICAL RECORD: ICD-10-PCS | Mod: CPTII,S$GLB,, | Performed by: INTERNAL MEDICINE

## 2023-10-04 PROCEDURE — 90471 FLU VACCINE (QUAD) GREATER THAN OR EQUAL TO 3YO PRESERVATIVE FREE IM: ICD-10-PCS | Mod: S$GLB,,, | Performed by: INTERNAL MEDICINE

## 2023-10-04 PROCEDURE — 99214 OFFICE O/P EST MOD 30 MIN: CPT | Mod: 25,S$GLB,, | Performed by: INTERNAL MEDICINE

## 2023-10-04 PROCEDURE — 3044F HG A1C LEVEL LT 7.0%: CPT | Mod: CPTII,S$GLB,, | Performed by: INTERNAL MEDICINE

## 2023-10-04 PROCEDURE — 90686 FLU VACCINE (QUAD) GREATER THAN OR EQUAL TO 3YO PRESERVATIVE FREE IM: ICD-10-PCS | Mod: S$GLB,,, | Performed by: INTERNAL MEDICINE

## 2023-10-04 PROCEDURE — 99214 PR OFFICE/OUTPT VISIT, EST, LEVL IV, 30-39 MIN: ICD-10-PCS | Mod: 25,S$GLB,, | Performed by: INTERNAL MEDICINE

## 2023-10-04 PROCEDURE — 4010F PR ACE/ARB THEARPY RXD/TAKEN: ICD-10-PCS | Mod: CPTII,S$GLB,, | Performed by: INTERNAL MEDICINE

## 2023-10-04 PROCEDURE — 3075F SYST BP GE 130 - 139MM HG: CPT | Mod: CPTII,S$GLB,, | Performed by: INTERNAL MEDICINE

## 2023-10-04 PROCEDURE — 3079F PR MOST RECENT DIASTOLIC BLOOD PRESSURE 80-89 MM HG: ICD-10-PCS | Mod: CPTII,S$GLB,, | Performed by: INTERNAL MEDICINE

## 2023-10-04 PROCEDURE — 1159F MED LIST DOCD IN RCRD: CPT | Mod: CPTII,S$GLB,, | Performed by: INTERNAL MEDICINE

## 2023-10-04 PROCEDURE — 3075F PR MOST RECENT SYSTOLIC BLOOD PRESS GE 130-139MM HG: ICD-10-PCS | Mod: CPTII,S$GLB,, | Performed by: INTERNAL MEDICINE

## 2023-10-04 PROCEDURE — 4010F ACE/ARB THERAPY RXD/TAKEN: CPT | Mod: CPTII,S$GLB,, | Performed by: INTERNAL MEDICINE

## 2023-10-04 RX ORDER — LORAZEPAM 0.5 MG/1
0.5 TABLET ORAL DAILY PRN
Qty: 90 TABLET | Refills: 0 | Status: SHIPPED | OUTPATIENT
Start: 2023-10-04

## 2023-10-04 RX ORDER — LACTULOSE 10 G/15ML
10 SOLUTION ORAL; RECTAL 2 TIMES DAILY
Qty: 946 ML | Refills: 3 | Status: SHIPPED | OUTPATIENT
Start: 2023-10-04

## 2023-10-09 DIAGNOSIS — I10 ESSENTIAL HYPERTENSION: ICD-10-CM

## 2023-10-10 RX ORDER — HYDROCHLOROTHIAZIDE 12.5 MG/1
12.5 TABLET ORAL 2 TIMES DAILY
Qty: 180 TABLET | Refills: 0 | Status: SHIPPED | OUTPATIENT
Start: 2023-10-10 | End: 2023-10-13

## 2023-10-12 DIAGNOSIS — I10 UNCONTROLLED HYPERTENSION: ICD-10-CM

## 2023-10-12 DIAGNOSIS — I10 ESSENTIAL HYPERTENSION: ICD-10-CM

## 2023-10-13 RX ORDER — VALSARTAN 160 MG/1
160 TABLET ORAL 2 TIMES DAILY
Qty: 180 TABLET | Refills: 3 | Status: SHIPPED | OUTPATIENT
Start: 2023-10-13

## 2023-10-13 RX ORDER — DILTIAZEM HYDROCHLORIDE 240 MG/1
240 CAPSULE, COATED, EXTENDED RELEASE ORAL
Qty: 90 CAPSULE | Refills: 3 | Status: SHIPPED | OUTPATIENT
Start: 2023-10-13

## 2023-10-13 RX ORDER — HYDROCHLOROTHIAZIDE 12.5 MG/1
12.5 TABLET ORAL 2 TIMES DAILY
Qty: 180 TABLET | Refills: 3 | Status: SHIPPED | OUTPATIENT
Start: 2023-10-13

## 2024-03-28 ENCOUNTER — PATIENT MESSAGE (OUTPATIENT)
Dept: FAMILY MEDICINE | Facility: CLINIC | Age: 54
End: 2024-03-28
Payer: COMMERCIAL

## 2024-04-03 LAB
ALBUMIN SERPL-MCNC: 4.1 G/DL (ref 3.6–5.1)
ALBUMIN/GLOB SERPL: 1.2 (CALC) (ref 1–2.5)
ALP SERPL-CCNC: 70 U/L (ref 37–153)
ALT SERPL-CCNC: 12 U/L (ref 6–29)
AST SERPL-CCNC: 11 U/L (ref 10–35)
BASOPHILS # BLD AUTO: 30 CELLS/UL (ref 0–200)
BASOPHILS NFR BLD AUTO: 0.4 %
BILIRUB SERPL-MCNC: 0.5 MG/DL (ref 0.2–1.2)
BUN SERPL-MCNC: 15 MG/DL (ref 7–25)
BUN/CREAT SERPL: NORMAL (CALC) (ref 6–22)
CALCIUM SERPL-MCNC: 9.9 MG/DL (ref 8.6–10.4)
CHLORIDE SERPL-SCNC: 100 MMOL/L (ref 98–110)
CHOLEST SERPL-MCNC: 260 MG/DL
CHOLEST/HDLC SERPL: 3.3 (CALC)
CO2 SERPL-SCNC: 29 MMOL/L (ref 20–32)
CREAT SERPL-MCNC: 0.7 MG/DL (ref 0.5–1.03)
EGFR: 103 ML/MIN/1.73M2
EOSINOPHIL # BLD AUTO: 228 CELLS/UL (ref 15–500)
EOSINOPHIL NFR BLD AUTO: 3 %
ERYTHROCYTE [DISTWIDTH] IN BLOOD BY AUTOMATED COUNT: 13.8 % (ref 11–15)
GLOBULIN SER CALC-MCNC: 3.4 G/DL (CALC) (ref 1.9–3.7)
GLUCOSE SERPL-MCNC: 87 MG/DL (ref 65–99)
HCT VFR BLD AUTO: 43.3 % (ref 35–45)
HDLC SERPL-MCNC: 79 MG/DL
HGB BLD-MCNC: 14 G/DL (ref 11.7–15.5)
LDLC SERPL CALC-MCNC: 163 MG/DL (CALC)
LYMPHOCYTES # BLD AUTO: 2067.2 CELLS/UL (ref 850–3900)
LYMPHOCYTES NFR BLD AUTO: 27.2 %
MCH RBC QN AUTO: 28.5 PG (ref 27–33)
MCHC RBC AUTO-ENTMCNC: 32.3 G/DL (ref 32–36)
MCV RBC AUTO: 88 FL (ref 80–100)
MONOCYTES # BLD AUTO: 669 CELLS/UL (ref 200–950)
MONOCYTES NFR BLD AUTO: 8.8 %
NEUTROPHILS # BLD AUTO: 4606 CELLS/UL (ref 1500–7800)
NEUTROPHILS NFR BLD AUTO: 60.6 %
NONHDLC SERPL-MCNC: 181 MG/DL (CALC)
PLATELET # BLD AUTO: 505 THOUSAND/UL (ref 140–400)
PMV BLD REES-ECKER: 11.5 FL (ref 7.5–12.5)
POTASSIUM SERPL-SCNC: 4.6 MMOL/L (ref 3.5–5.3)
PROT SERPL-MCNC: 7.5 G/DL (ref 6.1–8.1)
RBC # BLD AUTO: 4.92 MILLION/UL (ref 3.8–5.1)
SODIUM SERPL-SCNC: 138 MMOL/L (ref 135–146)
TRIGL SERPL-MCNC: 80 MG/DL
WBC # BLD AUTO: 7.6 THOUSAND/UL (ref 3.8–10.8)

## 2024-04-23 LAB — BCS RECOMMENDATION EXT: NORMAL

## 2024-04-29 ENCOUNTER — OFFICE VISIT (OUTPATIENT)
Dept: FAMILY MEDICINE | Facility: CLINIC | Age: 54
End: 2024-04-29
Payer: COMMERCIAL

## 2024-04-29 ENCOUNTER — PATIENT OUTREACH (OUTPATIENT)
Dept: ADMINISTRATIVE | Facility: HOSPITAL | Age: 54
End: 2024-04-29
Payer: COMMERCIAL

## 2024-04-29 VITALS
WEIGHT: 200.63 LBS | OXYGEN SATURATION: 97 % | HEIGHT: 64 IN | DIASTOLIC BLOOD PRESSURE: 74 MMHG | BODY MASS INDEX: 34.25 KG/M2 | SYSTOLIC BLOOD PRESSURE: 112 MMHG | RESPIRATION RATE: 16 BRPM | TEMPERATURE: 98 F | HEART RATE: 70 BPM

## 2024-04-29 DIAGNOSIS — Z00.00 ENCOUNTER FOR ANNUAL GENERAL MEDICAL EXAMINATION WITHOUT ABNORMAL FINDINGS IN ADULT: Primary | ICD-10-CM

## 2024-04-29 DIAGNOSIS — Z76.89 ENCOUNTER TO ESTABLISH CARE: ICD-10-CM

## 2024-04-29 DIAGNOSIS — D75.839 THROMBOCYTOSIS: ICD-10-CM

## 2024-04-29 DIAGNOSIS — E78.00 PURE HYPERCHOLESTEROLEMIA: ICD-10-CM

## 2024-04-29 DIAGNOSIS — I10 ESSENTIAL HYPERTENSION: Chronic | ICD-10-CM

## 2024-04-29 DIAGNOSIS — F41.9 ANXIETY: ICD-10-CM

## 2024-04-29 PROCEDURE — 3078F DIAST BP <80 MM HG: CPT | Mod: CPTII,S$GLB,, | Performed by: STUDENT IN AN ORGANIZED HEALTH CARE EDUCATION/TRAINING PROGRAM

## 2024-04-29 PROCEDURE — 99214 OFFICE O/P EST MOD 30 MIN: CPT | Mod: S$GLB,,, | Performed by: STUDENT IN AN ORGANIZED HEALTH CARE EDUCATION/TRAINING PROGRAM

## 2024-04-29 PROCEDURE — 1160F RVW MEDS BY RX/DR IN RCRD: CPT | Mod: CPTII,S$GLB,, | Performed by: STUDENT IN AN ORGANIZED HEALTH CARE EDUCATION/TRAINING PROGRAM

## 2024-04-29 PROCEDURE — 3074F SYST BP LT 130 MM HG: CPT | Mod: CPTII,S$GLB,, | Performed by: STUDENT IN AN ORGANIZED HEALTH CARE EDUCATION/TRAINING PROGRAM

## 2024-04-29 PROCEDURE — 3008F BODY MASS INDEX DOCD: CPT | Mod: CPTII,S$GLB,, | Performed by: STUDENT IN AN ORGANIZED HEALTH CARE EDUCATION/TRAINING PROGRAM

## 2024-04-29 PROCEDURE — 1159F MED LIST DOCD IN RCRD: CPT | Mod: CPTII,S$GLB,, | Performed by: STUDENT IN AN ORGANIZED HEALTH CARE EDUCATION/TRAINING PROGRAM

## 2024-04-29 PROCEDURE — 99999 PR PBB SHADOW E&M-EST. PATIENT-LVL V: CPT | Mod: PBBFAC,,, | Performed by: STUDENT IN AN ORGANIZED HEALTH CARE EDUCATION/TRAINING PROGRAM

## 2024-04-29 PROCEDURE — 4010F ACE/ARB THERAPY RXD/TAKEN: CPT | Mod: CPTII,S$GLB,, | Performed by: STUDENT IN AN ORGANIZED HEALTH CARE EDUCATION/TRAINING PROGRAM

## 2024-04-29 RX ORDER — LORAZEPAM 0.5 MG/1
0.5 TABLET ORAL DAILY PRN
Qty: 90 TABLET | Refills: 0 | Status: SHIPPED | OUTPATIENT
Start: 2024-04-29

## 2024-04-29 RX ORDER — ATORVASTATIN CALCIUM 40 MG/1
40 TABLET, FILM COATED ORAL NIGHTLY
Qty: 90 TABLET | Refills: 3 | Status: SHIPPED | OUTPATIENT
Start: 2024-04-29 | End: 2025-04-29

## 2024-04-29 NOTE — PROGRESS NOTES
Population Health Chart Review & Patient Outreach Details      Additional [facility] Health Notes:               Updates Requested / Reviewed:      Updated Care Coordination Note, Care Everywhere, , External Sources: [facility] and [facility], Care Team Updated, and Immunizations Reconciliation Completed or Queried: [place]         Health Maintenance Topics Overdue:      VB Score: 0     Patient is not due for any topics at this time.                       Health Maintenance Topic(s) Outreach Outcomes & Actions Taken:    Breast Cancer Screening - Outreach Outcomes & Actions Taken  : External Records Uploaded & Care Team Updated if Applicable    Cervical Cancer Screening - Outreach Outcomes & Actions Taken  : External Records Uploaded & Care Team Updated if Applicable  
4 = No assist / stand by assistance

## 2024-04-29 NOTE — PROGRESS NOTES
SUBJECTIVE:    CHIEF COMPLAINT:   Chief Complaint   Patient presents with    Establish Care    Medication Refill           274}    HISTORY OF PRESENT ILLNESS:  Jalyn Dickson is a 54 y.o. female anxiety, acid reflux, hypertension and hyperlipidemia who presents to the clinic to establish care.      She is doing well, requests refills on cholesterol medication. denies any chest pain, shortness of breath, abdominal pain, fevers, chills, nausea, vomiting, diarrhea or dysuria.     HM: complete         PAST MEDICAL HISTORY:     274}  Past Medical History:   Diagnosis Date    Acid reflux     Allergy     Anxiety     Hypertension     Neck mass     Pulmonary embolism     Sinus drainage        PAST SURGICAL HISTORY:  Past Surgical History:   Procedure Laterality Date    NECK LESION BIOPSY      neck mass removal      TUBAL LIGATION  1999       SOCIAL HISTORY:  Social History     Socioeconomic History    Marital status:    Tobacco Use    Smoking status: Never    Smokeless tobacco: Never   Substance and Sexual Activity    Alcohol use: Yes     Alcohol/week: 2.0 standard drinks of alcohol     Types: 2 Glasses of wine per week     Comment: occasional    Drug use: Yes     Social Determinants of Health     Financial Resource Strain: Low Risk  (4/23/2024)    Overall Financial Resource Strain (CARDIA)     Difficulty of Paying Living Expenses: Not hard at all   Food Insecurity: No Food Insecurity (4/23/2024)    Hunger Vital Sign     Worried About Running Out of Food in the Last Year: Never true     Ran Out of Food in the Last Year: Never true   Transportation Needs: No Transportation Needs (4/23/2024)    PRAPARE - Transportation     Lack of Transportation (Medical): No     Lack of Transportation (Non-Medical): No   Physical Activity: Insufficiently Active (4/23/2024)    Exercise Vital Sign     Days of Exercise per Week: 3 days     Minutes of Exercise per Session: 40 min   Stress: No Stress Concern Present (4/23/2024)     [facility] - Occupational Stress Questionnaire     Feeling of Stress : Only a little   Social Connections: Unknown (4/23/2024)    Social Connection and Isolation Panel [NHANES]     Frequency of Communication with Friends and Family: More than three times a week     Frequency of Social Gatherings with Friends and Family: Once a week     Active Member of Clubs or Organizations: Yes     Attends Club or Organization Meetings: More than 4 times per year     Marital Status:    Housing Stability: Unknown (4/23/2024)    Housing Stability Vital Sign     Unable to Pay for Housing in the Last Year: No       FAMILY HISTORY:       Family History   Problem Relation Name Age of Onset    Heart disease Mother      Coronary artery disease Father      Peripheral vascular disease Father      Diabetes Father      Hypertension Father      Hypertension Maternal Grandmother      No Known Problems Maternal Grandfather      PDD Paternal Grandfather      Hypertension Paternal Grandfather      No Known Problems Son         ALLERGIES AND MEDICATIONS: updated and reviewed.      274}  Review of patient's allergies indicates:  No Known Allergies  Medication List with Changes/Refills   Current Medications    ASCORBIC ACID, VITAMIN C, (VITAMIN C) 1000 MG TABLET    Take 1,000 mg by mouth once daily.    ASPIRIN 81 MG CHEW    Take 81 mg by mouth once daily.    B COMPLEX VITAMINS TABLET    Take 1 tablet by mouth once daily.    CYANOCOBALAMIN (VITAMIN B-12) 1000 MCG TABLET    Take 100 mcg by mouth once daily.    DILTIAZEM (CARDIZEM CD) 240 MG 24 HR CAPSULE    TAKE 1 CAPSULE BY MOUTH ONCE  DAILY    FLUTICASONE PROPIONATE (FLONASE) 50 MCG/ACTUATION NASAL SPRAY    1 spray (50 mcg total) by Each Nostril route once daily.    GENERLAC 10 GRAM/15 ML SOLUTION    Take 15 mLs (10 g total) by mouth 2 (two) times daily.    HYDROCHLOROTHIAZIDE (HYDRODIURIL) 12.5 MG TAB    TAKE 1 TABLET BY MOUTH TWICE  DAILY    LEVOCETIRIZINE  "(XYZAL) 5 MG TABLET    Take 5 mg by mouth every evening.    OMEPRAZOLE (PRILOSEC) 20 MG CAPSULE    Take 20 mg by mouth once daily.    VALSARTAN (DIOVAN) 160 MG TABLET    TAKE 1 TABLET BY MOUTH TWICE  DAILY    VITAMIN E 400 UNIT CAPSULE    Take 400 Units by mouth once daily.    ZINC SULFATE (ZINCATE) 50 MG ZINC (220 MG) CAPSULE    Take 1 capsule by mouth once daily.   Changed and/or Refilled Medications    Modified Medication Previous Medication    ATORVASTATIN (LIPITOR) 40 MG TABLET atorvastatin (LIPITOR) 40 MG tablet       Take 1 tablet (40 mg total) by mouth every evening. For Cholesterol    TAKE 1 TABLET BY MOUTH DAILY    LORAZEPAM (ATIVAN) 0.5 MG TABLET LORazepam (ATIVAN) 0.5 MG tablet       Take 1 tablet (0.5 mg total) by mouth daily as needed for Anxiety.    Take 1 tablet (0.5 mg total) by mouth daily as needed for Anxiety.       SCREENING HISTORY:    274}  Health Maintenance         Date Due Completion Date    Shingles Vaccine (1 of 2) 10/04/2024 (Originally 1/28/2020) ---    Mammogram 04/23/2025 4/23/2024    High Dose Statin 04/29/2025 4/29/2024    Cervical Cancer Screening 04/06/2026 4/6/2023    Hemoglobin A1c (Diabetic Prevention Screening) 09/22/2026 9/22/2023    TETANUS VACCINE 07/12/2027 7/12/2017 (Done)    Override on 7/12/2017: Done    Lipid Panel 04/02/2029 4/2/2024    Colorectal Cancer Screening 05/06/2031 5/6/2021            REVIEW OF SYSTEMS:   Review of Systems   All other systems reviewed and are negative.      PHYSICAL EXAM:      274}  /74 (BP Location: Right arm, Patient Position: Sitting, BP Method: Large (Manual))   Pulse 70   Temp 97.7 °F (36.5 °C) (Oral)   Resp 16   Ht 5' 4" (1.626 m)   Wt 91 kg (200 lb 9.9 oz)   LMP 08/25/2020   SpO2 97%   BMI 34.44 kg/m²   Wt Readings from Last 3 Encounters:   04/29/24 91 kg (200 lb 9.9 oz)   10/04/23 98.9 kg (218 lb)   04/04/23 109.3 kg (241 lb)     BP Readings from Last 3 Encounters:   04/29/24 112/74   10/04/23 130/80   04/04/23 108/68 " "    Estimated body mass index is 34.44 kg/m² as calculated from the following:    Height as of this encounter: 5' 4" (1.626 m).    Weight as of this encounter: 91 kg (200 lb 9.9 oz).     Physical Exam  Vitals reviewed.   Constitutional:       General: She is not in acute distress.     Appearance: Normal appearance. She is well-developed. She is not ill-appearing.   HENT:      Head: Normocephalic and atraumatic.      Right Ear: External ear normal.      Left Ear: External ear normal.      Nose: Nose normal.   Eyes:      Extraocular Movements: Extraocular movements intact.      Conjunctiva/sclera: Conjunctivae normal.      Pupils: Pupils are equal, round, and reactive to light.   Neck:      Thyroid: No thyroid mass.   Cardiovascular:      Rate and Rhythm: Normal rate and regular rhythm.      Pulses: Normal pulses.      Heart sounds: Normal heart sounds, S1 normal and S2 normal. No murmur heard.     No gallop.   Pulmonary:      Effort: Pulmonary effort is normal. No respiratory distress.      Breath sounds: Normal breath sounds and air entry. No stridor. No wheezing, rhonchi or rales.   Abdominal:      General: Bowel sounds are normal. There is no distension.      Palpations: Abdomen is soft. There is no mass.      Tenderness: There is no abdominal tenderness.   Musculoskeletal:         General: No swelling or deformity. Normal range of motion.      Cervical back: Normal range of motion and neck supple. No edema or erythema.   Skin:     General: Skin is warm and dry.      Findings: No lesion or rash.   Neurological:      General: No focal deficit present.      Mental Status: She is alert and oriented to person, place, and time. Mental status is at baseline.   Psychiatric:         Mood and Affect: Mood normal.         Behavior: Behavior normal. Behavior is cooperative.         Judgment: Judgment normal.         LABS:   274}  I have reviewed old labs below:  Lab Results   Component Value Date    WBC 7.6 04/02/2024    HGB " 14.0 04/02/2024    HCT 43.3 04/02/2024    MCV 88.0 04/02/2024     (H) 04/02/2024     04/02/2024    K 4.6 04/02/2024     04/02/2024    CALCIUM 9.9 04/02/2024    PHOS 4.6 (H) 11/09/2020    CO2 29 04/02/2024    GLU 87 04/02/2024    BUN 15 04/02/2024    CREATININE 0.70 04/02/2024    ANIONGAP 11 02/26/2021    ESTGFRAFRICA 123 03/29/2022    EGFRNONAA 106 03/29/2022    PROT 7.5 04/02/2024    ALBUMIN 4.1 04/02/2024    BILITOT 0.5 04/02/2024    ALKPHOS 60 02/26/2021    ALT 12 04/02/2024    AST 11 04/02/2024    INR 1.0 09/09/2017    CHOL 260 (H) 04/02/2024    TRIG 80 04/02/2024    HDL 79 04/02/2024    LDLCALC 163 (H) 04/02/2024    TSH 2.78 09/22/2023    HGBA1C 5.4 09/22/2023       ASSESSMENT AND PLAN:  274}  1. Encounter for annual general medical examination without abnormal findings in adult  Comments:  4/2/24 - Labs reviewed.    2. Encounter to establish care    3. Pure hypercholesterolemia  Comments:  Refill  Orders:  -     atorvastatin (LIPITOR) 40 MG tablet; Take 1 tablet (40 mg total) by mouth every evening. For Cholesterol  Dispense: 90 tablet; Refill: 3  -     Lipid Panel; Future; Expected date: 10/29/2024  -     Comprehensive Metabolic Panel; Future; Expected date: 10/29/2024    4. Anxiety  Comments:  Refill Lorazepam 0.5mg PO PRN.  Orders:  -     LORazepam (ATIVAN) 0.5 MG tablet; Take 1 tablet (0.5 mg total) by mouth daily as needed for Anxiety.  Dispense: 90 tablet; Refill: 0    5. Thrombocytosis  Comments:  4/2/24  Plts: 505, Hx of elevation. will continue to monitor. Pt takes aspirin daily.  Orders:  -     Ferritin; Future; Expected date: 10/29/2024  -     IRON AND TIBC; Future; Expected date: 10/29/2024    6. Essential hypertension  Comments:  c.w Valsartan 160mg PO daily.           Orders Placed This Encounter   Procedures    Lipid Panel    Comprehensive Metabolic Panel    Ferritin    IRON AND TIBC       Follow up in about 6 months (around 10/29/2024). or sooner as needed.

## 2024-10-23 ENCOUNTER — LAB VISIT (OUTPATIENT)
Dept: LAB | Facility: HOSPITAL | Age: 54
End: 2024-10-23
Attending: STUDENT IN AN ORGANIZED HEALTH CARE EDUCATION/TRAINING PROGRAM
Payer: COMMERCIAL

## 2024-10-23 DIAGNOSIS — E78.00 PURE HYPERCHOLESTEROLEMIA: ICD-10-CM

## 2024-10-23 DIAGNOSIS — D75.839 THROMBOCYTOSIS: ICD-10-CM

## 2024-10-23 LAB
ALBUMIN SERPL BCP-MCNC: 3.7 G/DL (ref 3.5–5.2)
ALP SERPL-CCNC: 77 U/L (ref 40–150)
ALT SERPL W/O P-5'-P-CCNC: 25 U/L (ref 10–44)
ANION GAP SERPL CALC-SCNC: 9 MMOL/L (ref 8–16)
AST SERPL-CCNC: 22 U/L (ref 10–40)
BILIRUB SERPL-MCNC: 0.6 MG/DL (ref 0.1–1)
BUN SERPL-MCNC: 11 MG/DL (ref 6–20)
CALCIUM SERPL-MCNC: 9.1 MG/DL (ref 8.7–10.5)
CHLORIDE SERPL-SCNC: 105 MMOL/L (ref 95–110)
CHOLEST SERPL-MCNC: 146 MG/DL (ref 120–199)
CHOLEST/HDLC SERPL: 2.1 {RATIO} (ref 2–5)
CO2 SERPL-SCNC: 25 MMOL/L (ref 23–29)
CREAT SERPL-MCNC: 0.7 MG/DL (ref 0.5–1.4)
EST. GFR  (NO RACE VARIABLE): >60 ML/MIN/1.73 M^2
FERRITIN SERPL-MCNC: 15 NG/ML (ref 20–300)
GLUCOSE SERPL-MCNC: 85 MG/DL (ref 70–110)
HDLC SERPL-MCNC: 71 MG/DL (ref 40–75)
HDLC SERPL: 48.6 % (ref 20–50)
IRON SERPL-MCNC: 38 UG/DL (ref 30–160)
LDLC SERPL CALC-MCNC: 61.8 MG/DL (ref 63–159)
NONHDLC SERPL-MCNC: 75 MG/DL
POTASSIUM SERPL-SCNC: 3.8 MMOL/L (ref 3.5–5.1)
PROT SERPL-MCNC: 7.3 G/DL (ref 6–8.4)
SATURATED IRON: 11 % (ref 20–50)
SODIUM SERPL-SCNC: 139 MMOL/L (ref 136–145)
TOTAL IRON BINDING CAPACITY: 358 UG/DL (ref 250–450)
TRANSFERRIN SERPL-MCNC: 242 MG/DL (ref 200–375)
TRIGL SERPL-MCNC: 66 MG/DL (ref 30–150)

## 2024-10-23 PROCEDURE — 80053 COMPREHEN METABOLIC PANEL: CPT | Performed by: STUDENT IN AN ORGANIZED HEALTH CARE EDUCATION/TRAINING PROGRAM

## 2024-10-23 PROCEDURE — 83540 ASSAY OF IRON: CPT | Performed by: STUDENT IN AN ORGANIZED HEALTH CARE EDUCATION/TRAINING PROGRAM

## 2024-10-23 PROCEDURE — 82728 ASSAY OF FERRITIN: CPT | Performed by: STUDENT IN AN ORGANIZED HEALTH CARE EDUCATION/TRAINING PROGRAM

## 2024-10-23 PROCEDURE — 80061 LIPID PANEL: CPT | Performed by: STUDENT IN AN ORGANIZED HEALTH CARE EDUCATION/TRAINING PROGRAM

## 2024-10-23 PROCEDURE — 36415 COLL VENOUS BLD VENIPUNCTURE: CPT | Mod: PO | Performed by: STUDENT IN AN ORGANIZED HEALTH CARE EDUCATION/TRAINING PROGRAM

## 2024-10-28 DIAGNOSIS — D50.9 IRON DEFICIENCY ANEMIA, UNSPECIFIED IRON DEFICIENCY ANEMIA TYPE: Primary | ICD-10-CM

## 2024-10-30 ENCOUNTER — OFFICE VISIT (OUTPATIENT)
Dept: FAMILY MEDICINE | Facility: CLINIC | Age: 54
End: 2024-10-30
Payer: COMMERCIAL

## 2024-10-30 VITALS
BODY MASS INDEX: 34.33 KG/M2 | HEART RATE: 65 BPM | DIASTOLIC BLOOD PRESSURE: 64 MMHG | RESPIRATION RATE: 16 BRPM | TEMPERATURE: 98 F | WEIGHT: 201.06 LBS | SYSTOLIC BLOOD PRESSURE: 102 MMHG | HEIGHT: 64 IN | OXYGEN SATURATION: 98 %

## 2024-10-30 DIAGNOSIS — R00.0 SINUS TACHYCARDIA: ICD-10-CM

## 2024-10-30 DIAGNOSIS — E78.00 PURE HYPERCHOLESTEROLEMIA: ICD-10-CM

## 2024-10-30 DIAGNOSIS — Z00.00 ENCOUNTER FOR ANNUAL GENERAL MEDICAL EXAMINATION WITHOUT ABNORMAL FINDINGS IN ADULT: ICD-10-CM

## 2024-10-30 DIAGNOSIS — I10 PRIMARY HYPERTENSION: Primary | ICD-10-CM

## 2024-10-30 DIAGNOSIS — Z23 IMMUNIZATION DUE: ICD-10-CM

## 2024-10-30 PROCEDURE — 90471 IMMUNIZATION ADMIN: CPT | Mod: S$GLB,,, | Performed by: STUDENT IN AN ORGANIZED HEALTH CARE EDUCATION/TRAINING PROGRAM

## 2024-10-30 PROCEDURE — 4010F ACE/ARB THERAPY RXD/TAKEN: CPT | Mod: CPTII,S$GLB,, | Performed by: STUDENT IN AN ORGANIZED HEALTH CARE EDUCATION/TRAINING PROGRAM

## 2024-10-30 PROCEDURE — 1160F RVW MEDS BY RX/DR IN RCRD: CPT | Mod: CPTII,S$GLB,, | Performed by: STUDENT IN AN ORGANIZED HEALTH CARE EDUCATION/TRAINING PROGRAM

## 2024-10-30 PROCEDURE — 90656 IIV3 VACC NO PRSV 0.5 ML IM: CPT | Mod: S$GLB,,, | Performed by: STUDENT IN AN ORGANIZED HEALTH CARE EDUCATION/TRAINING PROGRAM

## 2024-10-30 PROCEDURE — 3008F BODY MASS INDEX DOCD: CPT | Mod: CPTII,S$GLB,, | Performed by: STUDENT IN AN ORGANIZED HEALTH CARE EDUCATION/TRAINING PROGRAM

## 2024-10-30 PROCEDURE — 3078F DIAST BP <80 MM HG: CPT | Mod: CPTII,S$GLB,, | Performed by: STUDENT IN AN ORGANIZED HEALTH CARE EDUCATION/TRAINING PROGRAM

## 2024-10-30 PROCEDURE — 99999 PR PBB SHADOW E&M-EST. PATIENT-LVL V: CPT | Mod: PBBFAC,,, | Performed by: STUDENT IN AN ORGANIZED HEALTH CARE EDUCATION/TRAINING PROGRAM

## 2024-10-30 PROCEDURE — 3074F SYST BP LT 130 MM HG: CPT | Mod: CPTII,S$GLB,, | Performed by: STUDENT IN AN ORGANIZED HEALTH CARE EDUCATION/TRAINING PROGRAM

## 2024-10-30 PROCEDURE — 99214 OFFICE O/P EST MOD 30 MIN: CPT | Mod: 25,S$GLB,, | Performed by: STUDENT IN AN ORGANIZED HEALTH CARE EDUCATION/TRAINING PROGRAM

## 2024-10-30 PROCEDURE — 1159F MED LIST DOCD IN RCRD: CPT | Mod: CPTII,S$GLB,, | Performed by: STUDENT IN AN ORGANIZED HEALTH CARE EDUCATION/TRAINING PROGRAM

## 2024-10-30 RX ORDER — ATORVASTATIN CALCIUM 40 MG/1
40 TABLET, FILM COATED ORAL NIGHTLY
Qty: 90 TABLET | Refills: 3 | Status: SHIPPED | OUTPATIENT
Start: 2024-10-30 | End: 2025-10-30

## 2024-10-30 RX ORDER — DILTIAZEM HYDROCHLORIDE 240 MG/1
240 CAPSULE, COATED, EXTENDED RELEASE ORAL DAILY
Qty: 90 CAPSULE | Refills: 3 | Status: SHIPPED | OUTPATIENT
Start: 2024-10-30 | End: 2025-10-30

## 2025-01-08 ENCOUNTER — OFFICE VISIT (OUTPATIENT)
Dept: FAMILY MEDICINE | Facility: CLINIC | Age: 55
End: 2025-01-08
Payer: COMMERCIAL

## 2025-01-08 ENCOUNTER — HOSPITAL ENCOUNTER (OUTPATIENT)
Dept: RADIOLOGY | Facility: CLINIC | Age: 55
Discharge: HOME | End: 2025-01-08
Payer: COMMERCIAL

## 2025-01-08 VITALS
DIASTOLIC BLOOD PRESSURE: 64 MMHG | TEMPERATURE: 98 F | HEART RATE: 65 BPM | WEIGHT: 207 LBS | SYSTOLIC BLOOD PRESSURE: 120 MMHG | HEIGHT: 64 IN | OXYGEN SATURATION: 98 % | BODY MASS INDEX: 35.34 KG/M2

## 2025-01-08 DIAGNOSIS — G89.29 CHRONIC PAIN OF RIGHT KNEE: ICD-10-CM

## 2025-01-08 DIAGNOSIS — I10 ESSENTIAL HYPERTENSION: Chronic | ICD-10-CM

## 2025-01-08 DIAGNOSIS — M25.561 CHRONIC PAIN OF RIGHT KNEE: Primary | ICD-10-CM

## 2025-01-08 DIAGNOSIS — M25.561 CHRONIC PAIN OF RIGHT KNEE: ICD-10-CM

## 2025-01-08 DIAGNOSIS — G89.29 CHRONIC PAIN OF RIGHT KNEE: Primary | ICD-10-CM

## 2025-01-08 PROCEDURE — 1160F RVW MEDS BY RX/DR IN RCRD: CPT | Mod: CPTII,S$GLB,,

## 2025-01-08 PROCEDURE — 3008F BODY MASS INDEX DOCD: CPT | Mod: CPTII,S$GLB,,

## 2025-01-08 PROCEDURE — 3078F DIAST BP <80 MM HG: CPT | Mod: CPTII,S$GLB,,

## 2025-01-08 PROCEDURE — 99213 OFFICE O/P EST LOW 20 MIN: CPT | Mod: S$GLB,,,

## 2025-01-08 PROCEDURE — 1159F MED LIST DOCD IN RCRD: CPT | Mod: CPTII,S$GLB,,

## 2025-01-08 PROCEDURE — 3074F SYST BP LT 130 MM HG: CPT | Mod: CPTII,S$GLB,,

## 2025-01-08 PROCEDURE — 73562 X-RAY EXAM OF KNEE 3: CPT | Mod: TC,FY,PO,RT

## 2025-01-08 PROCEDURE — 99999 PR PBB SHADOW E&M-EST. PATIENT-LVL V: CPT | Mod: PBBFAC,,,

## 2025-01-08 PROCEDURE — 73562 X-RAY EXAM OF KNEE 3: CPT | Mod: 26,RT,, | Performed by: STUDENT IN AN ORGANIZED HEALTH CARE EDUCATION/TRAINING PROGRAM

## 2025-01-08 RX ORDER — NAPROXEN 500 MG/1
500 TABLET ORAL 2 TIMES DAILY WITH MEALS
Qty: 60 TABLET | Refills: 0 | Status: SHIPPED | OUTPATIENT
Start: 2025-01-08

## 2025-01-08 NOTE — PROGRESS NOTES
" [facility]     Subjective:       Patient ID:  [id number]     Chief Complaint: Right knee pain     Jalyn Dickson is a 54 y.o. female with a past medical history significant for HTN, HLD, PRABHU, history of pulmonary embolism, and GERD who presents to the clinic for right knee pain and swelling x 2 months.     The patient reports that in November, she visited the [facility] and had to climb four flights of steps. She recalls bending down and hearing a "pop" in her right knee. Since then, she has experienced persistent right knee pain and swelling. She denies any recent popping or instability but does report crepitus with movement. She has tried motrin and elevation of the knee without significant improvement. She denies any recent falls, warmth over the knee, fever, or chills. Pain is exacerbated with weight bearing, deep flexion, and extension.     Past Medical History:   Diagnosis Date    Acid reflux     Allergy     Anxiety     Hypertension     Neck mass     Pulmonary embolism     Sinus drainage       Review of patient's allergies indicates:  No Known Allergies    Lab Results   Component Value Date    WBC 7.6 04/02/2024    HGB 14.0 04/02/2024    HCT 43.3 04/02/2024     (H) 04/02/2024    CHOL 146 10/23/2024    TRIG 66 10/23/2024    HDL 71 10/23/2024    ALT 25 10/23/2024    AST 22 10/23/2024     10/23/2024    K 3.8 10/23/2024     10/23/2024    CREATININE 0.7 10/23/2024    BUN 11 10/23/2024    CO2 25 10/23/2024    TSH 2.78 09/22/2023    INR 1.0 09/09/2017    HGBA1C 5.4 09/22/2023       Review of Systems   Constitutional:  Positive for activity change. Negative for unexpected weight change.   HENT:  Negative for hearing loss, rhinorrhea and trouble swallowing.    Eyes:  Negative for discharge and visual disturbance.   Respiratory:  Negative for chest tightness and wheezing.    Cardiovascular:  Negative for chest pain and palpitations.   Gastrointestinal:  Negative for blood in " stool, constipation, diarrhea and vomiting.   Endocrine: Negative for polydipsia and polyuria.   Genitourinary:  Negative for difficulty urinating, dysuria, hematuria and menstrual problem.   Musculoskeletal:  Positive for arthralgias and joint swelling. Negative for neck pain.   Neurological:  Negative for weakness and headaches.   Psychiatric/Behavioral:  Negative for confusion and dysphoric mood.          Objective:      Physical Exam  Constitutional:       General: She is not in acute distress.     Appearance: Normal appearance. She is not toxic-appearing.   HENT:      Head: Normocephalic and atraumatic.      Nose: Nose normal.   Cardiovascular:      Rate and Rhythm: Normal rate and regular rhythm.      Pulses: Normal pulses.      Heart sounds: No murmur heard.     No friction rub.   Pulmonary:      Effort: Pulmonary effort is normal. No respiratory distress.      Breath sounds: Normal breath sounds. No wheezing.   Musculoskeletal:      Cervical back: Normal range of motion.      Right knee: Crepitus present. No erythema. Normal range of motion. No LCL laxity, MCL laxity, ACL laxity or PCL laxity.      Instability Tests: Anterior drawer test negative. Posterior drawer test negative. Anterior Lachman test negative. Medial Isabell test negative and lateral Isabell test negative.      Left knee: Normal range of motion.      Right lower leg: No edema.      Left lower leg: No edema.      Comments: Tenderness at the lateral aspect of right knee with palpation   ROM 0-110 - pain with extension and deep flexion at the anterior aspect of the knee   No instability appreciated    Skin:     General: Skin is warm and dry.   Neurological:      General: No focal deficit present.      Mental Status: She is alert and oriented to person, place, and time.   Psychiatric:         Mood and Affect: Mood normal.           Assessment:       1. Chronic pain of right knee    2. Essential hypertension          Plan:       Diagnoses and  all orders for this visit:    - Assessed right knee pain, considered joint effusion, septic joint, fracture, arthritis, or ligament abnormality. No alarming signs on exam, unlikely septic joint or fracture.    - Will obtain XR and refer to orthopedics. Patient may need MRI in the future if pain is persistent.   - Prescribed naproxen for inflammation   - Discussed continue elevation and icing knee when able.     Chronic pain of right knee  -     X-Ray Knee 3 View Right; Future  -     Ambulatory referral/consult to Orthopedics; Future  -     naproxen (NAPROSYN) 500 MG tablet; Take 1 tablet (500 mg total) by mouth 2 (two) times daily with meals.    Essential hypertension        -   Stable. Continue medication as prescribed.         Follow up for scheduled appointment.    Gabby Padilla PA-C  Family Medicine Physician Assistant     Future Appointments       Date Provider Specialty Appt Notes    1/8/2025  Radiology xray    1/13/2025 James Narayanan MD Orthopedics .    4/21/2025  Lab .    4/28/2025 Evelina Arredondo, DO Family Medicine 6 mo f/u             All of your core healthy metrics are met.       I spent a total of 20 minutes on the day of the visit.This includes face to face time and non-face to face time preparing to see the patient (eg, review of tests), obtaining and/or reviewing separately obtained history, documenting clinical information in the electronic or other health record, independently interpreting results and communicating results to the patient/family/caregiver, or care coordinator.

## 2025-01-09 ENCOUNTER — TELEPHONE (OUTPATIENT)
Dept: FAMILY MEDICINE | Facility: CLINIC | Age: 55
End: 2025-01-09
Payer: COMMERCIAL

## 2025-01-09 NOTE — TELEPHONE ENCOUNTER
----- Message from Gabby Padilla PA-C sent at 1/8/2025  5:24 PM CST -----  Hi Ms. Dickson,     I reviewed your XR which shows some narrowing of the joint space. There is also an effusion of the right knee. I would continue treatment as discussed in clinic and follow up with orthopedics as scheduled.     Gabby Padilla PA-C

## 2025-02-08 DIAGNOSIS — M25.561 CHRONIC PAIN OF RIGHT KNEE: ICD-10-CM

## 2025-02-08 DIAGNOSIS — G89.29 CHRONIC PAIN OF RIGHT KNEE: ICD-10-CM

## 2025-02-09 RX ORDER — NAPROXEN 500 MG/1
500 TABLET ORAL 2 TIMES DAILY WITH MEALS
Qty: 60 TABLET | Refills: 0 | Status: SHIPPED | OUTPATIENT
Start: 2025-02-09

## 2025-02-11 ENCOUNTER — E-VISIT (OUTPATIENT)
Dept: FAMILY MEDICINE | Facility: CLINIC | Age: 55
End: 2025-02-11
Payer: COMMERCIAL

## 2025-02-11 DIAGNOSIS — I10 ESSENTIAL HYPERTENSION: Primary | Chronic | ICD-10-CM

## 2025-02-11 RX ORDER — VALSARTAN AND HYDROCHLOROTHIAZIDE 160; 12.5 MG/1; MG/1
1 TABLET, FILM COATED ORAL DAILY
Qty: 90 TABLET | Refills: 3 | Status: SHIPPED | OUTPATIENT
Start: 2025-02-11 | End: 2026-02-11

## 2025-02-11 NOTE — PROGRESS NOTES
Deanii Gunderson,     Thank you for reaching out to me for an E-Visit so we can address your concern regarding: General Illness (Entered automatically based on patient selection in [employer] [place].)     I have reviewed your chart and read the answers to the questionnaire that you completed.  Based on the information provided, my diagnosis and recommendations are as follows:    Visit Diagnosis    1. Essential hypertension    Essential hypertension  Comments:  Refilling valsartani in combination with HCTZ.  Orders:  -     valsartan-hydrochlorothiazide (DIOVAN-HCT) 160-12.5 mg per tablet; Take 1 tablet by mouth once daily.  Dispense: 90 tablet; Refill: 3            Visit Orders  No orders of the defined types were placed in this encounter.       Please let me know if there is something else that you need.  If you send additional messages concerning this illness, please use this message thread to communicate.      Evelina Arredondo, DO

## 2025-03-28 DIAGNOSIS — F41.9 ANXIETY: ICD-10-CM

## 2025-03-29 NOTE — TELEPHONE ENCOUNTER
No care due was identified.  [facility] Embedded Care Due Messages. Reference number: [id number].   3/28/2025 7:03:55 PM CDT

## 2025-03-31 RX ORDER — LORAZEPAM 0.5 MG/1
0.5 TABLET ORAL DAILY PRN
Qty: 90 TABLET | Refills: 0 | Status: SHIPPED | OUTPATIENT
Start: 2025-03-31

## 2025-04-21 ENCOUNTER — LAB VISIT (OUTPATIENT)
Dept: LAB | Facility: HOSPITAL | Age: 55
End: 2025-04-21
Attending: STUDENT IN AN ORGANIZED HEALTH CARE EDUCATION/TRAINING PROGRAM
Payer: COMMERCIAL

## 2025-04-21 DIAGNOSIS — Z00.00 ENCOUNTER FOR ANNUAL GENERAL MEDICAL EXAMINATION WITHOUT ABNORMAL FINDINGS IN ADULT: ICD-10-CM

## 2025-04-21 LAB
ALBUMIN SERPL BCP-MCNC: 3.7 G/DL (ref 3.5–5.2)
ALP SERPL-CCNC: 81 UNIT/L (ref 40–150)
ALT SERPL W/O P-5'-P-CCNC: 27 UNIT/L (ref 10–44)
ANION GAP (OHS): 9 MMOL/L (ref 8–16)
AST SERPL-CCNC: 24 UNIT/L (ref 11–45)
BILIRUB SERPL-MCNC: 0.9 MG/DL (ref 0.1–1)
BUN SERPL-MCNC: 15 MG/DL (ref 6–20)
CALCIUM SERPL-MCNC: 9 MG/DL (ref 8.7–10.5)
CHLORIDE SERPL-SCNC: 103 MMOL/L (ref 95–110)
CHOLEST SERPL-MCNC: 159 MG/DL (ref 120–199)
CHOLEST/HDLC SERPL: 2 {RATIO} (ref 2–5)
CO2 SERPL-SCNC: 25 MMOL/L (ref 23–29)
CREAT SERPL-MCNC: 0.7 MG/DL (ref 0.5–1.4)
EAG (OHS): 100 MG/DL (ref 68–131)
ERYTHROCYTE [DISTWIDTH] IN BLOOD BY AUTOMATED COUNT: 14 % (ref 11.5–14.5)
FERRITIN SERPL-MCNC: 27 NG/ML (ref 20–300)
GFR SERPLBLD CREATININE-BSD FMLA CKD-EPI: >60 ML/MIN/1.73/M2
GLUCOSE SERPL-MCNC: 81 MG/DL (ref 70–110)
HBA1C MFR BLD: 5.1 % (ref 4–5.6)
HCT VFR BLD AUTO: 45.8 % (ref 37–48.5)
HDLC SERPL-MCNC: 79 MG/DL (ref 40–75)
HDLC SERPL: 49.7 % (ref 20–50)
HGB BLD-MCNC: 14.2 GM/DL (ref 12–16)
LDLC SERPL CALC-MCNC: 64.4 MG/DL (ref 63–159)
MCH RBC QN AUTO: 29.1 PG (ref 27–31)
MCHC RBC AUTO-ENTMCNC: 31 G/DL (ref 32–36)
MCV RBC AUTO: 94 FL (ref 82–98)
NONHDLC SERPL-MCNC: 80 MG/DL
PLATELET # BLD AUTO: 477 K/UL (ref 150–450)
PMV BLD AUTO: 11.1 FL (ref 9.2–12.9)
POTASSIUM SERPL-SCNC: 3.9 MMOL/L (ref 3.5–5.1)
PROT SERPL-MCNC: 7.5 GM/DL (ref 6–8.4)
RBC # BLD AUTO: 4.88 M/UL (ref 4–5.4)
SODIUM SERPL-SCNC: 137 MMOL/L (ref 136–145)
TRIGL SERPL-MCNC: 78 MG/DL (ref 30–150)
TSH SERPL-ACNC: 1.12 UIU/ML (ref 0.4–4)
WBC # BLD AUTO: 6.04 K/UL (ref 3.9–12.7)

## 2025-04-21 PROCEDURE — 83036 HEMOGLOBIN GLYCOSYLATED A1C: CPT

## 2025-04-21 PROCEDURE — 80061 LIPID PANEL: CPT

## 2025-04-21 PROCEDURE — 85027 COMPLETE CBC AUTOMATED: CPT

## 2025-04-21 PROCEDURE — 84443 ASSAY THYROID STIM HORMONE: CPT

## 2025-04-21 PROCEDURE — 82728 ASSAY OF FERRITIN: CPT

## 2025-04-21 PROCEDURE — 82040 ASSAY OF SERUM ALBUMIN: CPT

## 2025-04-21 PROCEDURE — 36415 COLL VENOUS BLD VENIPUNCTURE: CPT | Mod: PO

## 2025-04-23 ENCOUNTER — RESULTS FOLLOW-UP (OUTPATIENT)
Dept: FAMILY MEDICINE | Facility: CLINIC | Age: 55
End: 2025-04-23

## 2025-04-28 ENCOUNTER — OFFICE VISIT (OUTPATIENT)
Dept: FAMILY MEDICINE | Facility: CLINIC | Age: 55
End: 2025-04-28
Payer: COMMERCIAL

## 2025-04-28 VITALS
OXYGEN SATURATION: 99 % | SYSTOLIC BLOOD PRESSURE: 120 MMHG | DIASTOLIC BLOOD PRESSURE: 70 MMHG | HEART RATE: 68 BPM | WEIGHT: 202.19 LBS | BODY MASS INDEX: 34.52 KG/M2 | HEIGHT: 64 IN

## 2025-04-28 DIAGNOSIS — Z12.31 SCREENING MAMMOGRAM FOR BREAST CANCER: ICD-10-CM

## 2025-04-28 DIAGNOSIS — Z00.00 ENCOUNTER FOR ANNUAL GENERAL MEDICAL EXAMINATION WITHOUT ABNORMAL FINDINGS IN ADULT: Primary | ICD-10-CM

## 2025-04-28 DIAGNOSIS — I10 ESSENTIAL HYPERTENSION: Chronic | ICD-10-CM

## 2025-04-28 DIAGNOSIS — I26.99 PULMONARY EMBOLISM, BILATERAL: Chronic | ICD-10-CM

## 2025-04-28 PROCEDURE — 99396 PREV VISIT EST AGE 40-64: CPT | Mod: S$GLB,,, | Performed by: STUDENT IN AN ORGANIZED HEALTH CARE EDUCATION/TRAINING PROGRAM

## 2025-04-28 PROCEDURE — 1159F MED LIST DOCD IN RCRD: CPT | Mod: CPTII,S$GLB,, | Performed by: STUDENT IN AN ORGANIZED HEALTH CARE EDUCATION/TRAINING PROGRAM

## 2025-04-28 PROCEDURE — 3078F DIAST BP <80 MM HG: CPT | Mod: CPTII,S$GLB,, | Performed by: STUDENT IN AN ORGANIZED HEALTH CARE EDUCATION/TRAINING PROGRAM

## 2025-04-28 PROCEDURE — 99999 PR PBB SHADOW E&M-EST. PATIENT-LVL V: CPT | Mod: PBBFAC,,, | Performed by: STUDENT IN AN ORGANIZED HEALTH CARE EDUCATION/TRAINING PROGRAM

## 2025-04-28 PROCEDURE — 3044F HG A1C LEVEL LT 7.0%: CPT | Mod: CPTII,S$GLB,, | Performed by: STUDENT IN AN ORGANIZED HEALTH CARE EDUCATION/TRAINING PROGRAM

## 2025-04-28 PROCEDURE — 3008F BODY MASS INDEX DOCD: CPT | Mod: CPTII,S$GLB,, | Performed by: STUDENT IN AN ORGANIZED HEALTH CARE EDUCATION/TRAINING PROGRAM

## 2025-04-28 PROCEDURE — 3074F SYST BP LT 130 MM HG: CPT | Mod: CPTII,S$GLB,, | Performed by: STUDENT IN AN ORGANIZED HEALTH CARE EDUCATION/TRAINING PROGRAM

## 2025-04-28 RX ORDER — POTASSIUM CHLORIDE 750 MG/1
10 CAPSULE, EXTENDED RELEASE ORAL DAILY
Qty: 90 CAPSULE | Refills: 1 | Status: SHIPPED | OUTPATIENT
Start: 2025-04-28 | End: 2025-10-25

## 2025-04-30 DIAGNOSIS — Z12.31 OTHER SCREENING MAMMOGRAM: ICD-10-CM

## 2025-05-06 NOTE — PROGRESS NOTES
SUBJECTIVE:    CHIEF COMPLAINT:   Chief Complaint   Patient presents with    Follow-up           274}    HISTORY OF PRESENT ILLNESS:  History of Present Illness    CHIEF COMPLAINT:  Ms. Dickson presents for a follow-up visit to discuss blood pressure management and review recent lab results.    HPI:  Ms. Dickson is a 55F with PMHx below here for followup. She reports improved blood pressure control after discontinuing the evening dose of valsartan. She had dizziness and felt unsteady when taking the evening dose, but these symptoms have resolved since discontinuation. She confirms ending the thiazide as instructed and is now taking a combination of valsartan and hydrochlorothiazide, which she tolerates well. She also mentions occasional constipation in the past, for which she used lactulose, but confirms she has not been taking it recently. She denies current dizziness or unsteady feelings.      ROS:  Negative, except as noted above.           PAST MEDICAL HISTORY:     274}  Past Medical History:   Diagnosis Date    Acid reflux     Allergy     Anxiety     Hypertension     Neck mass     Pulmonary embolism     Sinus drainage        PAST SURGICAL HISTORY:  Past Surgical History:   Procedure Laterality Date    NECK LESION BIOPSY      neck mass removal      TUBAL LIGATION  1999       SOCIAL HISTORY:  Social History[1]    FAMILY HISTORY:       Family History   Problem Relation Name Age of Onset    Heart disease Mother Daja Rubio     Coronary artery disease Father Ugo Alex     Peripheral vascular disease Father Ugo Alex     Diabetes Father Ugo Alex     Hypertension Father Ugo Alex     Hypertension Maternal Grandmother Yokasta Corado     No Known Problems Maternal Grandfather      Hypertension Paternal Grandmother Lottie Alex     PDD Paternal Grandfather      Hypertension Paternal Grandfather      No Known Problems Son         ALLERGIES AND MEDICATIONS: updated and reviewed.      274}  Review of  patient's allergies indicates:  No Known Allergies  Medication List with Changes/Refills   New Medications    POTASSIUM CHLORIDE (MICRO-K) 10 MEQ CPSR    Take 1 capsule (10 mEq total) by mouth once daily. for 180 doses   Current Medications    ASCORBIC ACID, VITAMIN C, (VITAMIN C) 1000 MG TABLET    Take 1,000 mg by mouth once daily.    ASPIRIN 81 MG CHEW    Take 81 mg by mouth once daily.    ATORVASTATIN (LIPITOR) 40 MG TABLET    Take 1 tablet (40 mg total) by mouth every evening. For Cholesterol    B COMPLEX VITAMINS TABLET    Take 1 tablet by mouth once daily.    CYANOCOBALAMIN (VITAMIN B-12) 1000 MCG TABLET    Take 100 mcg by mouth once daily.    DILTIAZEM (CARDIZEM CD) 240 MG 24 HR CAPSULE    Take 1 capsule (240 mg total) by mouth once daily.    FLUTICASONE PROPIONATE (FLONASE) 50 MCG/ACTUATION NASAL SPRAY    1 spray (50 mcg total) by Each Nostril route once daily.    GENERLAC 10 GRAM/15 ML SOLUTION    Take 15 mLs (10 g total) by mouth 2 (two) times daily.    LEVOCETIRIZINE (XYZAL) 5 MG TABLET    Take 5 mg by mouth every evening.    LORAZEPAM (ATIVAN) 0.5 MG TABLET    TAKE 1 TABLET(0.5 MG) BY MOUTH DAILY AS NEEDED FOR ANXIETY    NAPROXEN (NAPROSYN) 500 MG TABLET    TAKE 1 TABLET(500 MG) BY MOUTH TWICE DAILY WITH MEALS    OMEPRAZOLE (PRILOSEC) 20 MG CAPSULE    Take 20 mg by mouth once daily.    VALSARTAN-HYDROCHLOROTHIAZIDE (DIOVAN-HCT) 160-12.5 MG PER TABLET    Take 1 tablet by mouth once daily.    VITAMIN E 400 UNIT CAPSULE    Take 400 Units by mouth once daily.    ZINC SULFATE (ZINCATE) 50 MG ZINC (220 MG) CAPSULE    Take 1 capsule by mouth once daily.       SCREENING HISTORY:    274}  Health Maintenance         Date Due Completion Date    Shingles Vaccine (1 of 2) Never done ---    Pneumococcal Vaccines (Age 50+) (1 of 1 - PCV) Never done ---    COVID-19 Vaccine (10 - 2024-25 season) 09/01/2024 11/4/2023    Mammogram 04/23/2025 4/23/2024    Cervical Cancer Screening 04/06/2026 4/6/2023    High Dose Statin  "04/28/2026 4/28/2025    TETANUS VACCINE 07/12/2027 7/12/2017 (Done)    Override on 7/12/2017: Done    Hemoglobin A1c (Diabetic Prevention Screening) 04/21/2028 4/21/2025    Lipid Panel 04/21/2030 4/21/2025    Colorectal Cancer Screening 05/06/2031 5/6/2021    RSV Vaccine (Age 60+ and Pregnant patients) (1 - 1-dose 75+ series) 01/28/2045 ---                  PHYSICAL EXAM:      274}  /70 (BP Location: Right arm, Patient Position: Sitting)   Pulse 68   Ht 5' 4" (1.626 m)   Wt 91.7 kg (202 lb 2.6 oz)   LMP 08/25/2020   SpO2 99%   BMI 34.70 kg/m²   Wt Readings from Last 3 Encounters:   04/28/25 91.7 kg (202 lb 2.6 oz)   01/08/25 93.9 kg (207 lb 0.2 oz)   10/30/24 91.2 kg (201 lb 1 oz)     BP Readings from Last 3 Encounters:   04/28/25 120/70   01/08/25 120/64   10/30/24 102/64     Estimated body mass index is 34.7 kg/m² as calculated from the following:    Height as of this encounter: 5' 4" (1.626 m).    Weight as of this encounter: 91.7 kg (202 lb 2.6 oz).     Physical Exam  Vitals reviewed.   Constitutional:       General: She is not in acute distress.     Appearance: Normal appearance. She is well-developed. She is obese. She is not ill-appearing.   HENT:      Head: Normocephalic and atraumatic.      Right Ear: External ear normal.      Left Ear: External ear normal.      Nose: Nose normal.   Eyes:      Extraocular Movements: Extraocular movements intact.      Conjunctiva/sclera: Conjunctivae normal.      Pupils: Pupils are equal, round, and reactive to light.   Neck:      Thyroid: No thyroid mass.   Cardiovascular:      Rate and Rhythm: Normal rate and regular rhythm.      Pulses: Normal pulses.      Heart sounds: Normal heart sounds, S1 normal and S2 normal. No murmur heard.     No gallop.   Pulmonary:      Effort: Pulmonary effort is normal. No respiratory distress.      Breath sounds: Normal breath sounds and air entry. No stridor. No wheezing, rhonchi or rales.   Abdominal:      General: There is no " distension.      Palpations: Abdomen is soft. There is no mass.      Tenderness: There is no abdominal tenderness.   Musculoskeletal:         General: No swelling or deformity. Normal range of motion.      Cervical back: Normal range of motion and neck supple. No edema or erythema.   Skin:     General: Skin is warm and dry.      Findings: No lesion or rash.   Neurological:      General: No focal deficit present.      Mental Status: She is alert and oriented to person, place, and time. Mental status is at baseline.   Psychiatric:         Mood and Affect: Mood normal.         Behavior: Behavior normal. Behavior is cooperative.         Judgment: Judgment normal.          LABS:   274}  I have reviewed old labs below:  Lab Results   Component Value Date    WBC 6.04 04/21/2025    HGB 14.2 04/21/2025    HCT 45.8 04/21/2025    MCV 94 04/21/2025     (H) 04/21/2025     04/21/2025    K 3.9 04/21/2025     04/21/2025    CALCIUM 9.0 04/21/2025    PHOS 4.6 (H) 11/09/2020    CO2 25 04/21/2025    GLU 81 04/21/2025    BUN 15 04/21/2025    CREATININE 0.7 04/21/2025    ANIONGAP 9 04/21/2025    ESTGFRAFRICA 123 03/29/2022    EGFRNONAA 106 03/29/2022    PROT 7.5 04/21/2025    ALBUMIN 3.7 04/21/2025    BILITOT 0.9 04/21/2025    ALKPHOS 81 04/21/2025    ALT 27 04/21/2025    AST 24 04/21/2025    INR 1.0 09/09/2017    CHOL 159 04/21/2025    TRIG 78 04/21/2025    HDL 79 (H) 04/21/2025    LDLCALC 64.4 04/21/2025    TSH 1.124 04/21/2025    HGBA1C 5.1 04/21/2025       ASSESSMENT AND PLAN:  274}  1. Encounter for annual general medical examination without abnormal findings in adult  -     TSH; Future; Expected date: 10/28/2025  -     Lipid Panel; Future; Expected date: 10/28/2025  -     Hemoglobin A1C; Future; Expected date: 10/28/2025  -     Comprehensive Metabolic Panel; Future; Expected date: 10/28/2025  -     Ferritin; Future; Expected date: 10/28/2025  -     CBC Auto Differential; Future; Expected date: 10/28/2025    2.  Essential hypertension  Comments:  BP well-controlled on diltiazem and valsartan-hydrochlorothiazide.  Continue present medications.  Orders:  -     potassium chloride (MICRO-K) 10 MEQ CpSR; Take 1 capsule (10 mEq total) by mouth once daily. for 180 doses  Dispense: 90 capsule; Refill: 1    3. History of Pulmonary embolism, bilateral  Comments:  c/w ASA as prescribed    4. Screening mammogram for breast cancer  -     Mammo Digital Screening Bilat w/ [facility] (XPD); Future; Expected date: 06/06/2025         Assessment & Plan    IMPRESSION:   BP control improved after discontinuation of evening valsartan dose.   Evaluated current medication regimen, including valsartan/HCTZ and diltiazem, for BP management and continued both medications for heart rate and BP control.   Ordered potassium supplementation due to potential hypokalemia risk from diuretic use.   Recent lab results showed improved platelet count and normal cholesterol, kidney, and liver function.   Advised against regular use of lactulose for constipation due to potential potassium loss.   Confirmed recent completion of shingles and pneumococcal vaccinations, though not reflected in current records.    HYPERTENSION MANAGEMENT:   Monitored blood pressure, which is currently well-controlled with valsartan and diltiazem.   Previous evening dose of valsartan was discontinued, which successfully resolved dizziness issues.   Recent labs show cholesterol levels, kidney function, and liver function within normal limits.   Platelets have decreased to a better range, slightly above normal.    ELECTROLYTE MANAGEMENT:   Prescribed potassium supplementation to prevent hypokalemia due to the hydrochlorothiazide component in the medication regimen.   Explained that leg cramps can be a symptom of low potassium.   Discussed natural sources of potassium (bananas, lentils, avocados) but acknowledged difficulty in maintaining adequate intake through diet alone.   Instructed patient  to obtain potassium levels from recent mammogram provider if available.    FOLLOW-UP AND MONITORING:   Ordered complete lab work before next appointment.   Scheduled follow up in 1 year.         Orders Placed This Encounter   Procedures    Mammo Digital Screening Bilat w/ [facility] (XPD)    TSH    Lipid Panel    Hemoglobin A1C    Comprehensive Metabolic Panel    Ferritin    CBC Auto Differential         This note was generated with the assistance of ambient listening technology. Verbal consent was obtained by the patient and accompanying visitor(s) for the recording of patient appointment to facilitate this note. I attest to having reviewed and edited the generated note for accuracy, though some syntax or spelling errors may persist. Please contact the author of this note for any clarification.               [1]   Social History  Socioeconomic History    Marital status:    Tobacco Use    Smoking status: Never     Passive exposure: Never    Smokeless tobacco: Never   Substance and Sexual Activity    Alcohol use: Yes     Alcohol/week: 2.0 standard drinks of alcohol     Types: 2 Glasses of wine per week     Comment: occasional    Drug use: Yes    Sexual activity: Yes     Partners: Male     Birth control/protection: None     Social Drivers of Health     Financial Resource Strain: Low Risk  (4/27/2025)    Overall Financial Resource Strain (CARDIA)     Difficulty of Paying Living Expenses: Not hard at all   Food Insecurity: No Food Insecurity (4/27/2025)    Hunger Vital Sign     Worried About Running Out of Food in the Last Year: Never true     Ran Out of Food in the Last Year: Never true   Transportation Needs: No Transportation Needs (4/27/2025)    PRAPARE - Transportation     Lack of Transportation (Medical): No     Lack of Transportation (Non-Medical): No   Physical Activity: Insufficiently Active (4/27/2025)    Exercise Vital Sign     Days of Exercise per Week: 2 days     Minutes of Exercise per Session: 30 min    Stress: No Stress Concern Present (4/27/2025)    [employer] - Occupational Stress Questionnaire     Feeling of Stress : Only a little   Housing Stability: Low Risk  (4/27/2025)    Housing Stability Vital Sign     Unable to Pay for Housing in the Last Year: No     Number of Times Moved in the Last Year: 0     Homeless in the Last Year: No